# Patient Record
Sex: FEMALE | Race: WHITE | NOT HISPANIC OR LATINO | Employment: FULL TIME | URBAN - METROPOLITAN AREA
[De-identification: names, ages, dates, MRNs, and addresses within clinical notes are randomized per-mention and may not be internally consistent; named-entity substitution may affect disease eponyms.]

---

## 2017-02-01 ENCOUNTER — OFFICE VISIT (OUTPATIENT)
Dept: PSYCHIATRY | Facility: CLINIC | Age: 22
End: 2017-02-01
Payer: COMMERCIAL

## 2017-02-01 ENCOUNTER — HOSPITAL ENCOUNTER (OUTPATIENT)
Dept: CARDIOLOGY | Facility: CLINIC | Age: 22
Discharge: HOME | End: 2017-02-01
Payer: COMMERCIAL

## 2017-02-01 ENCOUNTER — LAB VISIT (OUTPATIENT)
Dept: LAB | Facility: HOSPITAL | Age: 22
End: 2017-02-01
Attending: PSYCHIATRY & NEUROLOGY
Payer: COMMERCIAL

## 2017-02-01 VITALS
BODY MASS INDEX: 34.91 KG/M2 | WEIGHT: 197 LBS | HEART RATE: 89 BPM | DIASTOLIC BLOOD PRESSURE: 83 MMHG | SYSTOLIC BLOOD PRESSURE: 131 MMHG | HEIGHT: 63 IN

## 2017-02-01 DIAGNOSIS — F90.2 ADHD (ATTENTION DEFICIT HYPERACTIVITY DISORDER), COMBINED TYPE: ICD-10-CM

## 2017-02-01 DIAGNOSIS — F90.2 ADHD (ATTENTION DEFICIT HYPERACTIVITY DISORDER), COMBINED TYPE: Primary | ICD-10-CM

## 2017-02-01 LAB
ALBUMIN SERPL BCP-MCNC: 3.6 G/DL
ALP SERPL-CCNC: 56 U/L
ALT SERPL W/O P-5'-P-CCNC: 15 U/L
ANION GAP SERPL CALC-SCNC: 8 MMOL/L
AST SERPL-CCNC: 15 U/L
BILIRUB SERPL-MCNC: 0.3 MG/DL
BUN SERPL-MCNC: 17 MG/DL
CALCIUM SERPL-MCNC: 9 MG/DL
CHLORIDE SERPL-SCNC: 104 MMOL/L
CO2 SERPL-SCNC: 27 MMOL/L
CREAT SERPL-MCNC: 1 MG/DL
EST. GFR  (AFRICAN AMERICAN): >60 ML/MIN/1.73 M^2
EST. GFR  (NON AFRICAN AMERICAN): >60 ML/MIN/1.73 M^2
GLUCOSE SERPL-MCNC: 82 MG/DL
POTASSIUM SERPL-SCNC: 4 MMOL/L
PROT SERPL-MCNC: 7.3 G/DL
SODIUM SERPL-SCNC: 139 MMOL/L

## 2017-02-01 PROCEDURE — 99213 OFFICE O/P EST LOW 20 MIN: CPT | Mod: S$GLB,,, | Performed by: PSYCHIATRY & NEUROLOGY

## 2017-02-01 PROCEDURE — 93000 ELECTROCARDIOGRAM COMPLETE: CPT | Mod: S$GLB,,, | Performed by: INTERNAL MEDICINE

## 2017-02-01 PROCEDURE — 99999 PR PBB SHADOW E&M-EST. PATIENT-LVL II: CPT | Mod: PBBFAC,,, | Performed by: PSYCHIATRY & NEUROLOGY

## 2017-02-01 PROCEDURE — 80053 COMPREHEN METABOLIC PANEL: CPT

## 2017-02-01 PROCEDURE — 36415 COLL VENOUS BLD VENIPUNCTURE: CPT

## 2017-02-01 RX ORDER — DEXTROAMPHETAMINE SACCHARATE, AMPHETAMINE ASPARTATE, DEXTROAMPHETAMINE SULFATE AND AMPHETAMINE SULFATE 5; 5; 5; 5 MG/1; MG/1; MG/1; MG/1
TABLET ORAL
Qty: 60 TABLET | Refills: 0 | Status: SHIPPED | OUTPATIENT
Start: 2017-02-20 | End: 2017-03-19

## 2017-02-01 RX ORDER — DEXTROAMPHETAMINE SACCHARATE, AMPHETAMINE ASPARTATE, DEXTROAMPHETAMINE SULFATE AND AMPHETAMINE SULFATE 5; 5; 5; 5 MG/1; MG/1; MG/1; MG/1
1 TABLET ORAL DAILY
Qty: 60 TABLET | Refills: 0 | Status: SHIPPED | OUTPATIENT
Start: 2017-03-20 | End: 2017-02-01 | Stop reason: SDUPTHER

## 2017-02-01 RX ORDER — DEXTROAMPHETAMINE SACCHARATE, AMPHETAMINE ASPARTATE, DEXTROAMPHETAMINE SULFATE AND AMPHETAMINE SULFATE 5; 5; 5; 5 MG/1; MG/1; MG/1; MG/1
TABLET ORAL
Qty: 60 TABLET | Refills: 0 | Status: SHIPPED | OUTPATIENT
Start: 2017-03-20 | End: 2017-04-12 | Stop reason: SDUPTHER

## 2017-02-01 NOTE — PROGRESS NOTES
"2/1/2017    Bernarda Ly   1995   [id number]       OUTPATIENT PSYCHIATRY FOLLOW UP NOTE      Clinical Status of Patient:  Outpatient (Ambulatory)    Chief Complaint:  Bernarda Ly is a 21 y.o. female who presents today for follow-up of attention problems.  Met with patient and I have reviewed the patient's medical history in detail and updated the computerized patient record.    Interval History and Content of Current Session:  Pt presents for f/u appointment today for ADHD. Pt 15 min late to appointment.  Pt is doing well.  Pt using the adderall to study for school.  She has only needed it to study and going to nursing classes.  Pt was able to get and A and 2 B's, which she is happy with.  Pt states she has been taking Adderall IR 20mg in the morning and either another 10-20mg in the afternoon depending on how much she needs to study.  Pt states she has a "drug holiday" 2 days a week.  She mentions that the she will have drowsiness when the medication wears off. She said her appetite is good and she is sleeping well at night. She denies any other past psychiatric problems. She denies AVH/SI/HI, plan or intent.       SUBJECTIVE     Psychiatric Review Of Systems - Is patient experiencing or having changes in:  sleep: no  appetite: no  weight: no  energy/anergy: no  interest/pleasure/anhedonia: no  somatic symptoms: no  libido: no  guilty/hopelessness: no  concentration: no  S.I.B.s/risky behavior: no  SI/SA:  no    anxiety/panic: no  Agoraphobia:  no  Social phobia:  no  Recurrent nightmares:  no  hyper startle response:  no  Avoidance: no  Recurrent thoughts:  no  Recurrent behaviors:  no    Irritability: no  Racing thoughts: no  Impulsive behaviors: no  Pressured speech:  no    Paranoia:no  Delusions: no  AVH:no    Screens and Inventories:  none    Substance abuse:  ETOH- denies  Drugs- denies  Tobacco= denies    Past Medical, Family and Social History: The patient's past medical, family and social history have " "been reviewed and updated as appropriate within the electronic medical record - see encounter notes.  Pt states she does not have any other medical issues. Pt denies ETOH, illicit drug use and tobacco use. Pt is  and will want to have children but not anytime soon. 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    Current Psychotropic medications:  Adderall IR 20mg PO qAM and 10-20mg in the afternoon    Compliance: no    Side effects: see above    Risk Parameters:  Patient reports no suicidal ideation  Patient reports no homicidal ideation  Patient reports no self-injurious behavior  Patient reports no violent behavior    Psychotherapy:  Supportive psychotherapy for 5 min    OBJECTIVE     Medical ROS  General ROS: negative for - chills, fatigue or fever  Respiratory ROS: no cough, shortness of breath, or wheezing  Cardiovascular ROS: no chest pain or dyspnea on exertion  Gastrointestinal ROS: no abdominal pain, change in bowel habits, or black or bloody stools  Musculoskeletal ROS: negative for - gait disturbance, joint stiffness, muscle pain or muscular weakness  Neurological ROS: negative for - confusion, dizziness, gait disturbance, headaches, impaired coordination/balance, seizures or visual changes    Nutritional Screening: Considering the patient's height and weight, medications, medical history and preferences, should a referral be made to the dietitian? no    Musculoskeletal  Muscle Strength/Tone:  not examined   Gait & Station:  non-ataxic     Relevant Elements of Neurological Exam: normal gait  AIMS:  n/a    Constitutional  Vitals:  Most recent vital signs, dated less than 90 days prior to this appointment, were reviewed.    Vitals:    02/01/17 1515   BP: 131/83   Pulse: 89   Weight: 89.4 kg (197 lb)   Height: 5' 3" (1.6 m)          Allergies  Review of patient's allergies indicates:   Allergen Reactions    Sulfa (sulfonamide antibiotics)        Laboratory Data  No visits with results within 1 " "Month(s) from this visit.  Latest known visit with results is:    Orders Only on 11/29/2010   Component Date Value Ref Range Status    Strep A Culture 11/29/2010 NEGATIVE FOR GROUP A STREP   Final    Monospot 12/02/2010 Negative  Negative Final    WBC 12/02/2010 6.64  4.5 - 13.0 K/uL Final    RBC 12/02/2010 4.49  4.1 - 5.1 M/uL Final    Hemoglobin 12/02/2010 13.4  12.0 - 16.0 gm/dl Final    Hematocrit 12/02/2010 38.6  36.0 - 46.0 % Final    MCV 12/02/2010 86.0  78 - 104 fL Final    MCH 12/02/2010 29.8  25 - 35 pg Final    MCHC 12/02/2010 34.7  31 - 36 % Final    RDW 12/02/2010 11.8  11.5 - 14.5 % Final    Platelets 12/02/2010 276  150 - 350 K/uL Final    MPV 12/02/2010 10.6  9.2 - 12.9 fL Final    Segs 12/02/2010 55  50 - 70 % Final    Lymphs 12/02/2010 32  25 - 40 % Final    Monocytes 12/02/2010 8  0 - 10 % Final    Eosinophils 12/02/2010 5  0 - 8 % Final    Platelet Estimate 12/02/2010 Adequate  Normal Final       All Medications    Current Outpatient Prescriptions:     dextroamphetamine-amphetamine (AMPHETAMINE SALT COMBO) 10 mg Tab, Take 20 mg by mouth 2 (two) times daily., Disp: 60 tablet, Rfl: 0    norethindrone-ethinyl estradiol (JUNEL FE 1/20) 1 mg-20 mcg (21)/75 mg (7) per tablet, Take 1 tablet by mouth once daily., Disp: 84 tablet, Rfl: 3    Psychiatric Mental Status Exam  Appearance: unremarkable, age appropriate, well groomed   Behavior/Cooperation: appopriate friendly and cooperative  Speech: appropriate rate, volume and tone  Mood: "doing well"  Affect:  full; reactive, bright  Thought Process: normal and logical  Thought Content: normal, no suicidality, no homicidality, delusions, or paranoia  Sensorium:    grossly intact  Alert and Oriented: x3  Memory: grossly intact    Attention/concentration: appropriate for age/education.  Similarities:  grossly intact  Abstract reasoning: grossly intact  Insight: Intact  Judgment: Intact    ASSESSMENT      Impression:      ICD-10-CM ICD-9-CM "   1. ADHD (attention deficit hyperactivity disorder), combined type F90.2 314.01       Status/Progress: Based on the examination today, the patient's problem(s) is/are improved.  New problems have not been presented today.   Co-morbidities are not complicating management of the primary condition.  There are no active rule-out diagnoses for this patient at this time.     TREATMENT PLAN     · Medication Management:   · Continue adderall IR 20mg in morning and 10-20mg in afternoon (#60 lasts 1.5 months), 2 prescriptions given today  · Ordered EKG and CMP  · The treatment plan and follow up plan were reviewed with the patient.  Side effects of stimulants include but are not limited to insomnia, headache, anxiety, psychosis, anorexia, hypertension, cardiovascular effects. These were discussed and understood by the patient. In addition, recommended patient use some sort of birth control if she is sexually active as the medication can adversely affect a fetus (Pt is on birth control pills). Patient expressed understanding of this information.  Patient denies any side effects from medication at this time.  Patient advised not to take afternoon dose after 4pm as it can interfere with her sleep if taken that late in the day. Pt was advised to take drug holiday.  · Discussed with patient informed consent, risks vs. benefits, alternative treatments, side effect profile and the inherent unpredictability of individual responses to these treatments. The patient expresses understanding of the above and displays the capacity to agree with this current plan.  · Encouraged Patient to keep future appointments.   · Take medications as prescribed and abstain from substance abuse.   · In the event of an emergency patient was advised to go to the emergency room.    Return to Clinic: 3 months    Counseling/ psychotherapy time: 5 minutes  Total time: 15 minutes    Karon Patricia MD Psychiatry HO-3

## 2017-04-12 RX ORDER — DEXTROAMPHETAMINE SACCHARATE, AMPHETAMINE ASPARTATE, DEXTROAMPHETAMINE SULFATE AND AMPHETAMINE SULFATE 5; 5; 5; 5 MG/1; MG/1; MG/1; MG/1
TABLET ORAL
Qty: 60 TABLET | Refills: 0 | Status: SHIPPED | OUTPATIENT
Start: 2017-04-12 | End: 2017-06-07 | Stop reason: SDUPTHER

## 2017-05-29 ENCOUNTER — PATIENT MESSAGE (OUTPATIENT)
Dept: OBSTETRICS AND GYNECOLOGY | Facility: CLINIC | Age: 22
End: 2017-05-29

## 2017-05-29 ENCOUNTER — TELEPHONE (OUTPATIENT)
Dept: OBSTETRICS AND GYNECOLOGY | Facility: CLINIC | Age: 22
End: 2017-05-29

## 2017-05-29 DIAGNOSIS — Z30.011 ENCOUNTER FOR INITIAL PRESCRIPTION OF CONTRACEPTIVE PILLS: ICD-10-CM

## 2017-05-29 RX ORDER — NORETHINDRONE ACETATE AND ETHINYL ESTRADIOL 1MG-20(21)
1 KIT ORAL DAILY
Qty: 28 TABLET | Refills: 0 | Status: SHIPPED | OUTPATIENT
Start: 2017-05-29 | End: 2017-06-01

## 2017-05-30 NOTE — TELEPHONE ENCOUNTER
----- Message from Sallie Walker sent at 5/30/2017 10:27 AM CDT -----  Contact: rx  RX called to refill  medication  blisovi 3 month supply  @ [facility]  Phone # [phone]      MY CHART MESSAGE:  I already sent one refill of your birth control prescription to your pharmacy and Ms Canseco, our , has been trying to call you to set up an appointment for an annual gyn exam so you can get a year's prescription.     norethindrone-ethinyl estradiol (JUNEL FE 1/20) 1 mg-20 mcg (21)/75 mg (7) per tablet 28 tablet 0 5/29/2017 5/29/2018 --  Sig - Route: Take 1 tablet by mouth once daily. - Oral  Class: Normal  Notes to Pharmacy: Needs doctor's appointment  Order: [id number]  Date/Time Signed: 5/29/2017 09:01      E-Prescribing Status: Receipt confirmed by pharmacy (5/29/2017  9:02 AM CDT)  Pharmacy       [id number] - [address]  Ada Escamilla NP

## 2017-05-30 NOTE — TELEPHONE ENCOUNTER
----- Message from Yael SALMERON Gil sent at 5/30/2017 12:24 PM CDT -----  Contact: pt   _  1st Request  X_  2nd Request  _  3rd Request        Who: MCKENNA KRAMER [[id number]]    Why: pt is calling in regards to an authorization  for her birthcontrol  What Number to Call Back: [phone]    When to Expect a call back: (Before the end of the day)   -- if the call is after 12:00, the call back will be tomorrow.

## 2017-06-01 ENCOUNTER — OFFICE VISIT (OUTPATIENT)
Dept: OBSTETRICS AND GYNECOLOGY | Facility: CLINIC | Age: 22
End: 2017-06-01
Payer: COMMERCIAL

## 2017-06-01 ENCOUNTER — HOSPITAL ENCOUNTER (OUTPATIENT)
Dept: RADIOLOGY | Facility: HOSPITAL | Age: 22
Discharge: HOME | End: 2017-06-01
Attending: NURSE PRACTITIONER
Payer: COMMERCIAL

## 2017-06-01 ENCOUNTER — TELEPHONE (OUTPATIENT)
Dept: OBSTETRICS AND GYNECOLOGY | Facility: CLINIC | Age: 22
End: 2017-06-01

## 2017-06-01 VITALS
DIASTOLIC BLOOD PRESSURE: 64 MMHG | BODY MASS INDEX: 33.8 KG/M2 | SYSTOLIC BLOOD PRESSURE: 118 MMHG | WEIGHT: 198 LBS | HEIGHT: 64 IN

## 2017-06-01 DIAGNOSIS — Z31.69 ENCOUNTER FOR PRECONCEPTION CONSULTATION: ICD-10-CM

## 2017-06-01 DIAGNOSIS — Z01.419 ENCOUNTER FOR GYNECOLOGICAL EXAMINATION: Primary | ICD-10-CM

## 2017-06-01 DIAGNOSIS — N63.20 MASSES OF BOTH BREASTS: ICD-10-CM

## 2017-06-01 DIAGNOSIS — Z30.41 ENCOUNTER FOR SURVEILLANCE OF CONTRACEPTIVE PILLS: ICD-10-CM

## 2017-06-01 DIAGNOSIS — N63.10 MASSES OF BOTH BREASTS: ICD-10-CM

## 2017-06-01 DIAGNOSIS — Z01.419 ENCOUNTER FOR GYNECOLOGICAL EXAMINATION: ICD-10-CM

## 2017-06-01 PROCEDURE — 76642 ULTRASOUND BREAST LIMITED: CPT | Mod: 26,50,, | Performed by: RADIOLOGY

## 2017-06-01 PROCEDURE — 76642 ULTRASOUND BREAST LIMITED: CPT | Mod: TC,50

## 2017-06-01 PROCEDURE — 99999 PR PBB SHADOW E&M-EST. PATIENT-LVL III: CPT | Mod: PBBFAC,,, | Performed by: NURSE PRACTITIONER

## 2017-06-01 PROCEDURE — 99395 PREV VISIT EST AGE 18-39: CPT | Mod: S$GLB,,, | Performed by: NURSE PRACTITIONER

## 2017-06-01 NOTE — PROGRESS NOTES
HISTORY OF PRESENT ILLNESS:    Bernarda Ly is a 22 y.o. female, , Patient's last menstrual period was 2017 (exact date).,  presents for a routine exam and OCP refills.  -Has found painful lumps in both breasts, Left > Right for the past several months.  -Also, may want to try to conceive next year.    Past Medical History:   Diagnosis Date    ADD (attention deficit disorder)     Obesity        Past Surgical History:   Procedure Laterality Date    APPENDECTOMY      2012    CYST REMOVAL      left ring finger       MEDICATIONS AND ALLERGIES:      Current Outpatient Prescriptions:     dextroamphetamine-amphetamine (ADDERALL) 20 mg tablet, Take one tablet twice a day, Disp: 60 tablet, Rfl: 0    norethindrone-e.estradiol-iron (BLISOVI 24 FE) 1 mg-20 mcg (24)/75 mg (4) Oral per tablet, Take 1 tablet by mouth once daily., Disp: 84 tablet, Rfl: 4    Review of patient's allergies indicates:   Allergen Reactions    Sulfa (sulfonamide antibiotics)        Family History   Problem Relation Age of Onset    Adopted: Yes    Colon cancer Neg Hx     Ovarian cancer Neg Hx     Breast cancer Neg Hx        Social History     Social History    Marital status:      Spouse name: N/A    Number of children: N/A    Years of education: N/A     Occupational History    Not on file.     Social History Main Topics    Smoking status: Never Smoker    Smokeless tobacco: Never Used    Alcohol use No    Drug use: No    Sexual activity: Yes     Partners: Male     Birth control/ protection: OCP     Other Topics Concern    Not on file     Social History Narrative           OB HISTORY: None.      COMPREHENSIVE GYN HISTORY:  PAP History: Denies abnormal Paps. LAST PAP 2-16 NORMAL.  Infection History: Denies STDs. Denies PID.  Benign History: Denies uterine fibroids. Denies ovarian cysts. Denies endometriosis. Denies other conditions.  Cancer History: Denies cervical cancer. Denies uterine cancer or  "hyperplasia. Denies ovarian cancer. Denies vulvar cancer or pre-cancer. Denies vaginal cancer or pre-cancer. Denies breast cancer. Denies colon cancer.  Sexual Activity History: Reports currently being sexually active  Menstrual History: Monthly. Light flow.   Dysmenorrhea History: Reports mild dysmenorrhea.   Contraception: OCPs.    ROS:  GENERAL: + WT GAIN. No swelling. No fatigue. No fever.  CARDIOVASCULAR: No chest pain. No shortness of breath. No leg cramps.   NEUROLOGICAL: No headaches. No vision changes.  BREASTS: + PAINFUL LUMPS. No discharge.  ABDOMEN: No pain. No nausea. No vomiting. No diarrhea. No constipation.  REPRODUCTIVE: No abnormal bleeding.   VULVA: No pain. No lesions. No itching.  VAGINA: No relaxation. No itching. No odor. No discharge. No lesions.  URINARY: No incontinence. No nocturia. No frequency. No dysuria.    /64   Ht 5' 3.5" (1.613 m)   Wt 89.8 kg (198 lb)   LMP 05/09/2017 (Exact Date)   BMI 34.52 kg/m²     PE:  APPEARANCE: Well nourished, well developed, in no acute distress.  AFFECT: WNL, alert and oriented x 3.  SKIN: No acne or hirsutism.  NECK: Neck symmetric, without masses or thyromegaly.  NODES: No inguinal, cervical, axillary or femoral lymph node enlargement.  CHEST: Good respiratory effort.   ABDOMEN: Soft. No tenderness or masses.   BREASTS: Symmetrical, no skin changes or nipple discharge bilaterally. RIGHT BREAST WITH SUPERFICIAL BURN "FROM COOKING". RIGHT BREAST WITH A SUPERFICIAL 0.5CM MOBILE SLIGHTLY TENDER MASS MOQ. LEFT BREAST WITH LARGER 1-2 CM SUPERFICIAL SLIGHTLY TENDER MASS UOQ.   PELVIC: External female genitalia without lesions.  Female hair distribution. Adequate perineal body, Normal urethral meatus. Vagina moist and well rugated without lesions or discharge.  No significant cystocele or rectocele present. Cervix pink without lesions, discharge or tenderness. Uterus is 4-6 week size, regular, mobile and nontender. Adnexa without masses or tenderness. " EXAM DIFFICULT DUE TO BODY HABITUS.  EXTREMITIES: No edema    DIAGNOSIS:  1. Encounter for gynecological examination    2. Encounter for surveillance of contraceptive pills    3. Masses of both breasts    4. Encounter for preconception consultation        PLAN:    Orders Placed This Encounter    US Breast Bilateral Complete    norethindrone-e.estradiol-iron (BLISOVI 24 FE) 1 mg-20 mcg (24)/75 mg (4) Oral per tablet   Declined STD screening    COUNSELING:  The patient was counseled today on:  -OCP use and potential side effects;  -to follow up in the breast center re: breast masses;  -procreation and future pregnancy, including optimal timing for becoming pregnant, use of prenatal vitamins that contain folate and iron, avoidance of alcohol and caffeine during early pregnancy, to review her immunization history and to update as necessary, to review her family history for potential inherited diseases that would require  screening;  -that she cannot take Adderall during pregnancy;  -A.C.S. Pap and pelvic exam guidelines (pap every 3 years), recomendations for yearly mammogram;  -to follow up with her PCP for other health maintenance.    FOLLOW-UP with me pending test results and annually.

## 2017-06-01 NOTE — TELEPHONE ENCOUNTER
----- Message from Indu Horowitz sent at 6/1/2017  3:20 PM CDT -----  Contact: self  Pt states she is at the pharmacy and they are saying they never received her BC Rx, she can be reached at  [phone].      Re-sent. Gh

## 2017-06-07 ENCOUNTER — OFFICE VISIT (OUTPATIENT)
Dept: PSYCHIATRY | Facility: CLINIC | Age: 22
End: 2017-06-07
Payer: COMMERCIAL

## 2017-06-07 VITALS
DIASTOLIC BLOOD PRESSURE: 75 MMHG | HEART RATE: 78 BPM | WEIGHT: 201.19 LBS | HEIGHT: 63 IN | BODY MASS INDEX: 35.65 KG/M2 | SYSTOLIC BLOOD PRESSURE: 129 MMHG

## 2017-06-07 DIAGNOSIS — F90.2 ADHD (ATTENTION DEFICIT HYPERACTIVITY DISORDER), COMBINED TYPE: Primary | ICD-10-CM

## 2017-06-07 PROCEDURE — 99999 PR PBB SHADOW E&M-EST. PATIENT-LVL II: CPT | Mod: PBBFAC,,, | Performed by: PSYCHIATRY & NEUROLOGY

## 2017-06-07 PROCEDURE — 99213 OFFICE O/P EST LOW 20 MIN: CPT | Mod: S$GLB,,, | Performed by: PSYCHIATRY & NEUROLOGY

## 2017-06-07 RX ORDER — DEXTROAMPHETAMINE SACCHARATE, AMPHETAMINE ASPARTATE, DEXTROAMPHETAMINE SULFATE AND AMPHETAMINE SULFATE 5; 5; 5; 5 MG/1; MG/1; MG/1; MG/1
1 TABLET ORAL 2 TIMES DAILY
Qty: 62 TABLET | Refills: 0 | Status: SHIPPED | OUTPATIENT
Start: 2017-08-07 | End: 2017-09-20 | Stop reason: SDUPTHER

## 2017-06-07 RX ORDER — DEXTROAMPHETAMINE SACCHARATE, AMPHETAMINE ASPARTATE, DEXTROAMPHETAMINE SULFATE AND AMPHETAMINE SULFATE 5; 5; 5; 5 MG/1; MG/1; MG/1; MG/1
TABLET ORAL
Qty: 60 TABLET | Refills: 0 | Status: SHIPPED | OUTPATIENT
Start: 2017-06-07 | End: 2017-07-07

## 2017-06-07 RX ORDER — DEXTROAMPHETAMINE SACCHARATE, AMPHETAMINE ASPARTATE, DEXTROAMPHETAMINE SULFATE AND AMPHETAMINE SULFATE 5; 5; 5; 5 MG/1; MG/1; MG/1; MG/1
1 TABLET ORAL 2 TIMES DAILY
Qty: 62 TABLET | Refills: 0 | Status: SHIPPED | OUTPATIENT
Start: 2017-07-07 | End: 2017-08-07

## 2017-06-07 NOTE — PROGRESS NOTES
6/7/2017    Bernarda Ly   1995   [id number]       OUTPATIENT PSYCHIATRY FOLLOW UP NOTE      Clinical Status of Patient:  Outpatient (Ambulatory)    Chief Complaint:  Benrarda Ly is a 22 y.o. female who presents today for follow-up of attention problems.  Met with patient and I have reviewed the patient's medical history in detail and updated the computerized patient record.    Interval History and Content of Current Session:  Pt presents for f/u appointment today for ADHD.  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   Pt using the adderall to study for school.  Pt states she has been taking Adderall IR 20mg in the morning and either another 20mg in the afternoon.  Pt states she has about 2 days a week where she does not take the adderall.  She mentions that the she will have drowsiness when the medication wears off. She said her appetite is good and she is sleeping well at night. She denies any other past psychiatric problems. She denies AVH/SI/HI, plan or intent. Plans to try to become pregnant in 2018.  Pt currently aware to not conceive while on stimulants and to stop the medication a week prior.       SUBJECTIVE     Psychiatric Review Of Systems - Is patient experiencing or having changes in:  sleep: no  appetite: no  weight: no  energy/anergy: no  interest/pleasure/anhedonia: no  somatic symptoms: no  libido: no  guilty/hopelessness: no  concentration: no  S.I.B.s/risky behavior: no  SI/SA:  no    anxiety/panic: no  Agoraphobia:  no  Social phobia:  no  Recurrent nightmares:  no  hyper startle response:  no  Avoidance: no  Recurrent thoughts:  no  Recurrent behaviors:  no    Irritability: no  Racing thoughts: no  Impulsive behaviors: no  Pressured speech:  no    Paranoia:no  Delusions: no  AVH:no    Screens and Inventories:  none    Substance abuse:  ETOH- denies  Drugs- denies  Tobacco= denies    Past Medical, Family and Social History: The patient's past medical, family and  "social history have been reviewed and updated as appropriate within the electronic medical record - see encounter notes.  Pt states she does not have any other medical issues. Pt denies ETOH, illicit drug use and tobacco use. Pt is  and will want to have children but not anytime soon. 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    Current Psychotropic medications:  Adderall IR 20mg PO qAM and 20mg in the afternoon    Compliance: no    Side effects: see above    Risk Parameters:  Patient reports no suicidal ideation  Patient reports no homicidal ideation  Patient reports no self-injurious behavior  Patient reports no violent behavior    Psychotherapy:  none    OBJECTIVE     Medical ROS  General ROS: negative for - chills, fatigue or fever  Respiratory ROS: no cough, shortness of breath, or wheezing  Cardiovascular ROS: no chest pain or dyspnea on exertion  Gastrointestinal ROS: no abdominal pain, change in bowel habits, or black or bloody stools  Musculoskeletal ROS: negative for - gait disturbance, joint stiffness, muscle pain or muscular weakness  Neurological ROS: negative for - confusion, dizziness, gait disturbance, headaches, impaired coordination/balance, seizures or visual changes    Nutritional Screening: Considering the patient's height and weight, medications, medical history and preferences, should a referral be made to the dietitian? no    Musculoskeletal  Muscle Strength/Tone:  not examined   Gait & Station:  non-ataxic     Relevant Elements of Neurological Exam: normal gait  AIMS:  n/a    Constitutional  Vitals:  Most recent vital signs, dated less than 90 days prior to this appointment, were reviewed.    Vitals:    06/07/17 0902   BP: 129/75   Pulse: 78   Weight: 91.3 kg (201 lb 3.2 oz)   Height: 5' 3" (1.6 m)          Allergies  Review of patient's allergies indicates:   Allergen Reactions    Sulfa (sulfonamide antibiotics)        Laboratory Data  No visits with results within 1 Month(s) " "from this visit.   Latest known visit with results is:   Lab Visit on 02/01/2017   Component Date Value Ref Range Status    Sodium 02/01/2017 139  136 - 145 mmol/L Final    Potassium 02/01/2017 4.0  3.5 - 5.1 mmol/L Final    Chloride 02/01/2017 104  95 - 110 mmol/L Final    CO2 02/01/2017 27  23 - 29 mmol/L Final    Glucose 02/01/2017 82  70 - 110 mg/dL Final    BUN, Bld 02/01/2017 17  6 - 20 mg/dL Final    Creatinine 02/01/2017 1.0  0.5 - 1.4 mg/dL Final    Calcium 02/01/2017 9.0  8.7 - 10.5 mg/dL Final    Total Protein 02/01/2017 7.3  6.0 - 8.4 g/dL Final    Albumin 02/01/2017 3.6  3.5 - 5.2 g/dL Final    Total Bilirubin 02/01/2017 0.3  0.1 - 1.0 mg/dL Final    Alkaline Phosphatase 02/01/2017 56  55 - 135 U/L Final    AST 02/01/2017 15  10 - 40 U/L Final    ALT 02/01/2017 15  10 - 44 U/L Final    Anion Gap 02/01/2017 8  8 - 16 mmol/L Final    eGFR if African American 02/01/2017 >60.0  >60 mL/min/1.73 m^2 Final    eGFR if non African American 02/01/2017 >60.0  >60 mL/min/1.73 m^2 Final       All Medications    Current Outpatient Prescriptions:     dextroamphetamine-amphetamine (AMPHETAMINE SALT COMBO) 10 mg Tab, Take 20 mg by mouth 2 (two) times daily., Disp: 60 tablet, Rfl: 0    norethindrone-ethinyl estradiol (JUNEL FE 1/20) 1 mg-20 mcg (21)/75 mg (7) per tablet, Take 1 tablet by mouth once daily., Disp: 84 tablet, Rfl: 3    Psychiatric Mental Status Exam  Appearance: unremarkable, age appropriate, well groomed   Behavior/Cooperation: appropriate friendly and cooperative  Speech: appropriate rate, volume and tone  Mood: "good"  Affect:  full; reactive, bright  Thought Process: normal and logical  Thought Content: normal, no suicidality, no homicidality, delusions, or paranoia  Sensorium:    grossly intact  Alert and Oriented: x3  Memory: grossly intact    Attention/concentration: appropriate for age/education.  Similarities:  grossly intact  Abstract reasoning: grossly intact  Insight: " Intact  Judgment: Intact    ASSESSMENT      Impression:      ICD-10-CM ICD-9-CM   1. ADHD (attention deficit hyperactivity disorder), combined type F90.2 314.01       Status/Progress: Based on the examination today, the patient's problem(s) is/are improved.  New problems have not been presented today.   Co-morbidities are not complicating management of the primary condition.  There are no active rule-out diagnoses for this patient at this time.     TREATMENT PLAN     · Medication Management:   · Continue adderall IR 20mg in morning and 20mg in afternoon (#60, given through Sept 7th), 3 prescriptions given today  · EKG and CMP from feb WNL  · The treatment plan and follow up plan were reviewed with the patient.  Side effects of stimulants include but are not limited to insomnia, headache, anxiety, psychosis, anorexia, hypertension, cardiovascular effects. These were discussed and understood by the patient. In addition, recommended patient use some sort of birth control if she is sexually active as the medication can adversely affect a fetus (Pt is on birth control pills). Patient expressed understanding of this information.  Patient denies any side effects from medication at this time.  Patient advised not to take afternoon dose after 4pm as it can interfere with her sleep if taken that late in the day. Pt was advised to take drug holiday.  · Discussed with patient informed consent, risks vs. benefits, alternative treatments, side effect profile and the inherent unpredictability of individual responses to these treatments. The patient expresses understanding of the above and displays the capacity to agree with this current plan.  · Encouraged Patient to keep future appointments.   · Take medications as prescribed and abstain from substance abuse.   · In the event of an emergency patient was advised to go to the emergency room.\  · Discussed resident transition. In future Pt may want to be referred back to  PCP    Return to Clinic: 3 months    Counseling/ psychotherapy time: 0 minutes  Total time:30 minutes    Karon Patricia MD Psychiatry -3

## 2017-09-20 RX ORDER — DEXTROAMPHETAMINE SACCHARATE, AMPHETAMINE ASPARTATE, DEXTROAMPHETAMINE SULFATE AND AMPHETAMINE SULFATE 5; 5; 5; 5 MG/1; MG/1; MG/1; MG/1
1 TABLET ORAL 2 TIMES DAILY
Qty: 62 TABLET | Refills: 0 | Status: CANCELLED | OUTPATIENT
Start: 2017-09-20 | End: 2017-10-21

## 2017-09-20 RX ORDER — DEXTROAMPHETAMINE SACCHARATE, AMPHETAMINE ASPARTATE, DEXTROAMPHETAMINE SULFATE AND AMPHETAMINE SULFATE 5; 5; 5; 5 MG/1; MG/1; MG/1; MG/1
1 TABLET ORAL 2 TIMES DAILY
Qty: 62 TABLET | Refills: 0 | Status: SHIPPED | OUTPATIENT
Start: 2017-09-20 | End: 2017-11-28 | Stop reason: SDUPTHER

## 2017-11-28 ENCOUNTER — OFFICE VISIT (OUTPATIENT)
Dept: PSYCHIATRY | Facility: CLINIC | Age: 22
End: 2017-11-28
Payer: COMMERCIAL

## 2017-11-28 VITALS
HEIGHT: 63 IN | SYSTOLIC BLOOD PRESSURE: 124 MMHG | DIASTOLIC BLOOD PRESSURE: 76 MMHG | BODY MASS INDEX: 31.89 KG/M2 | WEIGHT: 180 LBS | HEART RATE: 68 BPM

## 2017-11-28 DIAGNOSIS — F90.2 ADHD (ATTENTION DEFICIT HYPERACTIVITY DISORDER), COMBINED TYPE: Primary | ICD-10-CM

## 2017-11-28 PROCEDURE — 99213 OFFICE O/P EST LOW 20 MIN: CPT | Mod: S$GLB,,, | Performed by: STUDENT IN AN ORGANIZED HEALTH CARE EDUCATION/TRAINING PROGRAM

## 2017-11-28 PROCEDURE — 99999 PR PBB SHADOW E&M-EST. PATIENT-LVL II: CPT | Mod: PBBFAC,,, | Performed by: STUDENT IN AN ORGANIZED HEALTH CARE EDUCATION/TRAINING PROGRAM

## 2017-11-28 RX ORDER — DEXTROAMPHETAMINE SACCHARATE, AMPHETAMINE ASPARTATE, DEXTROAMPHETAMINE SULFATE AND AMPHETAMINE SULFATE 5; 5; 5; 5 MG/1; MG/1; MG/1; MG/1
1 TABLET ORAL 2 TIMES DAILY
Qty: 60 TABLET | Refills: 0 | Status: SHIPPED | OUTPATIENT
Start: 2017-11-28 | End: 2017-12-26

## 2017-11-28 RX ORDER — DEXTROAMPHETAMINE SACCHARATE, AMPHETAMINE ASPARTATE MONOHYDRATE, DEXTROAMPHETAMINE SULFATE AND AMPHETAMINE SULFATE 7.5; 7.5; 7.5; 7.5 MG/1; MG/1; MG/1; MG/1
30 CAPSULE, EXTENDED RELEASE ORAL EVERY MORNING
Qty: 30 CAPSULE | Refills: 0 | Status: SHIPPED | OUTPATIENT
Start: 2017-11-28 | End: 2017-12-26 | Stop reason: SDUPTHER

## 2017-11-28 NOTE — PROGRESS NOTES
11/28/2017    Bernarda Ly   1995   [id number]       OUTPATIENT PSYCHIATRY FOLLOW UP NOTE      Clinical Status of Patient:  Outpatient (Ambulatory)    Chief Complaint:  Bernarda Ly is a 22 y.o. female who presents today for follow-up of attention problems.  Met with patient and I have reviewed the patient's medical history in detail and updated the computerized patient record.    Interval History and Content of Current Session:  Pt presents for f/u appointment today for ADHD.  Previously followed by Dr. Patricia, now assuming care.  Pt pleasant and friendly.  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  Has had to adjust the times she takes her adderall for night shift.  Gets up at 4:30pm, takes her adderall around 6:20pm or 6:30 pm and starts her shift at 7pm.  Has been taking 1.5 tabs (30mg) at 6:20 due to fear that she will take it too late in the night and cause insomnia.  Previously, when she was studying & on day schedule, she was taking adderall 20mg in the morning then again at 2 or 3pm and had insomnia.  With her current schedule her attention is pretty good (possibly a little high on dose) at 7pm, but wears off between 2-4am.  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  Does not want to take 1 pill at 7pm and the other half 4 hours later b/c she doesn't want to bring pills in and worry about taking again.  Has never tried long acting.  Discussed pros-cons of each and pt would like to try long acting.  No SI/HI/AVH.        SUBJECTIVE     Psychiatric Review Of Systems - Is patient experiencing or having changes in:  sleep: no  appetite: no  weight: no  energy/anergy: no  interest/pleasure/anhedonia: no  somatic symptoms: no  libido: no  guilty/hopelessness: no  concentration: no  S.I.B.s/risky behavior: no  SI/SA:  no    anxiety/panic: no  Agoraphobia:  no  Social  phobia:  no  Recurrent nightmares:  no  hyper startle response:  no  Avoidance: no  Recurrent thoughts:  no  Recurrent behaviors:  no    Irritability: no  Racing thoughts: no  Impulsive behaviors: no  Pressured speech:  no    Paranoia:no  Delusions: no  AVH:no    Screens and Inventories:  none    Substance abuse:  ETOH- denies  Drugs- denies  Tobacco= denies    Past Medical, Family and Social History: The patient's past medical, family and social history have been reviewed and updated as appropriate within the electronic medical record - see encounter notes.  Pt states she does not have any other medical issues. Pt denies ETOH, illicit drug use and tobacco use. Pt is  and will want to have children but not anytime soon. 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    Current Psychotropic medications:  Adderall IR 30mg at 6:30pm    Compliance: no    Side effects: see above    Risk Parameters:  Patient reports no suicidal ideation  Patient reports no homicidal ideation  Patient reports no self-injurious behavior  Patient reports no violent behavior    Psychotherapy:  none    OBJECTIVE     Medical ROS  General ROS: negative for - chills, fatigue or fever  Respiratory ROS: no cough, shortness of breath, or wheezing  Cardiovascular ROS: no chest pain or dyspnea on exertion  Gastrointestinal ROS: no abdominal pain, change in bowel habits, or black or bloody stools  Musculoskeletal ROS: negative for - gait disturbance, joint stiffness, muscle pain or muscular weakness  Neurological ROS: negative for - confusion, dizziness, gait disturbance, headaches, impaired coordination/balance, seizures or visual changes    Nutritional Screening: Considering the patient's height and weight, medications, medical history and preferences, should a referral be made to the dietitian? no    Musculoskeletal  Muscle Strength/Tone:  not examined   Gait & Station:  non-ataxic     Relevant Elements of Neurological Exam: normal  "gait  AIMS:  n/a    Constitutional  Vitals:  Most recent vital signs, dated less than 90 days prior to this appointment, were reviewed.    Vitals:    11/28/17 1057   BP: 124/76   Pulse: 68   Weight: 81.6 kg (180 lb)   Height: 5' 3" (1.6 m)          Allergies  Review of patient's allergies indicates:   Allergen Reactions    Sulfa (sulfonamide antibiotics)        Laboratory Data  No visits with results within 1 Month(s) from this visit.   Latest known visit with results is:   Lab Visit on 02/01/2017   Component Date Value Ref Range Status    Sodium 02/01/2017 139  136 - 145 mmol/L Final    Potassium 02/01/2017 4.0  3.5 - 5.1 mmol/L Final    Chloride 02/01/2017 104  95 - 110 mmol/L Final    CO2 02/01/2017 27  23 - 29 mmol/L Final    Glucose 02/01/2017 82  70 - 110 mg/dL Final    BUN, Bld 02/01/2017 17  6 - 20 mg/dL Final    Creatinine 02/01/2017 1.0  0.5 - 1.4 mg/dL Final    Calcium 02/01/2017 9.0  8.7 - 10.5 mg/dL Final    Total Protein 02/01/2017 7.3  6.0 - 8.4 g/dL Final    Albumin 02/01/2017 3.6  3.5 - 5.2 g/dL Final    Total Bilirubin 02/01/2017 0.3  0.1 - 1.0 mg/dL Final    Alkaline Phosphatase 02/01/2017 56  55 - 135 U/L Final    AST 02/01/2017 15  10 - 40 U/L Final    ALT 02/01/2017 15  10 - 44 U/L Final    Anion Gap 02/01/2017 8  8 - 16 mmol/L Final    eGFR if African American 02/01/2017 >60.0  >60 mL/min/1.73 m^2 Final    eGFR if non African American 02/01/2017 >60.0  >60 mL/min/1.73 m^2 Final       All Medications    Current Outpatient Prescriptions:     dextroamphetamine-amphetamine (AMPHETAMINE SALT COMBO) 10 mg Tab, Take 20 mg by mouth 2 (two) times daily., Disp: 60 tablet, Rfl: 0    norethindrone-ethinyl estradiol (JUNEL FE 1/20) 1 mg-20 mcg (21)/75 mg (7) per tablet, Take 1 tablet by mouth once daily., Disp: 84 tablet, Rfl: 3    Psychiatric Mental Status Exam  Appearance: unremarkable, age appropriate, well groomed   Behavior/Cooperation: appropriate friendly and cooperative  Speech: " "appropriate rate, volume and tone  Mood: "good"  Affect:  full; reactive, bright  Thought Process: normal and logical  Thought Content: normal, no suicidality, no homicidality, delusions, or paranoia  Sensorium:    grossly intact  Alert and Oriented: x3  Memory: grossly intact    Attention/concentration: appropriate for age/education.  Similarities:  grossly intact  Abstract reasoning: grossly intact  Insight: Intact  Judgment: Intact    ASSESSMENT      Impression:      ICD-10-CM ICD-9-CM   1. ADHD (attention deficit hyperactivity disorder), combined type F90.2 314.01       Status/Progress: Based on the examination today, the patient's problem(s) is/are adequately but not ideally controlled.  New problems have not been presented today.   Co-morbidities are not complicating management of the primary condition.  There are no active rule-out diagnoses for this patient at this time.     TREATMENT PLAN     Medications:  - Switch to Adderall XR 30mg daily (will take at 6pm)      - EKG and CMP from feb WNL      - Side effects of stimulants include but are not limited to insomnia, headache, anxiety, psychosis, anorexia, hypertension, cardiovascular effects. These were discussed and understood by the patient. In addition, recommended patient use some sort of birth control if she is sexually active as the medication can adversely affect a fetus (Pt is on birth control pills). Patient expressed understanding of this information.  Patient denies any side effects from medication at this time.  Patient advised not to take afternoon dose after 4pm as it can interfere with her sleep if taken that late in the day. Pt was advised to take drug holiday.  - Discussed with patient informed consent, risks vs. benefits, alternative treatments, side effect profile and the inherent unpredictability of individual responses to these treatments. The patient expresses understanding of the above and displays the capacity to agree with this " current plan.  - Encouraged Patient to keep future appointments.   - Take medications as prescribed and abstain from substance abuse.   - In the event of an emergency patient was advised to go to the emergency room.    Return to Clinic: 1 month      Total time:30 minutes        Eva Mobley MD  Psychiatry PGY-3  [phone]

## 2017-12-26 ENCOUNTER — OFFICE VISIT (OUTPATIENT)
Dept: PSYCHIATRY | Facility: CLINIC | Age: 22
End: 2017-12-26
Payer: COMMERCIAL

## 2017-12-26 VITALS
HEART RATE: 67 BPM | BODY MASS INDEX: 32.96 KG/M2 | SYSTOLIC BLOOD PRESSURE: 121 MMHG | HEIGHT: 63 IN | WEIGHT: 186 LBS | DIASTOLIC BLOOD PRESSURE: 69 MMHG

## 2017-12-26 DIAGNOSIS — F90.2 ADHD (ATTENTION DEFICIT HYPERACTIVITY DISORDER), COMBINED TYPE: Primary | ICD-10-CM

## 2017-12-26 PROCEDURE — 99213 OFFICE O/P EST LOW 20 MIN: CPT | Mod: S$GLB,,, | Performed by: STUDENT IN AN ORGANIZED HEALTH CARE EDUCATION/TRAINING PROGRAM

## 2017-12-26 PROCEDURE — 99999 PR PBB SHADOW E&M-EST. PATIENT-LVL II: CPT | Mod: PBBFAC,,, | Performed by: STUDENT IN AN ORGANIZED HEALTH CARE EDUCATION/TRAINING PROGRAM

## 2017-12-26 RX ORDER — DEXTROAMPHETAMINE SACCHARATE, AMPHETAMINE ASPARTATE MONOHYDRATE, DEXTROAMPHETAMINE SULFATE AND AMPHETAMINE SULFATE 7.5; 7.5; 7.5; 7.5 MG/1; MG/1; MG/1; MG/1
30 CAPSULE, EXTENDED RELEASE ORAL EVERY MORNING
Qty: 30 CAPSULE | Refills: 0 | Status: SHIPPED | OUTPATIENT
Start: 2018-01-22 | End: 2017-12-26 | Stop reason: SDUPTHER

## 2017-12-26 RX ORDER — DEXTROAMPHETAMINE SACCHARATE, AMPHETAMINE ASPARTATE MONOHYDRATE, DEXTROAMPHETAMINE SULFATE AND AMPHETAMINE SULFATE 7.5; 7.5; 7.5; 7.5 MG/1; MG/1; MG/1; MG/1
30 CAPSULE, EXTENDED RELEASE ORAL EVERY MORNING
Qty: 30 CAPSULE | Refills: 0 | Status: SHIPPED | OUTPATIENT
Start: 2017-12-26 | End: 2017-12-26 | Stop reason: SDUPTHER

## 2017-12-26 RX ORDER — DEXTROAMPHETAMINE SACCHARATE, AMPHETAMINE ASPARTATE MONOHYDRATE, DEXTROAMPHETAMINE SULFATE AND AMPHETAMINE SULFATE 7.5; 7.5; 7.5; 7.5 MG/1; MG/1; MG/1; MG/1
30 CAPSULE, EXTENDED RELEASE ORAL EVERY MORNING
Qty: 30 CAPSULE | Refills: 0 | Status: SHIPPED | OUTPATIENT
Start: 2018-02-18 | End: 2018-03-21 | Stop reason: SDUPTHER

## 2017-12-26 NOTE — PROGRESS NOTES
12/26/2017    Bernarda Ly   1995   [id number]       OUTPATIENT PSYCHIATRY FOLLOW UP NOTE      Clinical Status of Patient:  Outpatient (Ambulatory)    Chief Complaint:  Bernarda Ly is a 22 y.o. female who presents today for follow-up of attention problems.  Met with patient and I have reviewed the patient's medical history in detail and updated the computerized patient record.    Current Psychotropic medications:  Adderall IR 30mg at 6:30pm    Interval History and Content of Current Session:  Pt presents for f/u appointment today for ADHD.  At our last appt, Ms Ly was switched from adderall IR 20mg BID (was only actually taking 30mg/day) to adderall XR 30mg daily.  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    Today, pt reported she's only been on adderall XR for ~1 week b/c she went through a prior authorization process.  During that time, she had a backup IR prescription, but was worried she might affect her ability to fill the XR of she got the IR.  Therefore, she was w/o medication for a few weeks.  This made work more difficult.  Now she's on the Adderall XR and feels no difference from taking the IR as she would have in separate doses.  Has been working days during the holiday, so she's on a different schedule, but will be back to working nights after the holidays.  Prior auth covered for 36 months.  Still taking medication holidays.  No trouble with appetite or sleeping.   No SI/HI/AVH.     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  Has had to adjust the times she takes her adderall for night shift.  Gets up at 4:30pm, takes her adderall around 6:20pm or 6:30 pm and starts her shift at 7pm.  Has been taking 1.5 tabs (30mg) at 6:20 due to fear that she will take it too late  in the night and cause insomnia.  Previously, when she was studying & on day schedule, she was taking adderall 20mg in the morning then again at 2 or 3pm and had insomnia.  With her current schedule her attention is pretty good (possibly a little high on dose) at 7pm, but wears off between 2-4am.  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  Does not want to take 1 pill at 7pm and the other half 4 hours later b/c she doesn't want to bring pills in and worry about taking again.  Has never tried long acting.  Discussed pros-cons of each and pt would like to try long acting.          SUBJECTIVE     Psychiatric Review Of Systems - Is patient experiencing or having changes in:  sleep: no  appetite: no  weight: no  energy/anergy: no  interest/pleasure/anhedonia: no  guilty/hopelessness: no  concentration: no  anxiety/panic: no  Irritability: no  Racing thoughts: no  Impulsive behaviors: no  Paranoia:no  Delusions: no  SI/SA:  no  AVH:no    Substance abuse:  ETOH- denies  Drugs- denies  Tobacco= denies    Past Medical, Family and Social History: The patient's past medical, family and social history have been reviewed and updated as appropriate within the electronic medical record - see encounter notes.  Pt states she does not have any other medical issues. Pt denies ETOH, illicit drug use and tobacco use. Pt is  and will want to have children but not anytime soon. 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      Compliance: no    Side effects: see above    Risk Parameters:  Patient reports no suicidal ideation  Patient reports no homicidal ideation  Patient reports no self-injurious behavior  Patient reports no violent behavior    Psychotherapy:  none    OBJECTIVE     Medical ROS  General ROS: negative for - chills, fatigue or fever  Respiratory ROS: no cough, shortness of breath, or wheezing  Cardiovascular ROS: no chest pain or dyspnea on  "exertion  Gastrointestinal ROS: no abdominal pain, change in bowel habits, or black or bloody stools  Musculoskeletal ROS: negative for - gait disturbance, joint stiffness, muscle pain or muscular weakness  Neurological ROS: negative for - confusion, dizziness, gait disturbance, headaches, impaired coordination/balance, seizures or visual changes    Nutritional Screening: Considering the patient's height and weight, medications, medical history and preferences, should a referral be made to the dietitian? no    Musculoskeletal  Muscle Strength/Tone:  not examined   Gait & Station:  non-ataxic     Relevant Elements of Neurological Exam: normal gait  AIMS:  n/a    Constitutional  Vitals:  Most recent vital signs, dated less than 90 days prior to this appointment, were reviewed.    Vitals:    12/26/17 0802   BP: 121/69   Pulse: 67   Weight: 84.4 kg (186 lb)   Height: 5' 3" (1.6 m)          Allergies  Review of patient's allergies indicates:   Allergen Reactions    Sulfa (sulfonamide antibiotics)        Laboratory Data  No visits with results within 1 Month(s) from this visit.   Latest known visit with results is:   Lab Visit on 02/01/2017   Component Date Value Ref Range Status    Sodium 02/01/2017 139  136 - 145 mmol/L Final    Potassium 02/01/2017 4.0  3.5 - 5.1 mmol/L Final    Chloride 02/01/2017 104  95 - 110 mmol/L Final    CO2 02/01/2017 27  23 - 29 mmol/L Final    Glucose 02/01/2017 82  70 - 110 mg/dL Final    BUN, Bld 02/01/2017 17  6 - 20 mg/dL Final    Creatinine 02/01/2017 1.0  0.5 - 1.4 mg/dL Final    Calcium 02/01/2017 9.0  8.7 - 10.5 mg/dL Final    Total Protein 02/01/2017 7.3  6.0 - 8.4 g/dL Final    Albumin 02/01/2017 3.6  3.5 - 5.2 g/dL Final    Total Bilirubin 02/01/2017 0.3  0.1 - 1.0 mg/dL Final    Alkaline Phosphatase 02/01/2017 56  55 - 135 U/L Final    AST 02/01/2017 15  10 - 40 U/L Final    ALT 02/01/2017 15  10 - 44 U/L Final    Anion Gap 02/01/2017 8  8 - 16 mmol/L Final    " "eGFR if African American 02/01/2017 >60.0  >60 mL/min/1.73 m^2 Final    eGFR if non African American 02/01/2017 >60.0  >60 mL/min/1.73 m^2 Final       All Medications    Current Outpatient Prescriptions:     dextroamphetamine-amphetamine (AMPHETAMINE SALT COMBO) 10 mg Tab, Take 20 mg by mouth 2 (two) times daily., Disp: 60 tablet, Rfl: 0    norethindrone-ethinyl estradiol (JUNEL FE 1/20) 1 mg-20 mcg (21)/75 mg (7) per tablet, Take 1 tablet by mouth once daily., Disp: 84 tablet, Rfl: 3    Psychiatric Mental Status Exam  Appearance: unremarkable, age appropriate, well groomed   Behavior/Cooperation: appropriate friendly and cooperative  Speech: appropriate rate, volume and tone  Mood: "good"  Affect:  full; reactive, bright  Thought Process: normal and logical  Thought Content: normal, no suicidality, no homicidality, delusions, or paranoia  Sensorium:    grossly intact  Alert and Oriented: x3  Memory: grossly intact    Attention/concentration: appropriate for age/education.  Similarities:  grossly intact  Abstract reasoning: grossly intact  Insight: Intact  Judgment: Intact    ASSESSMENT      Impression:      ICD-10-CM ICD-9-CM   1. ADHD (attention deficit hyperactivity disorder), combined type F90.2 314.01       Status/Progress: Based on the examination today, the patient's problem(s) is/are adequately but not ideally controlled.  New problems have not been presented today.   Co-morbidities are not complicating management of the primary condition.  There are no active rule-out diagnoses for this patient at this time.     TREATMENT PLAN     Medications:  - Continue Adderall XR 30mg daily (will take at 6pm when working nights)      - EKG and CMP from feb WNL      - Side effects of stimulants include but are not limited to insomnia, headache, anxiety, psychosis, anorexia, hypertension, cardiovascular effects. These were discussed and understood by the patient. In addition, recommended patient use some sort of birth " control if she is sexually active as the medication can adversely affect a fetus (Pt is on birth control pills). Patient expressed understanding of this information.  Patient denies any side effects from medication at this time.  Patient advised not to take afternoon dose after 4pm as it can interfere with her sleep if taken that late in the day. Pt was advised to take drug holiday.      Risks, benefits, side effects and alternative treatments discussed with patient. Patient agrees with the current plan as documented.  Encouraged Patient to keep future appointments.  Take medications as prescribed and abstain from substance abuse.  Pt to present to ED for thoughts to harm herself or others      Return to Clinic:  3 months      Total time:20 minutes        Eva Mobley MD  Psychiatry PGY-3  [phone]

## 2018-03-21 ENCOUNTER — OFFICE VISIT (OUTPATIENT)
Dept: PSYCHIATRY | Facility: CLINIC | Age: 23
End: 2018-03-21
Payer: COMMERCIAL

## 2018-03-21 VITALS
HEIGHT: 63 IN | BODY MASS INDEX: 32.09 KG/M2 | DIASTOLIC BLOOD PRESSURE: 74 MMHG | HEART RATE: 102 BPM | WEIGHT: 181.13 LBS | SYSTOLIC BLOOD PRESSURE: 124 MMHG

## 2018-03-21 DIAGNOSIS — F90.2 ADHD (ATTENTION DEFICIT HYPERACTIVITY DISORDER), COMBINED TYPE: Primary | ICD-10-CM

## 2018-03-21 PROCEDURE — 99999 PR PBB SHADOW E&M-EST. PATIENT-LVL III: CPT | Mod: PBBFAC,,, | Performed by: STUDENT IN AN ORGANIZED HEALTH CARE EDUCATION/TRAINING PROGRAM

## 2018-03-21 PROCEDURE — 99213 OFFICE O/P EST LOW 20 MIN: CPT | Mod: S$GLB,,, | Performed by: STUDENT IN AN ORGANIZED HEALTH CARE EDUCATION/TRAINING PROGRAM

## 2018-03-21 RX ORDER — DEXTROAMPHETAMINE SACCHARATE, AMPHETAMINE ASPARTATE MONOHYDRATE, DEXTROAMPHETAMINE SULFATE AND AMPHETAMINE SULFATE 7.5; 7.5; 7.5; 7.5 MG/1; MG/1; MG/1; MG/1
30 CAPSULE, EXTENDED RELEASE ORAL EVERY MORNING
Qty: 30 CAPSULE | Refills: 0 | Status: SHIPPED | OUTPATIENT
Start: 2018-05-14 | End: 2018-12-13 | Stop reason: SDUPTHER

## 2018-03-21 RX ORDER — DEXTROAMPHETAMINE SACCHARATE, AMPHETAMINE ASPARTATE, DEXTROAMPHETAMINE SULFATE AND AMPHETAMINE SULFATE 2.5; 2.5; 2.5; 2.5 MG/1; MG/1; MG/1; MG/1
10 TABLET ORAL DAILY PRN
Qty: 15 TABLET | Refills: 0 | Status: SHIPPED | OUTPATIENT
Start: 2018-03-21 | End: 2018-03-21 | Stop reason: SDUPTHER

## 2018-03-21 RX ORDER — DEXTROAMPHETAMINE SACCHARATE, AMPHETAMINE ASPARTATE, DEXTROAMPHETAMINE SULFATE AND AMPHETAMINE SULFATE 2.5; 2.5; 2.5; 2.5 MG/1; MG/1; MG/1; MG/1
10 TABLET ORAL DAILY PRN
Qty: 15 TABLET | Refills: 0 | Status: SHIPPED | OUTPATIENT
Start: 2018-04-17 | End: 2018-03-21 | Stop reason: SDUPTHER

## 2018-03-21 RX ORDER — DEXTROAMPHETAMINE SACCHARATE, AMPHETAMINE ASPARTATE, DEXTROAMPHETAMINE SULFATE AND AMPHETAMINE SULFATE 2.5; 2.5; 2.5; 2.5 MG/1; MG/1; MG/1; MG/1
10 TABLET ORAL DAILY PRN
Qty: 15 TABLET | Refills: 0 | Status: SHIPPED | OUTPATIENT
Start: 2018-05-14 | End: 2018-06-13

## 2018-03-21 RX ORDER — DEXTROAMPHETAMINE SACCHARATE, AMPHETAMINE ASPARTATE MONOHYDRATE, DEXTROAMPHETAMINE SULFATE AND AMPHETAMINE SULFATE 7.5; 7.5; 7.5; 7.5 MG/1; MG/1; MG/1; MG/1
30 CAPSULE, EXTENDED RELEASE ORAL EVERY MORNING
Qty: 30 CAPSULE | Refills: 0 | Status: SHIPPED | OUTPATIENT
Start: 2018-04-17 | End: 2018-03-21 | Stop reason: SDUPTHER

## 2018-03-21 RX ORDER — DEXTROAMPHETAMINE SACCHARATE, AMPHETAMINE ASPARTATE MONOHYDRATE, DEXTROAMPHETAMINE SULFATE AND AMPHETAMINE SULFATE 7.5; 7.5; 7.5; 7.5 MG/1; MG/1; MG/1; MG/1
30 CAPSULE, EXTENDED RELEASE ORAL EVERY MORNING
Qty: 30 CAPSULE | Refills: 0 | Status: SHIPPED | OUTPATIENT
Start: 2018-03-21 | End: 2018-03-21 | Stop reason: SDUPTHER

## 2018-03-21 NOTE — PROGRESS NOTES
3/21/2018    Bernarda Ly   1995   [id number]       OUTPATIENT PSYCHIATRY FOLLOW UP NOTE      Clinical Status of Patient:  Outpatient (Ambulatory)    Chief Complaint:  Bernarda Ly is a 22 y.o. female who presents today for follow-up of attention problems.  Met with patient and I have reviewed the patient's medical history in detail and updated the computerized patient record.    Current Psychotropic medications:  Adderall IR 30mg at 6:30pm    Interval History and Content of Current Session:  Pt presents for f/u appointment today for ADHD.  Last Fall, Ms Ly was switched from adderall IR 20mg BID (was only actually taking 30mg/day) to adderall XR 30mg daily.  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  She works overnight shift, so tends to take first dose in the evening.  She noted much improvement switching to XR.    TODAY, just finished 4th 12 hr shift in a row.  Likes new extended release, going well, doesn't have to take extra med mid-way though shift.  Can't tell if wearing off which she noted is an improvement over the IR b/c the wear off is more gradual.  Some days if works shorter shift would prefer shorter acting med to take less total.  This happens a few times/mo.  Discussed option to add Adderall IR 10mg, she can take 10 or 15mg if needed during the short days.  Prior auth covered for 36 months, approved in ~Dec 2017.  Still taking medication holidays.  No trouble with appetite or sleeping.   No SI/HI/AVH.     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  Has had to adjust the times she takes her adderall for night shift.  Gets up at 4:30pm, takes her adderall around 6:20pm or 6:30 pm and starts her shift at 7pm.  Has been taking 1.5 tabs (30mg) at 6:20 due to fear that  she will take it too late in the night and cause insomnia.  Previously, when she was studying & on day schedule, she was taking adderall 20mg in the morning then again at 2 or 3pm and had insomnia.  With her current schedule her attention is pretty good (possibly a little high on dose) at 7pm, but wears off between 2-4am.  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  Does not want to take 1 pill at 7pm and the other half 4 hours later b/c she doesn't want to bring pills in and worry about taking again.  Has never tried long acting.  Discussed pros-cons of each and pt would like to try long acting.          SUBJECTIVE     Psychiatric Review Of Systems - Is patient experiencing or having changes in:  sleep: no  Depression: no  appetite: no, not hungry when on, more hungry off  weight: no  energy/anergy: no  interest/pleasure/anhedonia: no  guilty/hopelessness: no  concentration: no, good  anxiety/panic: no  Irritability: yes, coming off adderall  Racing thoughts: no  Impulsive behaviors: no  Paranoia:no  SI/SA:  no  AVH:no    Substance abuse:  ETOH- denies  Drugs- denies  Tobacco= denies    Past Medical, Family and Social History: The patient's past medical, family and social history have been reviewed and updated as appropriate within the electronic medical record - see encounter notes.  Pt states she does not have any other medical issues. Pt denies ETOH, illicit drug use and tobacco use. Pt is  and will want to have children but not anytime soon. 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      Compliance: no    Side effects: see above    Risk Parameters:  Patient reports no suicidal ideation  Patient reports no homicidal ideation  Patient reports no self-injurious behavior  Patient reports no violent behavior    Psychotherapy:  none    OBJECTIVE     Medical ROS  General ROS: negative for - chills, fatigue or fever  Respiratory ROS: no  "cough, shortness of breath, or wheezing  Cardiovascular ROS: no chest pain or dyspnea on exertion  Gastrointestinal ROS: no abdominal pain, change in bowel habits, or black or bloody stools  Musculoskeletal ROS: negative for - gait disturbance, joint stiffness, muscle pain or muscular weakness  Neurological ROS: negative for - confusion, dizziness, gait disturbance, headaches, impaired coordination/balance, seizures or visual changes    Nutritional Screening: Considering the patient's height and weight, medications, medical history and preferences, should a referral be made to the dietitian? no    Musculoskeletal  Muscle Strength/Tone:  not examined   Gait & Station:  non-ataxic     Relevant Elements of Neurological Exam: normal gait  AIMS:  n/a    Constitutional  Vitals:  Most recent vital signs, dated less than 90 days prior to this appointment, were reviewed.    Vitals:    03/21/18 0850   BP: 124/74   Pulse: 102   Weight: 82.2 kg (181 lb 1.7 oz)   Height: 5' 3" (1.6 m)          Allergies  Review of patient's allergies indicates:   Allergen Reactions    Sulfa (sulfonamide antibiotics)        Laboratory Data  No visits with results within 1 Month(s) from this visit.   Latest known visit with results is:   Lab Visit on 02/01/2017   Component Date Value Ref Range Status    Sodium 02/01/2017 139  136 - 145 mmol/L Final    Potassium 02/01/2017 4.0  3.5 - 5.1 mmol/L Final    Chloride 02/01/2017 104  95 - 110 mmol/L Final    CO2 02/01/2017 27  23 - 29 mmol/L Final    Glucose 02/01/2017 82  70 - 110 mg/dL Final    BUN, Bld 02/01/2017 17  6 - 20 mg/dL Final    Creatinine 02/01/2017 1.0  0.5 - 1.4 mg/dL Final    Calcium 02/01/2017 9.0  8.7 - 10.5 mg/dL Final    Total Protein 02/01/2017 7.3  6.0 - 8.4 g/dL Final    Albumin 02/01/2017 3.6  3.5 - 5.2 g/dL Final    Total Bilirubin 02/01/2017 0.3  0.1 - 1.0 mg/dL Final    Alkaline Phosphatase 02/01/2017 56  55 - 135 U/L Final    AST 02/01/2017 15  10 - 40 U/L Final " "   ALT 02/01/2017 15  10 - 44 U/L Final    Anion Gap 02/01/2017 8  8 - 16 mmol/L Final    eGFR if African American 02/01/2017 >60.0  >60 mL/min/1.73 m^2 Final    eGFR if non African American 02/01/2017 >60.0  >60 mL/min/1.73 m^2 Final       All Medications    Current Outpatient Prescriptions:     dextroamphetamine-amphetamine (AMPHETAMINE SALT COMBO) 10 mg Tab, Take 20 mg by mouth 2 (two) times daily., Disp: 60 tablet, Rfl: 0    norethindrone-ethinyl estradiol (JUNEL FE 1/20) 1 mg-20 mcg (21)/75 mg (7) per tablet, Take 1 tablet by mouth once daily., Disp: 84 tablet, Rfl: 3    Psychiatric Mental Status Exam  Appearance: unremarkable, age appropriate, well groomed   Behavior/Cooperation: appropriate friendly and cooperative  Speech: appropriate rate, volume and tone  Mood: "good"  Affect:  full; reactive, bright  Thought Process: normal and logical  Thought Content: normal, no suicidality, no homicidality, delusions, or paranoia  Sensorium:    grossly intact  Alert and Oriented: x3  Memory: grossly intact    Attention/concentration: appropriate for age/education.  Similarities:  grossly intact  Abstract reasoning: grossly intact  Insight: Intact  Judgment: Intact    ASSESSMENT      Impression:      ICD-10-CM ICD-9-CM   1. ADHD (attention deficit hyperactivity disorder), combined type F90.2 314.01       Status/Progress: Based on the examination today, the patient's problem(s) is/are improved.  New problems have not been presented today.   Co-morbidities are not complicating management of the primary condition.  There are no active rule-out diagnoses for this patient at this time.     TREATMENT PLAN     Medications:  - Continue Adderall XR 30mg daily (will take at 6pm when working nights)  - Extra 10mg Adderall IR  #15/mo for days when she has a shorter shift in place of the Adderall XR      - EKG and CMP from feb WNL      - Side effects of stimulants include but are not limited to insomnia, headache, anxiety, " psychosis, anorexia, hypertension, cardiovascular effects. These were discussed and understood by the patient. In addition, recommended patient use some sort of birth control if she is sexually active as the medication can adversely affect a fetus (Pt is on birth control pills). Patient expressed understanding of this information.  Patient denies any side effects from medication at this time.  Patient advised not to take afternoon dose after 4pm as it can interfere with her sleep if taken that late in the day. Pt was advised to take drug holiday.      Risks, benefits, side effects and alternative treatments discussed with patient. Patient agrees with the current plan as documented.  Encouraged Patient to keep future appointments.  Take medications as prescribed and abstain from substance abuse.  Pt to present to ED for thoughts to harm herself or others      Return to Clinic:  3 months      Total time:20 minutes        Eva Mobley MD  Psychiatry PGY-3  [phone]

## 2018-04-05 ENCOUNTER — NURSE TRIAGE (OUTPATIENT)
Dept: ADMINISTRATIVE | Facility: CLINIC | Age: 23
End: 2018-04-05

## 2018-04-05 NOTE — TELEPHONE ENCOUNTER
"    Reason for Disposition   Minor mouth injury (all triage questions negative)    Answer Assessment - Initial Assessment Questions  1. MECHANISM: "How did the injury happen?"       Pt was laying on her belly on the floor, resting her chin on her phone, the phone slipped, and tore/lacerated the frenum    2. ONSET: "When did the injury happen?" (Minutes or hours ago)   Just Prior to call      3. LOCATION: "What part of the mouth is injured?"       Inside of upper lip, frenum  4. APPEARANCE of INJURY: "What does the mouth look like?"       Torn tissue/cut including and next to the frenum  5. BLEEDING: "Is the mouth still bleeding?" If so, ask: "Is it difficult to stop?"       Scant and has stopped  6. SIZE: For cuts, bruises, or swelling, ask: "How large is it?" (e.g., inches or centimeters; entire lip)         7. PAIN: "Is it painful?" If so, ask: "How bad is the pain?"   (e.g., Scale 1-10; or mild, moderate, severe)      Denies pain, just swelling/tenderness  8. TETANUS: For any breaks in the skin, ask: "When was the last tetanus booster?"      Did not assess  9. OTHER SYMPTOMS: "Are you having any trouble breathing?"     no  10. PREGNANCY: "Is there any chance you are pregnant?" "When was your last menstrual period?"        Did not assess    Caller just concerned about getting an infection in her mouth.    Protocols used:  MOUTH INJURY-A-      "

## 2018-06-18 ENCOUNTER — OFFICE VISIT (OUTPATIENT)
Dept: PSYCHIATRY | Facility: CLINIC | Age: 23
End: 2018-06-18
Payer: COMMERCIAL

## 2018-06-18 VITALS
DIASTOLIC BLOOD PRESSURE: 75 MMHG | HEIGHT: 63 IN | BODY MASS INDEX: 32.89 KG/M2 | HEART RATE: 74 BPM | WEIGHT: 185.63 LBS | SYSTOLIC BLOOD PRESSURE: 116 MMHG

## 2018-06-18 DIAGNOSIS — F41.9 ANXIETY: ICD-10-CM

## 2018-06-18 DIAGNOSIS — F90.2 ADHD (ATTENTION DEFICIT HYPERACTIVITY DISORDER), COMBINED TYPE: Primary | ICD-10-CM

## 2018-06-18 DIAGNOSIS — G47.26 SHIFTING SLEEP-WORK SCHEDULE, AFFECTING SLEEP: ICD-10-CM

## 2018-06-18 PROCEDURE — 99215 OFFICE O/P EST HI 40 MIN: CPT | Mod: S$GLB,,, | Performed by: PSYCHIATRY & NEUROLOGY

## 2018-06-18 PROCEDURE — 99999 PR PBB SHADOW E&M-EST. PATIENT-LVL III: CPT | Mod: PBBFAC,,, | Performed by: PSYCHIATRY & NEUROLOGY

## 2018-06-18 RX ORDER — DEXTROAMPHETAMINE SACCHARATE, AMPHETAMINE ASPARTATE, DEXTROAMPHETAMINE SULFATE AND AMPHETAMINE SULFATE 2.5; 2.5; 2.5; 2.5 MG/1; MG/1; MG/1; MG/1
10 TABLET ORAL DAILY PRN
Qty: 30 TABLET | Refills: 0 | Status: SHIPPED | OUTPATIENT
Start: 2018-06-18 | End: 2018-10-11 | Stop reason: SDUPTHER

## 2018-06-18 RX ORDER — HYDROXYZINE PAMOATE 25 MG/1
25 CAPSULE ORAL 2 TIMES DAILY PRN
Qty: 60 CAPSULE | Refills: 2 | Status: SHIPPED | OUTPATIENT
Start: 2018-06-18 | End: 2019-04-08

## 2018-06-18 RX ORDER — LISDEXAMFETAMINE DIMESYLATE 30 MG/1
30 CAPSULE ORAL DAILY
Qty: 30 CAPSULE | Refills: 0 | Status: SHIPPED | OUTPATIENT
Start: 2018-06-18 | End: 2018-10-11 | Stop reason: SDUPTHER

## 2018-06-18 NOTE — PROGRESS NOTES
"6/18/2018 3:16 PM   Bernarda Ly   1995   [id number]           OUTPATIENT PSYCHIATRY FOLLOW- UP VISIT    Reason for Encounter:  Bernarda Ly, a 23 y.o. female,who has been following with [facility] psych for many years , last seen by   presents today for follow up of   Chief Complaint   Patient presents with    Inattention    Anxiety   .  Met with patient.    Interval History and Content of Current Session:    Today,  Pt reports that she is doing ok but feels the medication can be titrated further. She complains of tolerance with current dose. Report that initially the 30mg XR Adderall worked for the first 2 weeks or so but later finds that it has not been as effective and notes wear off symptoms as well in the evening. Reports feeling more irritable and tired. She also reports that she and her  are in the processes of moving and this has been challenging for pt because she feels overwhelmed with the where everything is and keeps going from one task to another.       Social stressors:  work    Psychiatric Review Of Systems - Is patient experiencing or having changes in:    ADHD Rating Scale IV (ADHD-RS-IV) With Adult Prompts  Occurring in 2 domains- Work and Home of the following when NOT on medicaiton:-  By rating None-0; Mild-1; Moderate-2; severe-3    INATTENTION  1) Carelessness- 1  2) Difficulty sustaining attention in activities- 3  3) Doesn't Listen- 2  4) No follow through- 1  5) Cant Organize-2  6) Avoid/ dislike tasks requiring sustained mental effort- 2  7) Loses important items- 0  8) Easily distractible- 3  9) Forgetful in daily activities-2    HYPERACTIVITY  10) Squirms and fidgets-3  11) Cant stay seated-2  12) Runs/climbs excessively-0  13) Cant play/work quietly-1  14) On the go "driven by a motor"-3  15) Talks excessively - 3  16) Blurts out answers- 3  17) Cant wait for turns- 2  18) Intrudes/ interrupts others- 2    TOTAL SCORE-  Meets criteria for ADHD - predominantly " inattention type/ hyperactive type/ combined type    Symptoms of THEO: + excessive anxiety/worry/fear, more days than not, about mostly stressors Sometimes difficult to control, with restlessness poor concentration, irritability  NO fatigue,muscle tension, sleep disturbance; causes functionally impairing distress   LINDY today scored 15- low anxiety    Symptoms of Depression: NO diminished mood or loss of interest/anhedonia; irritability, diminished energy, change in sleep, change in appetite, diminished concentration or cognition or indecisiveness, PMA/R, excessive guilt or hopelessness or worthlessness, suicidal ideations    Symptoms of ronn or hypomania: NO elevated, expansive, or irritable mood with increased energy or activity; with inflated self-esteem or grandiosity, decreased need for sleep, increased rate of speech, FOI or racing thoughts, distractibility, increased goal directed activity or PMA, risky/disinhibited behavior    Symptoms of psychosis: NO hallucinations, delusions, disorganized thinking, disorganized behavior or abnormal motor behavior, or negative symptoms (diminshed emotional expression, avolition, anhedonia, alogia, asociality     Sleep: 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  NO  Problems with initiation, maintenance, early morning awakening with inability to return to sleep      Risk Parameters:  Patient reports no suicidal ideation  Patient reports no homicidal ideation  Patient reports no self-injurious behavior  Patient reports no violent behavior    Psychotropic medication review  Previous Trials-  Ritalin and Concerta- during HS and middle school and was not sure about the effect    Current meds-   Adderall XR 30mg daily (will take at 6pm when working nights)  + Extra 10mg Adderall IR  PRN    Substance use  Tobacco-none  ETOH-none  Illicit substances-none    Review of Systems     Past Medical, Family and Social History: The  "patient's past medical, family and social history have been reviewed and updated as appropriate within the electronic medical record - see encounter notes.    Compliance: yes    Side effects: None      Objective     Constitutional  Vitals:  Most recent vital signs, dated less than 90 days prior to this appointment, were reviewed.    Vitals:    06/18/18 1454   BP: 116/75   Pulse: 74   Weight: 84.2 kg (185 lb 10 oz)   Height: 5' 3" (1.6 m)            Laboratory Data: reviewed most recent labs and noted any abnormalities.    Medications:  Outpatient Encounter Prescriptions as of 6/18/2018   Medication Sig Dispense Refill    dextroamphetamine-amphetamine 10 mg Tab Take 10 mg by mouth daily as needed. 30 tablet 0    hydrOXYzine pamoate (VISTARIL) 25 MG Cap Take 1 capsule (25 mg total) by mouth 2 (two) times daily as needed. 60 capsule 2    lisdexamfetamine (VYVANSE) 30 MG capsule Take 1 capsule (30 mg total) by mouth once daily. 30 capsule 0    norethindrone-e.estradiol-iron (BLISOVI 24 FE) 1 mg-20 mcg (24)/75 mg (4) Oral per tablet Take 1 tablet by mouth once daily. 84 tablet 4     No facility-administered encounter medications on file as of 6/18/2018.        Allergy:  Review of patient's allergies indicates:   Allergen Reactions    Sulfa (sulfonamide antibiotics)        Nutritional Screening: Considering the patient's height and weight, medications, medical history and preferences, should a referral be made to the dietitian? no    Review of Systems - History obtained from the patient  General ROS: negative  Respiratory ROS: no cough, shortness of breath, or wheezing  Cardiovascular ROS: no chest pain or dyspnea on exertion  Gastrointestinal ROS: no abdominal pain, change in bowel habits, or black or bloody stools  Musculoskeletal ROS:no dystonia, no tremor; non-ataxic  Neurological ROS: no TIA or stroke symptoms    Mental Status Exam:  Appearance: unremarkable, age appropriate, casually " "dressed  Behavior/Cooperation:appropriate friendly and cooperative   Speech: appropriate rate, volume and tone spontaneous  Language: uses words appropriately; NO aphasia or dysarthria  Mood: "  ok "  Affect:   euthymic congruent with mood and appropriate to situation/content   Thought Process:  normal and logical  Thought Content: normal, no suicidality, no homicidality, delusions, or paranoia  Sensorium:  Awake  Alert and Oriented: x3 grossly intact  Memory: Intact to conversation both recent and remote  Attention/concentration: appropriate for age/education and intact to conversation  Insight: Intact  Judgment:Intact      Assessment and Diagnosis   Status/Progress: Based on the examination today, the patient's problem(s) is/are adequately but not ideally controlled.  New problems have been presented today.   Co-morbidities and Diagnostic uncertainty are complicating management of the primary condition.  There are no active rule-out diagnoses for this patient at this time.     General Impression:       ICD-10-CM ICD-9-CM   1. ADHD (attention deficit hyperactivity disorder), combined type F90.2 314.01   2. Shifting sleep-work schedule, affecting sleep G47.26 327.36   3. Anxiety F41.9 300.00       Intervention/Counseling/Treatment Plan   · Medication Management: -  · Start Vyvanse 30mg qd may need to increase - provided only x 1 month  · Continue PRN Adderall IR 10mg - provided only x 1 month  · Start PRN vistaril 15mg bid prn for anxiety or insomnia and Melatonin 5mg-10mg PRN for shift work sleep problems  · Labs: reviewed most recent  · The treatment plan and follow up plan were reviewed with the patient.  · Discussed with patient informed consent, risks vs. benefits, alternative treatments, side effect profile and the inherent unpredictability of individual responses to these treatments. The patient expresses understanding of the above and displays the capacity to agree with this current plan and had no other " questions.  · Encouraged Patient to keep future appointments.   · Take medications as prescribed and abstain from substance abuse.   · In the event of an emergency patient was advised to go to the emergency room.    Return to Clinic: 3 months    > than 50% of total time spend on coordination of care and counseling   (which included pts differential diagnosis and prognosis for psychiatric conditions, risks, benefits of treatments, instructions and adherence to treatment plan, risk reduction, reviewing current psychiatric medication regimen, medical problems and social stressors. In addtion to possible discussion with other healthcare provider/s)    Add on Psychotherapy time:0  Total Face time:60mins    CATHERINE BAUGH MD   [facility]   6/18/2018 3:16 PM

## 2018-06-18 NOTE — PATIENT INSTRUCTIONS
[facility]  Mental Health Medications: Stimulants    What are the side effects?    Most side effects are minor and disappear when dosage levels are lowered. The most common side effects include:   Decreased appetite. People seem to be less hungry during the middle of the day, but they are often hungry by dinnertime as the medication wears off.   Sleep problems. If a person cannot fall asleep, the doctor may prescribe a lower dose. The doctor might also suggest that the medication is taken earlier in the day, or stop the afternoon or evening dose.    Stomachaches and headaches.   Tics.  A few people develop sudden, repetitive movements or sounds called tics. These tics may or may not be noticeable. Changing the medication dosage may make tics go away. Some people also may appear to have a personality change, such as appearing flat or without emotion. Talk with yourdoctor if you see any of these side effects.    FDA warning on possible rare side effects    In 2007, the FDA required that all makers of ADHD medications develop Patient Medication Guides. The guides must alert patients to possible heart and psychiatric problems related to ADHD medicine. The FDA required the Patient Medication Guides because a review of data found that ADHD patients with heart conditions had a slightly higher risk of strokes, heart attacks, and sudden death when taking the medications. The review also found a slightly higher risk (about 1 in 1,000) for medication-related psychiatric problems, such as hearing voices, having hallucinations, becoming suspicious for no reason, or becoming manic. This happened to patients who had no history of psychiatric problems.    Stimulant medications may lead to drug abuse or dependence, but the risk is highest for those patient taking this class of medication who do not have ADHD. Research shows that teens with ADHD who took stimulant medications were less likely to abuse  drugs than those who did not take stimulant medications.  Proper diagnosis is the key to minimizing this risk.

## 2018-06-19 PROBLEM — G47.26 SHIFTING SLEEP-WORK SCHEDULE, AFFECTING SLEEP: Status: ACTIVE | Noted: 2018-06-19

## 2018-06-19 PROBLEM — G47.9 SLEEP DIFFICULTIES: Status: ACTIVE | Noted: 2018-06-19

## 2018-06-19 PROBLEM — F41.9 ANXIETY: Status: ACTIVE | Noted: 2018-06-19

## 2018-10-11 ENCOUNTER — OFFICE VISIT (OUTPATIENT)
Dept: PSYCHIATRY | Facility: CLINIC | Age: 23
End: 2018-10-11
Payer: COMMERCIAL

## 2018-10-11 VITALS
BODY MASS INDEX: 32.46 KG/M2 | HEART RATE: 71 BPM | DIASTOLIC BLOOD PRESSURE: 73 MMHG | SYSTOLIC BLOOD PRESSURE: 111 MMHG | HEIGHT: 63 IN | WEIGHT: 183.19 LBS

## 2018-10-11 DIAGNOSIS — F41.9 ANXIETY DISORDER, UNSPECIFIED TYPE: ICD-10-CM

## 2018-10-11 DIAGNOSIS — F90.2 ADHD (ATTENTION DEFICIT HYPERACTIVITY DISORDER), COMBINED TYPE: Primary | ICD-10-CM

## 2018-10-11 PROCEDURE — 99213 OFFICE O/P EST LOW 20 MIN: CPT | Mod: S$GLB,,, | Performed by: NURSE PRACTITIONER

## 2018-10-11 PROCEDURE — 99999 PR PBB SHADOW E&M-EST. PATIENT-LVL II: CPT | Mod: PBBFAC,,, | Performed by: NURSE PRACTITIONER

## 2018-10-11 RX ORDER — DEXTROAMPHETAMINE SACCHARATE, AMPHETAMINE ASPARTATE, DEXTROAMPHETAMINE SULFATE AND AMPHETAMINE SULFATE 2.5; 2.5; 2.5; 2.5 MG/1; MG/1; MG/1; MG/1
10 TABLET ORAL DAILY PRN
Qty: 15 TABLET | Refills: 0 | Status: SHIPPED | OUTPATIENT
Start: 2018-10-11 | End: 2018-11-11

## 2018-10-11 RX ORDER — LISDEXAMFETAMINE DIMESYLATE 30 MG/1
30 CAPSULE ORAL EVERY MORNING
Qty: 30 CAPSULE | Refills: 0 | Status: SHIPPED | OUTPATIENT
Start: 2018-11-12 | End: 2021-11-18 | Stop reason: SDUPTHER

## 2018-10-11 RX ORDER — LISDEXAMFETAMINE DIMESYLATE 30 MG/1
30 CAPSULE ORAL DAILY
Qty: 30 CAPSULE | Refills: 0 | Status: SHIPPED | OUTPATIENT
Start: 2018-10-11 | End: 2018-11-11

## 2018-10-11 RX ORDER — DEXTROAMPHETAMINE SACCHARATE, AMPHETAMINE ASPARTATE, DEXTROAMPHETAMINE SULFATE AND AMPHETAMINE SULFATE 2.5; 2.5; 2.5; 2.5 MG/1; MG/1; MG/1; MG/1
10 TABLET ORAL DAILY PRN
Qty: 15 TABLET | Refills: 0 | Status: SHIPPED | OUTPATIENT
Start: 2018-11-12 | End: 2018-12-11

## 2018-10-11 NOTE — PROGRESS NOTES
"Outpatient Psychiatry Follow-Up Visit (MD/NP)    10/11/2018    Clinical Status of Patient:  Outpatient (Ambulatory)    Chief Complaint:  Bernarda Ly is a 23 y.o. female who presented today for follow-up of anxiety and attention problems.  This patient is new to me and this is our first session. Patient's chart was reviewed. Met with patient.      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 She stated that she was diagnosed with ADHD at age 3 and has been taking stimulant for many years. She tried concerta, ritalin, and adderall. She is currently taking vyvanse and it is helping her to focus, complete tasks, increase motivation, and productivity. Stated that she feels irritable at the end of the day and was not sure if it was related to vyvanse or related to working 12 hour shifts. Reported that she tried vistaril 10 mg when she felt anxious but did not like the way she felt while taking it. Reported mood was "good" and affect was euthymic. Stated that she is medication compliant with vyvanse 30mg and checked  and no signs of misuse. Stated that she talked to her OBGYN about her desire to get pregnant in the near future. She stated that her doctor recommended that she gets off stimulants 2 months before she gets pregnant. Denied feeling depressed and reported adequate sleep and appetite. She denied history of seizure, ronn or depression.     Psychotherapy:  · Target symptoms: lack of focus, anxiety   · Why chosen therapy is appropriate versus another modality: evidence based practice  · Outcome monitoring methods: self-report  · Therapeutic intervention type: supportive psychotherapy  · Topics discussed/themes: building skills sets for symptom management, symptom recognition  · The patient's response to the intervention is motivated. The " "patient's progress toward treatment goals is good.   · Duration of intervention:17 minutes.    Review of Systems   · PSYCHIATRIC: Pertinant items are noted in the narrative.  · CONSTITUTIONAL: No weight gain or loss.   · MUSCULOSKELETAL: No pain or stiffness of the joints.  · NEUROLOGIC: No weakness, sensory changes, seizures, confusion, memory loss, tremor or other abnormal movements.  · ENDOCRINE: No polydipsia or polyuria.  · INTEGUMENTARY: No rashes or lacerations.  · EYES: No exophthalmos, jaundice or blindness.  · ENT: No dizziness, tinnitus or hearing loss.  · RESPIRATORY: No shortness of breath.  · CARDIOVASCULAR: No tachycardia or chest pain.  · GASTROINTESTINAL: No nausea, vomiting, pain, constipation or diarrhea.  · GENITOURINARY: No frequency, dysuria or sexual dysfunction.  · HEMATOLOGIC/LYMPHATIC: No excessive bleeding, prolonged or excessive bleeding after dental extraction/injury.  · ALLERGIC/IMMUNOLOGIC: No allergic response to materials, foods or animals at this time.    Past Medical, Family and Social History: The patient's past medical, family and social history have been reviewed and updated as appropriate within the electronic medical record - see encounter notes.    Compliance: yes    Side effects: irritability    Risk Parameters:  Patient reports no suicidal ideation  Patient reports no homicidal ideation  Patient reports no self-injurious behavior  Patient reports no violent behavior    Exam (detailed: at least 9 elements; comprehensive: all 15 elements)   Constitutional  Vitals:  Most recent vital signs, dated greater than 90 days prior to this appointment, were reviewed.   Vitals:    10/11/18 1058   BP: 111/73   Pulse: 71   Weight: 83.1 kg (183 lb 3.2 oz)   Height: 5' 3" (1.6 m)        General:  unremarkable, age appropriate     Musculoskeletal  Muscle Strength/Tone:  no dystonia, no tremor, no tic   Gait & Station:  non-ataxic     Psychiatric  Speech:  no latency; no press   Mood & " "Affect:  "good"  congruent and appropriate   Thought Process:  normal and logical   Associations:  intact   Thought Content:  normal, no suicidality, no homicidality, delusions, or paranoia   Insight:  intact   Judgement: behavior is adequate to circumstances   Orientation:  grossly intact   Memory: intact for content of interview   Language: grossly intact   Attention Span & Concentration:  able to focus   Fund of Knowledge:  intact and appropriate to age and level of education     Assessment and Diagnosis   Status/Progress: Based on the examination today, the patient's problem(s) is/are improved.  New problems have not been presented today.   Co-morbidities are not complicating management of the primary condition.  There are no active rule-out diagnoses for this patient at this time.     General Impression: Patient has prior diagnosis of attention deficit disorder combined type and has anxiety symptoms that meets the criteria for anxiety disorder unspecified. Her ADHD symptoms are controlled with vyvanse. Patient will try melatonin for her sleep problems due to work shift.      ICD-10-CM ICD-9-CM   1. ADHD (attention deficit hyperactivity disorder), combined type F90.2 314.01   2. Anxiety disorder, unspecified type F41.9 300.00       Intervention/Counseling/Treatment Plan   ? Medication Management: Continue current medications. The risks and benefits of medication were discussed with the patient.   ? Continue Vyvanse 30mg qd -2 months postdated  ? Continue PRN Adderall IR 10mg -2 month postdated   ? Continue PRN vistaril 15mg bid prn for anxiety or insomnia and Melatonin 5mg-10mg PRN for shift work sleep problems  · Labs: reviewed most recent  · The treatment plan and follow up plan were reviewed with the patient.  · Discussed with patient informed consent, risks vs. benefits, alternative treatments, side effect profile and the inherent unpredictability of individual responses to these treatments. The patient " expresses understanding of the above and displays the capacity to agree with this current plan and had no other questions.  · Encouraged Patient to keep future appointments.   · Take medications as prescribed and abstain from substance abuse.   · In the event of an emergency patient was advised to go to the emergency room.        Return to Clinic: 3 months

## 2018-10-11 NOTE — PATIENT INSTRUCTIONS
Amphetamine; Dextroamphetamine tablets  What is this medicine?  AMPHETAMINE; DEXTROAMPHETAMINE(am FET a meen; dex troe am FET a meen) is used to treat attention-deficit hyperactivity disorder (ADHD). It may also be used for narcolepsy. Federal law prohibits giving this medicine to any person other than the person for whom it was prescribed. Do not share this medicine with anyone else.  How should I use this medicine?  Take this medicine by mouth with a glass of water. Follow the directions on the prescription label. Take your doses at regular intervals. Do not take your medicine more often than directed. Do not suddenly stop your medicine. You must gradually reduce the dose or you may feel withdrawal effects. Ask your doctor or health care professional for advice.  Talk to your pediatrician regarding the use of this medicine in children. Special care may be needed. While this drug may be prescribed for children as young as 3 years for selected conditions, precautions do apply.  What side effects may I notice from receiving this medicine?  Side effects that you should report to your doctor or health care professional as soon as possible:  · allergic reactions like skin rash, itching or hives, swelling of the face, lips, or tongue  · changes in vision  · chest pain or chest tightness  · confusion, trouble speaking or understanding  · fast, irregular heartbeat  · fingers or toes feel numb, cool, painful  · hallucination, loss of contact with reality  · high blood pressure  · males: prolonged or painful erection  · seizures  · severe headaches  · shortness of breath  · suicidal thoughts or other mood changes  · trouble walking, dizziness, loss of balance or coordination  · uncontrollable head, mouth, neck, arm, or leg movements  Side effects that usually do not require medical attention (report to your doctor or health care professional if they continue or are bothersome):  · anxious  · headache  · loss of  appetite  · nausea, vomiting  · trouble sleeping  · weight loss  What may interact with this medicine?  Do not take this medicine with any of the following medications:  · MAOIS like Carbex, Eldepryl, Marplan, Nardil, and Parnate  · other stimulant medicines for attention disorders, weight loss, or to stay awake  This medicine may also interact with the following medications:  · acetazolamide  · ammonium chloride  · antacids  · ascorbic acid  · atomoxetine  · caffeine  · certain medicines for blood pressure  · certain medicines for depression, anxiety, or psychotic disturbances  · certain medicines for seizures like carbamazepine, phenobarbital, phenytoin  · certain medicines for stomach problems like cimetidine, famotidine, omeprazole, lansoprazole  · cold or allergy medicines  · glutamic acid  · lithium  · meperidine  · methenamine; sodium acid phosphate  · narcotic medicines for pain  · norepinephrine  · phenothiazines like chlorpromazine, mesoridazine, prochlorperazine, thioridazine  · sodium acid phosphate  · sodium bicarbonate  What if I miss a dose?  If you miss a dose, take it as soon as you can. If it is almost time for your next dose, take only that dose. Do not take double or extra doses.  Where should I keep my medicine?  Keep out of the reach of children. This medicine can be abused. Keep your medicine in a safe place to protect it from theft. Do not share this medicine with anyone. Selling or giving away this medicine is dangerous and against the law.  Store at room temperature between 15 and 30 degrees C (59 and 86 degrees F). Keep container tightly closed. Throw away any unused medicine after the expiration date. Dispose of properly. This medicine may cause accidental overdose and death if it is taken by other adults, children, or pets. Mix any unused medicine with a substance like cat litter or coffee grounds. Then throw the medicine away in a sealed container like a sealed bag or a coffee can with  a lid. Do not use the medicine after the expiration date.  What should I tell my health care provider before I take this medicine?  They need to know if you have any of these conditions:  · anxiety or panic attacks  · circulation problems in fingers and toes  · glaucoma  · hardening or blockages of the arteries or heart blood vessels  · heart disease or a heart defect  · high blood pressure  · history of a drug or alcohol abuse problem  · history of stroke  · kidney disease  · liver disease  · mental illness  · seizures  · suicidal thoughts, plans, or attempt; a previous suicide attempt by you or a family member  · thyroid disease  · Tourette's syndrome  · an unusual or allergic reaction to dextroamphetamine, other amphetamines, other medicines, foods, dyes, or preservatives  · pregnant or trying to get pregnant  · breast-feeding  What should I watch for while using this medicine?  Visit your doctor or health care professional for regular checks on your progress. This prescription requires that you follow special procedures with your doctor and pharmacy. You will need to have a new written prescription from your doctor every time you need a refill.  This medicine may affect your concentration, or hide signs of tiredness. Until you know how this medicine affects you, do not drive, ride a bicycle, use machinery, or do anything that needs mental alertness.  Tell your doctor or health care professional if this medicine loses its effects, or if you feel you need to take more than the prescribed amount. Do not change the dosage without talking to your doctor or health care professional.  Decreased appetite is a common side effect when starting this medicine. Eating small, frequent meals or snacks can help. Talk to your doctor if you continue to have poor eating habits. Height and weight growth of a child taking this medicine will be monitored closely.  Do not take this medicine close to bedtime. It may prevent you from  sleeping.  If you are going to need surgery, a MRI, CT scan, or other procedure, tell your doctor that you are taking this medicine. You may need to stop taking this medicine before the procedure.  Tell your doctor or healthcare professional right away if you notice unexplained wounds on your fingers and toes while taking this medicine. You should also tell your healthcare provider if you experience numbness or pain, changes in the skin color, or sensitivity to temperature in your fingers or toes.  NOTE:This sheet is a summary. It may not cover all possible information. If you have questions about this medicine, talk to your doctor, pharmacist, or health care provider. Copyright© 2017 [employer]        Lisdexamfetamine Oral Capsule  What is this medicine?  LISDEXAMFETAMINE (lis DEX am fet a meen) is used to treat attention-deficit hyperactivity disorder (ADHD) in adults and children. It is also used to treat binge-eating disorder in adults. Federal law prohibits giving this medicine to any person other than the person for whom it was prescribed. Do not share this medicine with anyone else.  How should I use this medicine?  Take this medicine by mouth. Follow the directions on the prescription label. Swallow the capsules with a drink of water. You may open capsule and add to a glass of water, then drink right away. Take your doses at regular intervals. Do not take your medicine more often than directed. Do not suddenly stop your medicine. You must gradually reduce the dose or you may feel withdrawal effects. Ask your doctor or health care professional for advice.  A special MedGuide will be given to you by the pharmacist with each prescription and refill. Be sure to read this information carefully each time.  Talk to your pediatrician regarding the use of this medicine in children. While this drug may be prescribed for children as young as 6 years of age for selected conditions, precautions do apply.  What side  effects may I notice from receiving this medicine?  Side effects that you should report to your doctor or health care professional as soon as possible:  · allergic reactions like skin rash, itching or hives, swelling of the face, lips, or tongue  · changes in vision  · chest pain or chest tightness  · confusion, trouble speaking or understanding  · fast, irregular heartbeat  · fingers or toes feel numb, cool, painful  · hallucination, loss of contact with reality  · high blood pressure  · males: prolonged or painful erection  · seizures  · severe headaches  · shortness of breath  · suicidal thoughts or other mood changes  · trouble walking, dizziness, loss of balance or coordination  · uncontrollable head, mouth, neck, arm, or leg movements  Side effects that usually do not require medical attention (report to your doctor or health care professional if they continue or are bothersome):  · anxious  · headache  · loss of appetite  · nausea, vomiting  · trouble sleeping  · weight loss  What may interact with this medicine?  Do not take this medicine with any of the following medications:  · MAOIs like Carbex, Eldepryl, Marplan, Nardil, and Parnate  · other stimulant medicines for attention disorders, weight loss, or to stay awake  This medicine may also interact with the following medications:  · acetazolamide  · ammonium chloride  · antacids  · ascorbic acid  · atomoxetine  · caffeine  · certain medicines for blood pressure  · certain medicines for depression, anxiety, or psychotic disturbances  · certain medicines for seizures like carbamazepine, phenobarbital, phenytoin  · certain medicines for stomach problems like cimetidine, famotidine, omeprazole, lansoprazole  · cold or allergy medicines  · green tea  · levodopa  · linezolid  · medicines for sleep during surgery  · methenamine  · norepinephrine  · phenothiazines like chlorpromazine, mesoridazine, prochlorperazine, thioridazine  · propoxyphene  · sodium acid  phosphate  · sodium bicarbonate  What if I miss a dose?  If you miss a dose, take it as soon as you can. If it is almost time for your next dose, take only that dose. Do not take double or extra doses.  Where should I keep my medicine?  Keep out of the reach of children. This medicine can be abused. Keep your medicine in a safe place to protect it from theft. Do not share this medicine with anyone. Selling or giving away this medicine is dangerous and against the law.  Store at room temperature between 15 and 30 degrees C (59 and 86 degrees F). Protect from light. Keep container tightly closed. Throw away any unused medicine after the expiration date.  What should I tell my health care provider before I take this medicine?  They need to know if you have any of these conditions:  · anxiety or panic attacks  · circulation problems in fingers and toes  · glaucoma  · hardening or blockages of the arteries or heart blood vessels  · heart disease or a heart defect  · high blood pressure  · history of a drug or alcohol abuse problem  · history of stroke  · kidney disease  · liver disease  · mental illness  · seizures  · suicidal thoughts, plans, or attempt; a previous suicide attempt by you or a family member  · thyroid disease  · Tourette's syndrome  · an unusual or allergic reaction to lisdexamfetamine, other medicines, foods, dyes, or preservatives  · pregnant or trying to get pregnant  · breast-feeding  What should I watch for while using this medicine?  Visit your doctor for regular check ups. This prescription requires that you follow special procedures with your doctor and pharmacy. You will need to have a new written prescription from your doctor every time you need a refill.  This medicine may affect your concentration, or hide signs of tiredness. Until you know how this medicine affects you, do not drive, ride a bicycle, use machinery, or do anything that needs mental alertness.  Tell your doctor or health care  professional if this medicine loses its effects, or if you feel you need to take more than the prescribed amount. Do not change your dose without talking to your doctor or health care professional.  Decreased appetite is a common side effect when starting this medicine. Eating small, frequent meals or snacks can help. Talk to your doctor if you continue to have poor eating habits. Height and weight growth of a child taking this medicine will be monitored closely.  Do not take this medicine close to bedtime. It may prevent you from sleeping.  If you are going to need surgery, a MRI, CT scan, or other procedure, tell your doctor that you are taking this medicine. You may need to stop taking this medicine before the procedure.  Tell your doctor or healthcare professional right away if you notice unexplained wounds on your fingers and toes while taking this medicine. You should also tell your healthcare provider if you experience numbness or pain, changes in the skin color, or sensitivity to temperature in your fingers or toes.  NOTE:This sheet is a summary. It may not cover all possible information. If you have questions about this medicine, talk to your doctor, pharmacist, or health care provider. Copyright© 2017 [employer]

## 2018-12-13 ENCOUNTER — PATIENT MESSAGE (OUTPATIENT)
Dept: PSYCHIATRY | Facility: CLINIC | Age: 23
End: 2018-12-13

## 2018-12-13 RX ORDER — DEXTROAMPHETAMINE SACCHARATE, AMPHETAMINE ASPARTATE MONOHYDRATE, DEXTROAMPHETAMINE SULFATE AND AMPHETAMINE SULFATE 7.5; 7.5; 7.5; 7.5 MG/1; MG/1; MG/1; MG/1
30 CAPSULE, EXTENDED RELEASE ORAL EVERY MORNING
Qty: 30 CAPSULE | Refills: 0 | Status: SHIPPED | OUTPATIENT
Start: 2018-12-13 | End: 2019-02-20 | Stop reason: SDUPTHER

## 2018-12-13 NOTE — TELEPHONE ENCOUNTER
Spoke with the patient and she stated that vyvanse 30mg was not effective and wanted to get back to adderall XR 30mg po daily.  I sent a prescription of adderall XR 30mg po daily to her pharmacy. Patient requested a schedule change and informed pt that I will talk to scheduling staff to assist her with scheduling.

## 2019-02-20 ENCOUNTER — OFFICE VISIT (OUTPATIENT)
Dept: PSYCHIATRY | Facility: CLINIC | Age: 24
End: 2019-02-20
Payer: COMMERCIAL

## 2019-02-20 VITALS
HEART RATE: 86 BPM | BODY MASS INDEX: 35.24 KG/M2 | WEIGHT: 198.88 LBS | HEIGHT: 63 IN | DIASTOLIC BLOOD PRESSURE: 70 MMHG | SYSTOLIC BLOOD PRESSURE: 128 MMHG

## 2019-02-20 DIAGNOSIS — G47.26 SHIFTING SLEEP-WORK SCHEDULE, AFFECTING SLEEP: ICD-10-CM

## 2019-02-20 DIAGNOSIS — F41.9 ANXIETY: Primary | ICD-10-CM

## 2019-02-20 DIAGNOSIS — F90.2 ADHD (ATTENTION DEFICIT HYPERACTIVITY DISORDER), COMBINED TYPE: ICD-10-CM

## 2019-02-20 PROCEDURE — 99213 OFFICE O/P EST LOW 20 MIN: CPT | Mod: S$GLB,,, | Performed by: NURSE PRACTITIONER

## 2019-02-20 PROCEDURE — 99999 PR PBB SHADOW E&M-EST. PATIENT-LVL II: CPT | Mod: PBBFAC,,, | Performed by: NURSE PRACTITIONER

## 2019-02-20 PROCEDURE — 99999 PR PBB SHADOW E&M-EST. PATIENT-LVL II: ICD-10-PCS | Mod: PBBFAC,,, | Performed by: NURSE PRACTITIONER

## 2019-02-20 PROCEDURE — 99213 PR OFFICE/OUTPT VISIT, EST, LEVL III, 20-29 MIN: ICD-10-PCS | Mod: S$GLB,,, | Performed by: NURSE PRACTITIONER

## 2019-02-20 RX ORDER — DEXTROAMPHETAMINE SACCHARATE, AMPHETAMINE ASPARTATE, DEXTROAMPHETAMINE SULFATE AND AMPHETAMINE SULFATE 2.5; 2.5; 2.5; 2.5 MG/1; MG/1; MG/1; MG/1
10 TABLET ORAL DAILY PRN
Qty: 30 TABLET | Refills: 0 | Status: SHIPPED | OUTPATIENT
Start: 2019-02-20 | End: 2019-02-20 | Stop reason: SDUPTHER

## 2019-02-20 RX ORDER — DEXTROAMPHETAMINE SACCHARATE, AMPHETAMINE ASPARTATE MONOHYDRATE, DEXTROAMPHETAMINE SULFATE AND AMPHETAMINE SULFATE 7.5; 7.5; 7.5; 7.5 MG/1; MG/1; MG/1; MG/1
30 CAPSULE, EXTENDED RELEASE ORAL EVERY MORNING
Qty: 30 CAPSULE | Refills: 0 | Status: SHIPPED | OUTPATIENT
Start: 2019-04-06 | End: 2019-05-21

## 2019-02-20 RX ORDER — DEXTROAMPHETAMINE SACCHARATE, AMPHETAMINE ASPARTATE, DEXTROAMPHETAMINE SULFATE AND AMPHETAMINE SULFATE 2.5; 2.5; 2.5; 2.5 MG/1; MG/1; MG/1; MG/1
10 TABLET ORAL DAILY PRN
Qty: 30 TABLET | Refills: 0 | Status: SHIPPED | OUTPATIENT
Start: 2019-04-06 | End: 2019-05-29 | Stop reason: SDUPTHER

## 2019-02-20 RX ORDER — DEXTROAMPHETAMINE SACCHARATE, AMPHETAMINE ASPARTATE MONOHYDRATE, DEXTROAMPHETAMINE SULFATE AND AMPHETAMINE SULFATE 7.5; 7.5; 7.5; 7.5 MG/1; MG/1; MG/1; MG/1
30 CAPSULE, EXTENDED RELEASE ORAL EVERY MORNING
Qty: 30 CAPSULE | Refills: 0 | Status: SHIPPED | OUTPATIENT
Start: 2019-02-20 | End: 2019-02-20 | Stop reason: SDUPTHER

## 2019-02-20 NOTE — PATIENT INSTRUCTIONS
Melatonin oral solid dosage forms  What is this medicine?  MELATONIN (daniele tre leonard) is a dietary supplement. It is mostly promoted to help maintain normal sleep patterns. The FDA has not approved this supplement for any medical use.  This supplement may be used for other purposes; ask your health care provider or pharmacist if you have questions.  How should I use this medicine?  Take this supplement by mouth with a glass of water. Do not take with food. This supplement is usually taken 1 or 2 hours before bedtime. After taking this supplement, limit your activities to those needed to prepare for bed. Some products may be chewed or dissolved in the mouth before swallowing. Some tablets or capsules must be swallowed whole; do not cut, crush or chew. Follow the directions on the package labeling, or take as directed by your health care professional. Do not take this supplement more often than directed.  Talk to your pediatrician regarding the use of this supplement in children. Special care may be needed. This supplement is not recommended for use in children without a prescription.  What side effects may I notice from receiving this medicine?  Side effects that you should report to your doctor or health care professional as soon as possible:  · allergic reactions like skin rash, itching or hives, swelling of the face, lips, or tongue  · breathing problems  · change in sex drive or performance  · confusion  · depressed mood, irritable, or other changes in moods or behaviors  · fast, irregular heartbeat  · feeling faint or lightheaded, falls  · irregular or missed menstrual periods  · leakage of milk from the nipples in a person who is not breast-feeding  · signs and symptoms of liver injury like dark yellow or brown urine; general ill feeling or flu-like symptoms; light-colored stools; loss of appetite; nausea; right upper belly pain; unusually weak or tired; yellowing of the eyes or  skin  · sleep-walking  · trouble staying awake or alert during the day  · unusual activities while you are still asleep like driving, eating, making phone calls  · unusual bleeding or bruising  Side effects that usually do not require medical attention (report to your doctor or health care professional if they continue or are bothersome):  · dizziness  · drowsiness  · headache  · tiredness  · unusual dreams or nightmares  · upset stomach  What may interact with this medicine?  Do not take this medicine with any of the following medications:  · fluvoxamine  · ramelteon  · tasimelteon  This medicine may also interact with the following medications:  · alcohol  · atazanavir  · caffeine  · carbamazepine  · certain antibiotics like ciprofloxacin, enoxacin  · certain medicines for depression, anxiety, or psychotic disturbances  · cimetidine  · female hormones, like estrogens and birth control pills, patches, rings, or injections  · methoxsalen  · nifedipine  · other medications for sleep  · other herbal or dietary supplements  · phenobarbital  · rifampin  · smoking tobacco  · tamoxifen  · treatments for cancer, organ transplant, or immune disorders  What if I miss a dose?  If you miss taking your dose at the usual time, skip that dose. If it is almost time for your next dose, take only that dose. Do not take double or extra doses.  Where should I keep my medicine?  Keep out of the reach of children.  Store at room temperature or as directed on the package label. Protect from moisture. Throw away any unused supplement after the expiration date.  What should I tell my health care provider before I take this medicine?  They need to know if you have any of these conditions:  · asthma  · cancer  · depression or mental illness  · diabetes  · hormone problems  · if you often drink alcohol  · immune system problems  · liver disease  · organ transplant  · seizure disorder  · an unusual or allergic reaction to melatonin, other  medicines, foods, dyes, or preservatives  · pregnant or trying to get pregnant  · breast-feeding  What should I watch for while using this medicine?  See your doctor if your symptoms do not get better or if they get worse. Do not take this supplement for more than 2 weeks unless your doctor tells you to.  You may get drowsy or dizzy. Do not drive, use machinery, or do anything that needs mental alertness until you know how this medicine affects you. Do not stand or sit up quickly, especially if you are an older patient. This reduces the risk of dizzy or fainting spells. Alcohol may interfere with the effect of this medicine. Avoid alcoholic drinks.  Talk to your doctor before you use this supplement if you are currently being treated for an emotional, mental, or sleep problem. This medicine may interfere with your treatment.  Herbal or dietary supplements are not regulated like medicines. Rigid  standards are not required for dietary supplements. The purity and strength of these products can vary. The safety and effect of this dietary supplement for a certain disease or illness is not well known. This product is not intended to diagnose, treat, cure or prevent any disease.  The Food and Drug Administration suggests the following to help consumers protect themselves:  · Always read product labels and follow directions.  · Natural does not mean a product is safe for humans to take.  · Look for products that include USP after the ingredient name. This means that the  followed the standards of the [place] Pharmacopoeia.  · Supplements made or sold by a nationally known food or drug company are more likely to be made under tight controls. You can write to the company for more information about how the product was made.  NOTE:This sheet is a summary. It may not cover all possible information. If you have questions about this medicine, talk to your doctor, pharmacist, or health care provider.  Copyright© 2017 [employer]        Hydroxyzine capsules or tablets  What is this medicine?  HYDROXYZINE (uma DROX i zeen) is an antihistamine. This medicine is used to treat allergy symptoms. It is also used to treat anxiety and tension. This medicine can be used with other medicines to induce sleep before surgery.  How should I use this medicine?  Take this medicine by mouth with a full glass of water. Follow the directions on the prescription label. You may take this medicine with food or on an empty stomach. Take your medicine at regular intervals. Do not take your medicine more often than directed.  Talk to your pediatrician regarding the use of this medicine in children. Special care may be needed. While this drug may be prescribed for children as young as 6 years of age for selected conditions, precautions do apply.  Patients over 65 years old may have a stronger reaction and need a smaller dose.  What side effects may I notice from receiving this medicine?  Side effects that you should report to your doctor or health care professional as soon as possible:  · fast or irregular heartbeat  · difficulty passing urine  · seizures  · slurred speech or confusion  · tremor  Side effects that usually do not require medical attention (report to your doctor or health care professional if they continue or are bothersome):  · constipation  · drowsiness  · fatigue  · headache  · stomach upset  What may interact with this medicine?  · alcohol  · barbiturate medicines for sleep or seizures  · medicines for colds, allergies  · medicines for depression, anxiety, or emotional disturbances  · medicines for pain  · medicines for sleep  · muscle relaxants  What if I miss a dose?  If you miss a dose, take it as soon as you can. If it is almost time for your next dose, take only that dose. Do not take double or extra doses.  Where should I keep my medicine?  Keep out of the reach of children.  Store at room temperature between 15  and 30 degrees C (59 and 86 degrees F). Keep container tightly closed. Throw away any unused medicine after the expiration date.  What should I tell my health care provider before I take this medicine?  They need to know if you have any of these conditions:  · any chronic illness  · difficulty passing urine  · glaucoma  · heart disease  · kidney disease  · liver disease  · lung disease  · an unusual or allergic reaction to hydroxyzine, cetirizine, other medicines, foods, dyes, or preservatives  · pregnant or trying to get pregnant  · breast-feeding  What should I watch for while using this medicine?  Tell your doctor or health care professional if your symptoms do not improve.  You may get drowsy or dizzy. Do not drive, use machinery, or do anything that needs mental alertness until you know how this medicine affects you. Do not stand or sit up quickly, especially if you are an older patient. This reduces the risk of dizzy or fainting spells. Alcohol may interfere with the effect of this medicine. Avoid alcoholic drinks.  Your mouth may get dry. Chewing sugarless gum or sucking hard candy, and drinking plenty of water may help. Contact your doctor if the problem does not go away or is severe.  This medicine may cause dry eyes and blurred vision. If you wear contact lenses you may feel some discomfort. Lubricating drops may help. See your eye doctor if the problem does not go away or is severe.  If you are receiving skin tests for allergies, tell your doctor you are using this medicine.  NOTE:This sheet is a summary. It may not cover all possible information. If you have questions about this medicine, talk to your doctor, pharmacist, or health care provider. Copyright© 2017 [employer]        Amphetamine; Dextroamphetamine tablets  What is this medicine?  AMPHETAMINE; DEXTROAMPHETAMINE(am FET a meen; dex troe am FET a meen) is used to treat attention-deficit hyperactivity disorder (ADHD). It may also be used for  narcolepsy. Federal law prohibits giving this medicine to any person other than the person for whom it was prescribed. Do not share this medicine with anyone else.  How should I use this medicine?  Take this medicine by mouth with a glass of water. Follow the directions on the prescription label. Take your doses at regular intervals. Do not take your medicine more often than directed. Do not suddenly stop your medicine. You must gradually reduce the dose or you may feel withdrawal effects. Ask your doctor or health care professional for advice.  Talk to your pediatrician regarding the use of this medicine in children. Special care may be needed. While this drug may be prescribed for children as young as 3 years for selected conditions, precautions do apply.  What side effects may I notice from receiving this medicine?  Side effects that you should report to your doctor or health care professional as soon as possible:  · allergic reactions like skin rash, itching or hives, swelling of the face, lips, or tongue  · changes in vision  · chest pain or chest tightness  · confusion, trouble speaking or understanding  · fast, irregular heartbeat  · fingers or toes feel numb, cool, painful  · hallucination, loss of contact with reality  · high blood pressure  · males: prolonged or painful erection  · seizures  · severe headaches  · shortness of breath  · suicidal thoughts or other mood changes  · trouble walking, dizziness, loss of balance or coordination  · uncontrollable head, mouth, neck, arm, or leg movements  Side effects that usually do not require medical attention (report to your doctor or health care professional if they continue or are bothersome):  · anxious  · headache  · loss of appetite  · nausea, vomiting  · trouble sleeping  · weight loss  What may interact with this medicine?  Do not take this medicine with any of the following medications:  · MAOIS like Carbex, Eldepryl, Marplan, Nardil, and  Parnate  · other stimulant medicines for attention disorders, weight loss, or to stay awake  This medicine may also interact with the following medications:  · acetazolamide  · ammonium chloride  · antacids  · ascorbic acid  · atomoxetine  · caffeine  · certain medicines for blood pressure  · certain medicines for depression, anxiety, or psychotic disturbances  · certain medicines for seizures like carbamazepine, phenobarbital, phenytoin  · certain medicines for stomach problems like cimetidine, famotidine, omeprazole, lansoprazole  · cold or allergy medicines  · glutamic acid  · lithium  · meperidine  · methenamine; sodium acid phosphate  · narcotic medicines for pain  · norepinephrine  · phenothiazines like chlorpromazine, mesoridazine, prochlorperazine, thioridazine  · sodium acid phosphate  · sodium bicarbonate  What if I miss a dose?  If you miss a dose, take it as soon as you can. If it is almost time for your next dose, take only that dose. Do not take double or extra doses.  Where should I keep my medicine?  Keep out of the reach of children. This medicine can be abused. Keep your medicine in a safe place to protect it from theft. Do not share this medicine with anyone. Selling or giving away this medicine is dangerous and against the law.  Store at room temperature between 15 and 30 degrees C (59 and 86 degrees F). Keep container tightly closed. Throw away any unused medicine after the expiration date. Dispose of properly. This medicine may cause accidental overdose and death if it is taken by other adults, children, or pets. Mix any unused medicine with a substance like cat litter or coffee grounds. Then throw the medicine away in a sealed container like a sealed bag or a coffee can with a lid. Do not use the medicine after the expiration date.  What should I tell my health care provider before I take this medicine?  They need to know if you have any of these conditions:  · anxiety or panic  attacks  · circulation problems in fingers and toes  · glaucoma  · hardening or blockages of the arteries or heart blood vessels  · heart disease or a heart defect  · high blood pressure  · history of a drug or alcohol abuse problem  · history of stroke  · kidney disease  · liver disease  · mental illness  · seizures  · suicidal thoughts, plans, or attempt; a previous suicide attempt by you or a family member  · thyroid disease  · Tourette's syndrome  · an unusual or allergic reaction to dextroamphetamine, other amphetamines, other medicines, foods, dyes, or preservatives  · pregnant or trying to get pregnant  · breast-feeding  What should I watch for while using this medicine?  Visit your doctor or health care professional for regular checks on your progress. This prescription requires that you follow special procedures with your doctor and pharmacy. You will need to have a new written prescription from your doctor every time you need a refill.  This medicine may affect your concentration, or hide signs of tiredness. Until you know how this medicine affects you, do not drive, ride a bicycle, use machinery, or do anything that needs mental alertness.  Tell your doctor or health care professional if this medicine loses its effects, or if you feel you need to take more than the prescribed amount. Do not change the dosage without talking to your doctor or health care professional.  Decreased appetite is a common side effect when starting this medicine. Eating small, frequent meals or snacks can help. Talk to your doctor if you continue to have poor eating habits. Height and weight growth of a child taking this medicine will be monitored closely.  Do not take this medicine close to bedtime. It may prevent you from sleeping.  If you are going to need surgery, a MRI, CT scan, or other procedure, tell your doctor that you are taking this medicine. You may need to stop taking this medicine before the procedure.  Tell your  doctor or healthcare professional right away if you notice unexplained wounds on your fingers and toes while taking this medicine. You should also tell your healthcare provider if you experience numbness or pain, changes in the skin color, or sensitivity to temperature in your fingers or toes.  NOTE:This sheet is a summary. It may not cover all possible information. If you have questions about this medicine, talk to your doctor, pharmacist, or health care provider. Copyright© 2017 [employer]

## 2019-02-20 NOTE — PROGRESS NOTES
"Outpatient Psychiatry Follow-Up Visit (MD/NP)    2/20/2019    Clinical Status of Patient:  Outpatient (Ambulatory)    Chief Complaint:  Bernarda Ly is a 23 y.o. female who presented today for follow-up of anxiety and attention problems.  Patient's chart was reviewed. Met with patient. 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 She was diagnosed with ADHD at age 3 and has been taking stimulant for many years. She tried concerta, ritalin, vyvanse and adderall.    Interval History and Content of Current Session:  Interim Events/Subjective Report/Content of Current Session:  She arrived early to her scheduled appointment and presented as calm and pleasant. She described her mood as "tired because I just got off of work and worked the night shift". Patient's affect was euthymic and neutral. She stated she tried vyvanse but it was not effective in treating her ADHD symptoms. She reported that her ADHD symptoms are better controlled with Adderall. She is currently taking Adderall XR 30 mg po daily and Adderall IR 10 mg po PRN daily. She denied feeling depressed but reported "some manageable anxiety at times". She reported medication compliance and denied any side effects or history of heart palpitations, syncope, dizziness, dyspnea on exertion, shortness of breath, GI upset, insomnia, seizure, ronn, or chest pain. Her blood pressure is within normal limits this AM. She stated that sometimes she feels a little irritable and tired at the end of her work shift but it is relieved by sleeping and rest. She denied SI/HI/VH/AH. Denied alcohol or drugs use. She reported adequate sleep and appetite. Checked  and no signs of misuse.     Psychotherapy:  · Target symptoms: lack of focus, anxiety   · Why chosen therapy is appropriate versus another modality: evidence based practice  · Outcome monitoring methods: self-report  · Therapeutic intervention type: supportive " "psychotherapy  · Topics discussed/themes: building skills sets for symptom management, symptom recognition  · The patient's response to the intervention is motivated. The patient's progress toward treatment goals is good.   · Duration of intervention:15 minutes.    Review of Systems   · PSYCHIATRIC: Pertinant items are noted in the narrative.  · CONSTITUTIONAL: No weight gain or loss.   · MUSCULOSKELETAL: No pain or stiffness of the joints.  · NEUROLOGIC: No weakness, sensory changes, seizures, confusion, memory loss, tremor or other abnormal movements.  · ENDOCRINE: No polydipsia or polyuria.  · INTEGUMENTARY: No rashes or lacerations.  · EYES: No exophthalmos, jaundice or blindness.  · ENT: No dizziness, tinnitus or hearing loss.  · RESPIRATORY: No shortness of breath.  · CARDIOVASCULAR: No tachycardia or chest pain.  · GASTROINTESTINAL: No nausea, vomiting, pain, constipation or diarrhea.  · GENITOURINARY: No frequency, dysuria or sexual dysfunction.  · HEMATOLOGIC/LYMPHATIC: No excessive bleeding, prolonged or excessive bleeding after dental extraction/injury.  · ALLERGIC/IMMUNOLOGIC: No allergic response to materials, foods or animals at this time.    Past Medical, Family and Social History: The patient's past medical, family and social history have been reviewed and updated as appropriate within the electronic medical record - see encounter notes.    Compliance: yes    Side effects: irritability    Risk Parameters:  Patient reports no suicidal ideation  Patient reports no homicidal ideation  Patient reports no self-injurious behavior  Patient reports no violent behavior    Exam (detailed: at least 9 elements; comprehensive: all 15 elements)   Constitutional  Vitals:  Most recent vital signs, dated greater than 90 days prior to this appointment, were reviewed.   Vitals:    02/20/19 0831   BP: 128/70   Pulse: 86   Weight: 90.2 kg (198 lb 13.7 oz)   Height: 5' 3" (1.6 m)        General:  unremarkable, age " "appropriate     Musculoskeletal  Muscle Strength/Tone:  no dystonia, no tremor, no tic   Gait & Station:  non-ataxic     Psychiatric  Speech:  no latency; no press   Mood & Affect:  "good"  euthymic and neutral   Thought Process:  normal and logical   Associations:  intact   Thought Content:  normal, no suicidality, no homicidality, delusions, or paranoia   Insight:  intact   Judgement: behavior is adequate to circumstances   Orientation:  grossly intact   Memory: intact for content of interview   Language: grossly intact   Attention Span & Concentration:  able to focus   Fund of Knowledge:  intact and appropriate to age and level of education     Assessment and Diagnosis   Status/Progress: Based on the examination today, the patient's problem(s) is/are improved.  New problems have not been presented today.   Co-morbidities are not complicating management of the primary condition.  There are no active rule-out diagnoses for this patient at this time.     General Impression: Patient has prior diagnosis of attention deficit disorder combined type and has anxiety symptoms that meets the criteria for anxiety disorder unspecified. Her ADHD symptoms are controlled with Adderall.       ICD-10-CM ICD-9-CM   1. Anxiety F41.9 300.00   2. ADHD (attention deficit hyperactivity disorder), combined type F90.2 314.01   3. Shifting sleep-work schedule, affecting sleep G47.26 327.36       Intervention/Counseling/Treatment Plan   ? Medication Management: Continue current medications. The risks and benefits of medication were discussed with the patient.   ? Discontinue Vyvanse 30mg qd  ? Continue PRN Adderall IR 10mg -2 E-scripts postdated   ? Continue Adderall XR 30 mg po daily -2 E-scripts posted   ? Discontinue PRN vistaril 15mg bid prn for anxiety or insomnia  ? Continue Melatonin 5mg PRN for shift work sleep problems  · Labs: reviewed most recent  · The treatment plan and follow up plan were reviewed with the " patient.  · Discussed with patient informed consent, risks vs. benefits, alternative treatments, side effect profile and the inherent unpredictability of individual responses to these treatments. The patient expresses understanding of the above and displays the capacity to agree with this current plan and had no other questions.  · Encouraged Patient to keep future appointments.   · Take medications as prescribed and abstain from substance abuse.   · In the event of an emergency patient was advised to go to the emergency room.        Return to Clinic: 3 months

## 2019-04-08 ENCOUNTER — INITIAL CONSULT (OUTPATIENT)
Dept: OPHTHALMOLOGY | Facility: CLINIC | Age: 24
End: 2019-04-08
Payer: COMMERCIAL

## 2019-04-08 DIAGNOSIS — H18.603 KERATOCONUS OF BOTH EYES: Primary | ICD-10-CM

## 2019-04-08 DIAGNOSIS — H52.213 IRREGULAR ASTIGMATISM OF BOTH EYES: ICD-10-CM

## 2019-04-08 PROCEDURE — 92025 CPTRIZED CORNEAL TOPOGRAPHY: CPT | Mod: S$GLB,,, | Performed by: OPHTHALMOLOGY

## 2019-04-08 PROCEDURE — 99999 PR PBB SHADOW E&M-EST. PATIENT-LVL II: ICD-10-PCS | Mod: PBBFAC,,, | Performed by: OPHTHALMOLOGY

## 2019-04-08 PROCEDURE — 92025 COMPUTERIZED CORNEAL TOPOGRAPHY: ICD-10-PCS | Mod: S$GLB,,, | Performed by: OPHTHALMOLOGY

## 2019-04-08 PROCEDURE — 92004 PR EYE EXAM, NEW PATIENT,COMPREHESV: ICD-10-PCS | Mod: S$GLB,,, | Performed by: OPHTHALMOLOGY

## 2019-04-08 PROCEDURE — 92004 COMPRE OPH EXAM NEW PT 1/>: CPT | Mod: S$GLB,,, | Performed by: OPHTHALMOLOGY

## 2019-04-08 PROCEDURE — 99999 PR PBB SHADOW E&M-EST. PATIENT-LVL II: CPT | Mod: PBBFAC,,, | Performed by: OPHTHALMOLOGY

## 2019-04-08 RX ORDER — DIAZEPAM 2 MG/1
2 TABLET ORAL ONCE AS NEEDED
Qty: 3 TABLET | Refills: 0 | Status: SHIPPED | OUTPATIENT
Start: 2019-04-08 | End: 2019-04-08 | Stop reason: SDUPTHER

## 2019-04-08 RX ORDER — OFLOXACIN 3 MG/ML
1 SOLUTION/ DROPS OPHTHALMIC 3 TIMES DAILY
Qty: 5 ML | Refills: 0 | Status: SHIPPED | OUTPATIENT
Start: 2019-04-08 | End: 2019-04-18

## 2019-04-08 RX ORDER — PREDNISOLONE ACETATE 10 MG/ML
1 SUSPENSION/ DROPS OPHTHALMIC 3 TIMES DAILY
Qty: 5 ML | Refills: 1 | Status: SHIPPED | OUTPATIENT
Start: 2019-04-08 | End: 2019-05-08

## 2019-04-08 RX ORDER — DIAZEPAM 2 MG/1
2 TABLET ORAL ONCE AS NEEDED
Qty: 3 TABLET | Refills: 0 | Status: SHIPPED | OUTPATIENT
Start: 2019-04-08 | End: 2021-02-19 | Stop reason: SDUPTHER

## 2019-04-08 NOTE — PROGRESS NOTES
HPI     Referred by Dr Feldman    KCN OU- dx'ed agge 23    Beprive BID OU  avenova BID OU  Warm compresses OU   AT's PRN OU     Pt here for keratoconus eval per Dr Feldman.  Pt was diagnosed about 7   months ago.  Pt states she was told by Dr Feldman that she had progression   OD. Pt states occasional itching and blur and glare without eyeglasses. Pt   states irritation OU and the feeling of wanting to rub the eyes.     Last edited by Shagufta Amaro MD on 4/8/2019 11:13 AM. (History)            Assessment /Plan     For exam results, see Encounter Report.    Keratoconus of both eyes  -     Computerized corneal topography    Irregular astigmatism of both eyes  -     Computerized corneal topography    Other orders  -     prednisoLONE acetate (PRED FORTE) 1 % DrpS; Place 1 drop into the right eye 3 (three) times daily.  Dispense: 5 mL; Refill: 1  -     ofloxacin (OCUFLOX) 0.3 % ophthalmic solution; Place 1 drop into the right eye 3 (three) times daily. for 10 days  Dispense: 5 mL; Refill: 0  -     Discontinue: diazePAM (VALIUM) 2 MG tablet; Take 1 tablet (2 mg total) by mouth once as needed for Anxiety. PLEASE BRING PILLS WITH YOU TO THE [facility] ON THE DAY OF YOUR PROCEDURE TO BE TAKEN PRIOR TO TREATMENT.  Dispense: 3 tablet; Refill: 0  -     diazePAM (VALIUM) 2 MG tablet; Take 1 tablet (2 mg total) by mouth once as needed for Anxiety. PLEASE BRING PILLS WITH YOU TO THE [facility]  Dispense: 3 tablet; Refill: 0        KCN OU- dx'ed age 23    With evidence of progression OD    KERATOCONUS      The diagnosis of corneal ectasia and its etiology and clinical course were discussed. Treatment with the use of specialty contact lenses, collagen cross linking, and corneal transplantation was explained in detail.      This patient would be a suitable candidate for Collagen Cross linking (CXL) -- the goal of treatment is to strengthen corneal bonds and prevent further progression / ectasia.  VA improvement - BCVA and VAsc  - not guaranteed, however is seen in some cases. Most likely would recommend speciality CL fitting post treatment for BCVA.     This patient exhibits signs of progression of keratoconus and failure of conservative treatments with spectacles and/or contact lenses.      Disease progression is indicated by:  - An increase of >1D in the Kmax:   - An increase of >1D of astigmatism in the MRx  - An increase in myopia of >0.50D on MRx     I recommend treatment with Collagen Crosslinking using the Avedro FDA approved epi-off protocol with Photrexa and the KXL System, in order to stabilize this condition.     Plan:   CXL treatment OD, then OS     I have informed the patient that we will seek insurance pre-approval, but in case of failure of the insurance company to cover the cost of this medically necessary procedure, there may be a cost of $2,000 for the patient.

## 2019-04-08 NOTE — LETTER
[facility]  [address]  [facility] [address]  Phone: [phone]  Fax: [phone]   April 10, 2019    Natalie Feldman MD  [address] [facility] [address]    Patient: Bernarda Ly   MR Number: [id number]   YOB: 1995   Date of Visit: 4/8/2019       Dear Dr. Feldman:    Thank you for referring Bernarda Ly to me for evaluation. Here is my assessment and plan of care:          KCN OU- dx'ed age 23- with evidence of progression OD     The diagnosis of corneal ectasia and its etiology and clinical course were discussed. Treatment with the use of specialty contact lenses, collagen cross linking, and corneal transplantation was explained in detail.      This patient would be a suitable candidate for Collagen Cross linking (CXL) -- the goal of treatment is to strengthen corneal bonds and prevent further progression / ectasia.  VA improvement - BCVA and VAsc - not guaranteed, however is seen in some cases. Most likely would recommend speciality CL fitting post treatment for BCVA.     This patient exhibits signs of progression of keratoconus and failure of conservative treatments with spectacles and/or contact lenses.      Disease progression is indicated by:  - An increase of >1D in the Kmax:   - An increase of >1D of astigmatism in the MRx  - An increase in myopia of >0.50D on MRx     I recommend treatment with Collagen Crosslinking using the Avedro FDA approved epi-off protocol with Photrexa and the KXL System, in order to stabilize this condition.     Plan:   CXL treatment OD, then OS                Below you will find my full exam findings. If you have questions, please do not hesitate to call me. I look forward to following Ms. Bernarda Ly along with you.    Sincerely,        Shagufta Amaro MD       CC  No Recipients             Base Eye Exam     Visual Acuity (Snellen - Linear)       Right Left    Dist cc 20/25 20/20    Correction:  Glasses          Tonometry  (Tonopen, 11:04 AM)       Right Left    Pressure 16 22          Pachymetry (4/8/2019)       Right Left    Thickness 502 496          Pupils       Pupils APD    Right PERRL None    Left PERRL None          Neuro/Psych     Oriented x3:  Yes    Mood/Affect:  Normal            Slit Lamp and Fundus Exam     Slit Lamp Exam       Right Left    Lids/Lashes Normal Normal    Conjunctiva/Sclera White and quiet White and quiet    Cornea Clear Clear    Anterior Chamber Deep and quiet Deep and quiet    Iris Round and reactive Round and reactive    Lens Clear Clear    Vitreous Normal Normal          Fundus Exam       Right Left    Disc Normal Normal            Refraction     Wearing Rx       Sphere Cylinder Axis    Right -0.50 +0.75 174    Left -0.75 +1.00 179    Type:  SVL

## 2019-04-08 NOTE — Clinical Note
April 10, 2019      Natalie Feldman MD  [address] [facility] [address]           [facility] - Ophthalmology  [address]  [place] [address]  Phone: [phone]  Fax: [phone]          Patient: Bernarda Ly   MR Number: [id number]   YOB: 1995   Date of Visit: 4/8/2019       Dear Dr. Natalie Feldman:    Thank you for referring Bernarda Ly to me for evaluation. Attached you will find relevant portions of my assessment and plan of care.    If you have questions, please do not hesitate to call me. I look forward to following Bernarda Ly along with you.    Sincerely,    Shagufta Amaro MD    Enclosure  CC:  No Recipients    If you would like to receive this communication electronically, please contact [email or web address] or [phone] to request more information on [employer] Link access.    For providers and/or their staff who would like to refer a patient to [facility], please contact us through our one-stop-shop provider referral line, [facility], at [phone].    If you feel you have received this communication in error or would no longer like to receive these types of communications, please e-mail [email or web address]

## 2019-04-10 ENCOUNTER — TELEPHONE (OUTPATIENT)
Dept: OPHTHALMOLOGY | Facility: CLINIC | Age: 24
End: 2019-04-10

## 2019-04-11 ENCOUNTER — TELEPHONE (OUTPATIENT)
Dept: OPHTHALMOLOGY | Facility: CLINIC | Age: 24
End: 2019-04-11

## 2019-04-11 NOTE — TELEPHONE ENCOUNTER
Returned pt call and advised that Jayden was out for the day and she would return her call when she returned to the office.

## 2019-04-11 NOTE — TELEPHONE ENCOUNTER
----- Message from Kortney Olivares sent at 4/11/2019 12:00 PM CDT -----  Contact: Bernarda  Patient Returning Call from [facility]    Who Left Message for Patient: Darline  Communication Preference: Call back [phone]  Additional Information: Pt returning call to schedule for crosslinking procedure.

## 2019-05-08 ENCOUNTER — TELEPHONE (OUTPATIENT)
Dept: OPHTHALMOLOGY | Facility: CLINIC | Age: 24
End: 2019-05-08

## 2019-05-08 DIAGNOSIS — H18.603 KERATOCONUS OF BOTH EYES: Primary | ICD-10-CM

## 2019-05-08 NOTE — TELEPHONE ENCOUNTER
Spoke with patient. Pt was concerned about her PA for CXL sx. She stated that the PA doesn't fall within the time frame of sx. Will consult with insurance. ARMAND

## 2019-05-08 NOTE — TELEPHONE ENCOUNTER
----- Message from Kelsey Ortega sent at 5/8/2019 10:06 AM CDT -----  Contact: Bernarda  Needs Advice    Reason for call: pt stated she needed to speak with someone in the office.         Communication Preference: [phone]    Additional Information:

## 2019-05-08 NOTE — TELEPHONE ENCOUNTER
Pt requesting another PA for CXL for KCN in case 1st one expires before her procedure.  Will place today.

## 2019-05-09 ENCOUNTER — TELEPHONE (OUTPATIENT)
Dept: OPHTHALMOLOGY | Facility: CLINIC | Age: 24
End: 2019-05-09

## 2019-05-09 NOTE — TELEPHONE ENCOUNTER
Pt called and stated that her referral will be out of date by the time her procedure is. Had Dr. Amaro put in a new referral and I canceled previous one. Scheduled new referral with procedure. SDF

## 2019-05-20 ENCOUNTER — TELEPHONE (OUTPATIENT)
Dept: OPHTHALMOLOGY | Facility: CLINIC | Age: 24
End: 2019-05-20

## 2019-05-22 ENCOUNTER — TELEPHONE (OUTPATIENT)
Dept: OPHTHALMOLOGY | Facility: CLINIC | Age: 24
End: 2019-05-22

## 2019-05-22 NOTE — TELEPHONE ENCOUNTER
----- Message from Anabelle Hunter MA sent at 2019  3:44 PM CDT -----  Contact: Ina FERNANDES      ----- Message -----  From: Pankaj Rodriguez  Sent: 2019   3:18 PM  To: Sondra DEMARCO Staff    Ina needs you to contact to get a new authorization because the one on file  on 19 for laser procedure. You can contact [facility] at [phone] to update the date of service.

## 2019-05-22 NOTE — TELEPHONE ENCOUNTER
Called Ina at [facility] in regards to PA. Patient currently has new PA pending

## 2019-05-29 ENCOUNTER — OFFICE VISIT (OUTPATIENT)
Dept: PSYCHIATRY | Facility: CLINIC | Age: 24
End: 2019-05-29
Payer: COMMERCIAL

## 2019-05-29 ENCOUNTER — TELEPHONE (OUTPATIENT)
Dept: OPHTHALMOLOGY | Facility: CLINIC | Age: 24
End: 2019-05-29

## 2019-05-29 VITALS
WEIGHT: 204.81 LBS | SYSTOLIC BLOOD PRESSURE: 121 MMHG | BODY MASS INDEX: 36.28 KG/M2 | HEART RATE: 94 BPM | DIASTOLIC BLOOD PRESSURE: 67 MMHG

## 2019-05-29 DIAGNOSIS — F90.2 ADHD (ATTENTION DEFICIT HYPERACTIVITY DISORDER), COMBINED TYPE: Primary | ICD-10-CM

## 2019-05-29 DIAGNOSIS — F41.9 ANXIETY DISORDER, UNSPECIFIED TYPE: ICD-10-CM

## 2019-05-29 PROCEDURE — 99999 PR PBB SHADOW E&M-EST. PATIENT-LVL II: ICD-10-PCS | Mod: PBBFAC,,, | Performed by: NURSE PRACTITIONER

## 2019-05-29 PROCEDURE — 99213 PR OFFICE/OUTPT VISIT, EST, LEVL III, 20-29 MIN: ICD-10-PCS | Mod: S$GLB,,, | Performed by: NURSE PRACTITIONER

## 2019-05-29 PROCEDURE — 99999 PR PBB SHADOW E&M-EST. PATIENT-LVL II: CPT | Mod: PBBFAC,,, | Performed by: NURSE PRACTITIONER

## 2019-05-29 PROCEDURE — 99213 OFFICE O/P EST LOW 20 MIN: CPT | Mod: S$GLB,,, | Performed by: NURSE PRACTITIONER

## 2019-05-29 RX ORDER — DEXTROAMPHETAMINE SACCHARATE, AMPHETAMINE ASPARTATE, DEXTROAMPHETAMINE SULFATE AND AMPHETAMINE SULFATE 2.5; 2.5; 2.5; 2.5 MG/1; MG/1; MG/1; MG/1
10 TABLET ORAL DAILY PRN
Qty: 15 TABLET | Refills: 0 | Status: SHIPPED | OUTPATIENT
Start: 2019-05-29 | End: 2019-07-11

## 2019-05-29 RX ORDER — DEXTROAMPHETAMINE SACCHARATE, AMPHETAMINE ASPARTATE MONOHYDRATE, DEXTROAMPHETAMINE SULFATE AND AMPHETAMINE SULFATE 7.5; 7.5; 7.5; 7.5 MG/1; MG/1; MG/1; MG/1
30 CAPSULE, EXTENDED RELEASE ORAL EVERY MORNING
Qty: 30 CAPSULE | Refills: 0 | Status: SHIPPED | OUTPATIENT
Start: 2019-05-29 | End: 2019-07-11

## 2019-05-29 RX ORDER — DEXTROAMPHETAMINE SACCHARATE, AMPHETAMINE ASPARTATE MONOHYDRATE, DEXTROAMPHETAMINE SULFATE AND AMPHETAMINE SULFATE 7.5; 7.5; 7.5; 7.5 MG/1; MG/1; MG/1; MG/1
30 CAPSULE, EXTENDED RELEASE ORAL EVERY MORNING
Qty: 30 CAPSULE | Refills: 0 | Status: SHIPPED | OUTPATIENT
Start: 2019-07-11 | End: 2019-08-25

## 2019-05-29 RX ORDER — DEXTROAMPHETAMINE SACCHARATE, AMPHETAMINE ASPARTATE, DEXTROAMPHETAMINE SULFATE AND AMPHETAMINE SULFATE 2.5; 2.5; 2.5; 2.5 MG/1; MG/1; MG/1; MG/1
10 TABLET ORAL DAILY PRN
Qty: 15 TABLET | Refills: 0 | Status: SHIPPED | OUTPATIENT
Start: 2019-07-11 | End: 2019-08-25

## 2019-05-29 NOTE — TELEPHONE ENCOUNTER
Spoke with patient to make sure she knew where to go tomorrow for her procedure. Also, asked pt to come in for 10:30 instead of 12:30. SDF

## 2019-05-29 NOTE — PROGRESS NOTES
"Outpatient Psychiatry Follow-Up Visit (MD/NP)    5/29/2019    Clinical Status of Patient:  Outpatient (Ambulatory)    Chief Complaint:  Bernarda Ly is a 24 y.o. female who presented today for follow-up of anxiety and attention problems.  Patient's chart was reviewed. Met with patient. 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 She was diagnosed with ADHD at age 3 and has been taking stimulant for many years. She tried concerta, ritalin, vyvanse and adderall. She tried vyvanse but it was not effective in treating her ADHD symptoms.    Interval History and Content of Current Session:  Interim Events/Subjective Report/Content of Current Session:    She described her mood as "nervous because I have an eye surgery tomorrow".  Patient's affect was slightly anxious and mood congruent. She stated that her anxiety "comes and goes" but manageable. She reported that her ADHD symptoms are controlled with adderall XR 30 mg po daily and adderall IR 10 mg po PRN daily. She reported medication compliance and denied any side effects or history of heart palpitations, syncope, dizziness, dyspnea on exertion, shortness of breath, GI upset, insomnia, seizure, ronn, or chest pain. She denied SI/HI/VH/AH. Denied alcohol or drugs use. She reported adequate sleep and appetite. Checked  and no signs of misuse.     Psychotherapy:  · Target symptoms: lack of focus, anxiety   · Why chosen therapy is appropriate versus another modality: evidence based practice  · Outcome monitoring methods: self-report  · Therapeutic intervention type: supportive psychotherapy  · Topics discussed/themes: building skills sets for symptom management, symptom recognition  · The patient's response to the intervention is motivated. The patient's progress toward treatment goals is good.   · Duration of intervention:15 minutes.    Review of Systems   · PSYCHIATRIC: Pertinant items are noted in the " "narrative.  · CONSTITUTIONAL: No weight gain or loss.   · MUSCULOSKELETAL: No pain or stiffness of the joints.  · NEUROLOGIC: No weakness, sensory changes, seizures, confusion, memory loss, tremor or other abnormal movements.  · ENDOCRINE: No polydipsia or polyuria.  · INTEGUMENTARY: No rashes or lacerations.  · EYES: No exophthalmos, jaundice or blindness.  · ENT: No dizziness, tinnitus or hearing loss.  · RESPIRATORY: No shortness of breath.  · CARDIOVASCULAR: No tachycardia or chest pain.  · GASTROINTESTINAL: No nausea, vomiting, pain, constipation or diarrhea.  · GENITOURINARY: No frequency, dysuria or sexual dysfunction.  · HEMATOLOGIC/LYMPHATIC: No excessive bleeding, prolonged or excessive bleeding after dental extraction/injury.  · ALLERGIC/IMMUNOLOGIC: No allergic response to materials, foods or animals at this time.    Past Medical, Family and Social History: The patient's past medical, family and social history have been reviewed and updated as appropriate within the electronic medical record - see encounter notes.    Compliance: yes    Side effects: irritability    Risk Parameters:  Patient reports no suicidal ideation  Patient reports no homicidal ideation  Patient reports no self-injurious behavior  Patient reports no violent behavior    Exam (detailed: at least 9 elements; comprehensive: all 15 elements)   Constitutional  Vitals:  Most recent vital signs, dated greater than 90 days prior to this appointment, were reviewed.   Vitals:    05/29/19 0838   BP: 121/67   Pulse: 94   Weight: 92.9 kg (204 lb 12.9 oz)        General:  unremarkable, age appropriate     Musculoskeletal  Muscle Strength/Tone:  no dystonia, no tremor, no tic   Gait & Station:  non-ataxic     Psychiatric  Speech:  no latency; no press   Mood & Affect:  "nervous because I have an eye surgery tomorrow".  euthymic and neutral   Thought Process:  slightly anxious but mood congruent   Associations:  intact   Thought Content:  normal, no " suicidality, no homicidality, delusions, or paranoia   Insight:  intact   Judgement: behavior is adequate to circumstances   Orientation:  grossly intact   Memory: intact for content of interview   Language: grossly intact   Attention Span & Concentration:  able to focus   Fund of Knowledge:  intact and appropriate to age and level of education     Assessment and Diagnosis   Status/Progress: Based on the examination today, the patient's problem(s) is/are improved.  New problems have not been presented today.   Co-morbidities are not complicating management of the primary condition.  There are no active rule-out diagnoses for this patient at this time.     General Impression: Patient has prior diagnosis of attention deficit disorder combined type and has anxiety symptoms that meets the criteria for anxiety disorder unspecified. Her ADHD symptoms are controlled with Adderall.       ICD-10-CM ICD-9-CM   1. ADHD (attention deficit hyperactivity disorder), combined type F90.2 314.01   2. Anxiety disorder, unspecified type F41.9 300.00       Intervention/Counseling/Treatment Plan   ? Medication Management: Continue current medications. The risks and benefits of medication were discussed with the patient.   ? Discontinue Vyvanse 30mg qd  ? Continue PRN Adderall IR 10mg -2 E-scripts postdated   ? Continue Adderall XR 30 mg po daily -2 E-scripts posted   ? Discontinue PRN vistaril 15mg bid prn for anxiety or insomnia  ? Continue Melatonin 5mg PRN for shift work sleep problems  · Labs: reviewed most recent  · The treatment plan and follow up plan were reviewed with the patient.  · Discussed with patient informed consent, risks vs. benefits, alternative treatments, side effect profile and the inherent unpredictability of individual responses to these treatments. The patient expresses understanding of the above and displays the capacity to agree with this current plan and had no other questions.  · Encouraged Patient to keep  future appointments.   · Take medications as prescribed and abstain from substance abuse.   · In the event of an emergency patient was advised to go to the emergency room.        Return to Clinic: 3 months

## 2019-05-29 NOTE — PATIENT INSTRUCTIONS
Amphetamine; Dextroamphetamine extended-release capsules  What is this medicine?  AMPHETAMINE; DEXTROAMPHETAMINE (am FET a meen; dex troe am FET a meen) is used to treat attention-deficit hyperactivity disorder (ADHD). Federal law prohibits giving this medicine to any person other than the person for whom it was prescribed. Do not share this medicine with anyone else.  How should I use this medicine?  Take this medicine by mouth with a glass of water. Follow the directions on the prescription label. This medicine is taken just one time per day, usually in the morning after waking up. Take with or without food. Do not chew or crush this medicine. You may open the capsules and sprinkle the medicine on a spoonful of applesauce. If sprinkled on applesauce, take the dose immediately and do not crush or chew. Always drink a glass of water or other liquid after taking this medicine. Do not take your medicine more often than directed.  A special MedGuide will be given to you by the pharmacist with each prescription and refill. Be sure to read this information carefully each time.  Talk to your pediatrician regarding the use of this medicine in children. While this drug may be prescribed for children as young as 6 years for selected conditions, precautions do apply.  What side effects may I notice from receiving this medicine?  Side effects that you should report to your doctor or health care professional as soon as possible:  · allergic reactions like skin rash, itching or hives, swelling of the face, lips, or tongue  · changes in vision  · chest pain or chest tightness  · confusion, trouble speaking or understanding  · fast, irregular heartbeat  · fingers or toes feel numb, cool, painful  · hallucination, loss of contact with reality  · high blood pressure  · males: prolonged or painful erection  · seizures  · severe headaches  · shortness of breath  · suicidal thoughts or other mood changes  · trouble walking,  dizziness, loss of balance or coordination  · uncontrollable head, mouth, neck, arm, or leg movements  Side effects that usually do not require medical attention (report to your doctor or health care professional if they continue or are bothersome):  · anxious  · headache  · loss of appetite  · nausea, vomiting  · trouble sleeping  · weight loss  What may interact with this medicine?  Do not take this medicine with any of the following medications:  · MAOIs like Carbex, Eldepryl, Marplan, Nardil, and Parnate  · other stimulant medicines for attention disorders, weight loss, or to stay awake  This medicine may also interact with the following medications:  · acetazolamide  · ammonium chloride  · antacids  · ascorbic acid  · atomoxetine  · caffeine  · certain medicines for blood pressure  · certain medicines for depression, anxiety, or psychotic disturbances  · certain medicines for diabetes  · certain medicines for seizures like carbamazepine, phenobarbital, phenytoin  · certain medicines for stomach problems like cimetidine, famotidine, omeprazole, lansoprazole  · cold or allergy medicines  · glutamic acid  · lithium  · meperidine  · methenamine; sodium acid phosphate  · narcotic medicines for pain  · norepinephrine  · phenothiazines like chlorpromazine, mesoridazine, prochlorperazine, thioridazine  · sodium bicarbonate  What if I miss a dose?  If you miss a dose, take it as soon as you can. If it is almost time for your next dose, take only that dose. Do not take double or extra doses.  Where should I keep my medicine?  Keep out of the reach of children. This medicine can be abused. Keep your medicine in a safe place to protect it from theft. Do not share this medicine with anyone. Selling or giving away this medicine is dangerous and against the law.  Store at room temperature between 15 and 30 degrees C (59 and 86 degrees F). Keep container tightly closed. Protect from light. Throw away any unused medicine after  the expiration date.  What should I tell my health care provider before I take this medicine?  They need to know if you have any of these conditions:  · anxiety or panic attacks  · circulation problems in fingers and toes  · glaucoma  · hardening or blockages of the arteries or heart blood vessels  · heart disease or a heart defect  · high blood pressure  · history of a drug or alcohol abuse problem  · history of stroke  · kidney disease  · liver disease  · mental illness  · seizures  · suicidal thoughts, plans, or attempt; a previous suicide attempt by you or a family member  · thyroid disease  · Tourette's syndrome  · an unusual or allergic reaction to dextroamphetamine, other amphetamines, other medicines, foods, dyes, or preservatives  · pregnant or trying to get pregnant  · breast-feeding  What should I watch for while using this medicine?  Visit your doctor or health care professional for regular checks on your progress. This prescription requires that you follow special procedures with your doctor and pharmacy. You will need to have a new written prescription from your doctor every time you need a refill.  This medicine may affect your concentration, or hide signs of tiredness. Until you know how this medicine affects you, do not drive, ride a bicycle, use machinery, or do anything that needs mental alertness.  Tell your doctor or health care professional if this medicine loses its effects, or if you feel you need to take more than the prescribed amount. Do not change the dosage without talking to your doctor or health care professional.  Decreased appetite is a common side effect when starting this medicine. Eating small, frequent meals or snacks can help. Talk to your doctor if you continue to have poor eating habits. Height and weight growth of a child taking this medicine will be monitored closely.  Do not take this medicine close to bedtime. It may prevent you from sleeping.  If you are going to need  surgery, an MRI, a CT scan, or other procedure, tell your doctor that you are taking this medicine. You may need to stop taking this medicine before the procedure.  Tell your doctor or healthcare professional right away if you notice unexplained wounds on your fingers and toes while taking this medicine. You should also tell your healthcare provider if you experience numbness or pain, changes in the skin color, or sensitivity to temperature in your fingers or toes.  NOTE:This sheet is a summary. It may not cover all possible information. If you have questions about this medicine, talk to your doctor, pharmacist, or health care provider. Copyright© 2017 [employer]        Amphetamine; Dextroamphetamine tablets  What is this medicine?  AMPHETAMINE; DEXTROAMPHETAMINE(am FET a meen; dex troe am FET a meen) is used to treat attention-deficit hyperactivity disorder (ADHD). It may also be used for narcolepsy. Federal law prohibits giving this medicine to any person other than the person for whom it was prescribed. Do not share this medicine with anyone else.  How should I use this medicine?  Take this medicine by mouth with a glass of water. Follow the directions on the prescription label. Take your doses at regular intervals. Do not take your medicine more often than directed. Do not suddenly stop your medicine. You must gradually reduce the dose or you may feel withdrawal effects. Ask your doctor or health care professional for advice.  Talk to your pediatrician regarding the use of this medicine in children. Special care may be needed. While this drug may be prescribed for children as young as 3 years for selected conditions, precautions do apply.  What side effects may I notice from receiving this medicine?  Side effects that you should report to your doctor or health care professional as soon as possible:  · allergic reactions like skin rash, itching or hives, swelling of the face, lips, or tongue  · changes in  vision  · chest pain or chest tightness  · confusion, trouble speaking or understanding  · fast, irregular heartbeat  · fingers or toes feel numb, cool, painful  · hallucination, loss of contact with reality  · high blood pressure  · males: prolonged or painful erection  · seizures  · severe headaches  · shortness of breath  · suicidal thoughts or other mood changes  · trouble walking, dizziness, loss of balance or coordination  · uncontrollable head, mouth, neck, arm, or leg movements  Side effects that usually do not require medical attention (report to your doctor or health care professional if they continue or are bothersome):  · anxious  · headache  · loss of appetite  · nausea, vomiting  · trouble sleeping  · weight loss  What may interact with this medicine?  Do not take this medicine with any of the following medications:  · MAOIS like Carbex, Eldepryl, Marplan, Nardil, and Parnate  · other stimulant medicines for attention disorders, weight loss, or to stay awake  This medicine may also interact with the following medications:  · acetazolamide  · ammonium chloride  · antacids  · ascorbic acid  · atomoxetine  · caffeine  · certain medicines for blood pressure  · certain medicines for depression, anxiety, or psychotic disturbances  · certain medicines for seizures like carbamazepine, phenobarbital, phenytoin  · certain medicines for stomach problems like cimetidine, famotidine, omeprazole, lansoprazole  · cold or allergy medicines  · glutamic acid  · lithium  · meperidine  · methenamine; sodium acid phosphate  · narcotic medicines for pain  · norepinephrine  · phenothiazines like chlorpromazine, mesoridazine, prochlorperazine, thioridazine  · sodium acid phosphate  · sodium bicarbonate  What if I miss a dose?  If you miss a dose, take it as soon as you can. If it is almost time for your next dose, take only that dose. Do not take double or extra doses.  Where should I keep my medicine?  Keep out of the reach  of children. This medicine can be abused. Keep your medicine in a safe place to protect it from theft. Do not share this medicine with anyone. Selling or giving away this medicine is dangerous and against the law.  Store at room temperature between 15 and 30 degrees C (59 and 86 degrees F). Keep container tightly closed. Throw away any unused medicine after the expiration date. Dispose of properly. This medicine may cause accidental overdose and death if it is taken by other adults, children, or pets. Mix any unused medicine with a substance like cat litter or coffee grounds. Then throw the medicine away in a sealed container like a sealed bag or a coffee can with a lid. Do not use the medicine after the expiration date.  What should I tell my health care provider before I take this medicine?  They need to know if you have any of these conditions:  · anxiety or panic attacks  · circulation problems in fingers and toes  · glaucoma  · hardening or blockages of the arteries or heart blood vessels  · heart disease or a heart defect  · high blood pressure  · history of a drug or alcohol abuse problem  · history of stroke  · kidney disease  · liver disease  · mental illness  · seizures  · suicidal thoughts, plans, or attempt; a previous suicide attempt by you or a family member  · thyroid disease  · Tourette's syndrome  · an unusual or allergic reaction to dextroamphetamine, other amphetamines, other medicines, foods, dyes, or preservatives  · pregnant or trying to get pregnant  · breast-feeding  What should I watch for while using this medicine?  Visit your doctor or health care professional for regular checks on your progress. This prescription requires that you follow special procedures with your doctor and pharmacy. You will need to have a new written prescription from your doctor every time you need a refill.  This medicine may affect your concentration, or hide signs of tiredness. Until you know how this medicine  affects you, do not drive, ride a bicycle, use machinery, or do anything that needs mental alertness.  Tell your doctor or health care professional if this medicine loses its effects, or if you feel you need to take more than the prescribed amount. Do not change the dosage without talking to your doctor or health care professional.  Decreased appetite is a common side effect when starting this medicine. Eating small, frequent meals or snacks can help. Talk to your doctor if you continue to have poor eating habits. Height and weight growth of a child taking this medicine will be monitored closely.  Do not take this medicine close to bedtime. It may prevent you from sleeping.  If you are going to need surgery, a MRI, CT scan, or other procedure, tell your doctor that you are taking this medicine. You may need to stop taking this medicine before the procedure.  Tell your doctor or healthcare professional right away if you notice unexplained wounds on your fingers and toes while taking this medicine. You should also tell your healthcare provider if you experience numbness or pain, changes in the skin color, or sensitivity to temperature in your fingers or toes.  NOTE:This sheet is a summary. It may not cover all possible information. If you have questions about this medicine, talk to your doctor, pharmacist, or health care provider. Copyright© 2017 [employer]

## 2019-05-30 ENCOUNTER — CLINICAL SUPPORT (OUTPATIENT)
Dept: OPHTHALMOLOGY | Facility: CLINIC | Age: 24
End: 2019-05-30
Attending: OPHTHALMOLOGY
Payer: COMMERCIAL

## 2019-05-30 DIAGNOSIS — H18.603 KERATOCONUS OF BOTH EYES: ICD-10-CM

## 2019-05-30 PROCEDURE — 66999 PR LASIK/PRK 2 EYE: ICD-10-PCS | Mod: CSM,,, | Performed by: OPHTHALMOLOGY

## 2019-05-30 PROCEDURE — 99499 UNLISTED E&M SERVICE: CPT | Mod: S$GLB,,, | Performed by: OPHTHALMOLOGY

## 2019-05-30 PROCEDURE — 99499 NO LOS: ICD-10-PCS | Mod: S$GLB,,, | Performed by: OPHTHALMOLOGY

## 2019-05-30 PROCEDURE — 66999 UNLISTED PX ANT SEGMENT EYE: CPT | Mod: CSM,,, | Performed by: OPHTHALMOLOGY

## 2019-05-30 RX ORDER — OXYCODONE AND ACETAMINOPHEN 7.5; 325 MG/1; MG/1
1 TABLET ORAL EVERY 6 HOURS PRN
Qty: 10 TABLET | Refills: 0 | Status: SHIPPED | OUTPATIENT
Start: 2019-05-30 | End: 2019-07-26 | Stop reason: ALTCHOICE

## 2019-05-30 NOTE — PROGRESS NOTES
HPI     Referred by Dr Feldman     KCN OU- dx'ed age 23     Beprive BID OU   avenova BID OU   Warm compresses OU   AT's PRN OU     Here for CXL OD    Last edited by Shagufta Amaro MD on 5/30/2019 10:55 AM. (History)            Assessment /Plan     For exam results, see Encounter Report.    Keratoconus of both eyes  -     Collagen Cross-Linking    Other orders  -     oxyCODONE-acetaminophen (PERCOCET) 7.5-325 mg per tablet; Take 1 tablet by mouth every 6 (six) hours as needed for Pain.  Dispense: 10 tablet; Refill: 0      SURGEON: Shagufta Amaro MD     PREOPERATIVE DIAGNOSIS: Keratoconus     POSTOPERATIVE DIAGNOSIS: keratoconus     PROCEDURES: Collagen Crosslinking OD     ANESTHESIA: Topical Tetracaine 0.5%     COMPLICATIONS: None     ESTIMATED BLOOD LOSS: None     SPECIMENS: None     INDICATIONS:  The patient has a history a progressive corneal ectasia. During the initial consultation, a thorough discussion regarding of the risks, benefits, and alternatives to this procedure was undertaken. Risks were listed on the consent form and were reviewed with emphasis on the remote possibility of infection, inflammation, scarring, and progression of ectasia - all of which can potentially lead to loss of vision. Common side effects from the procedure, including pain, dry eye, glare, and haloes, were also explained, as well as defining realistic expectations of stabilizing the disease but not decreasing the need for glasses or contact lenses. The patient voices understanding of these risks and side effects and communicates realistic expectations from the procedure.      DESCRIPTION OF PROCEDURE:  The patient was admitted to the [facility] at [facility] where the operative eye and procedure were verified, vital signs were taken, and pre-operatively 5-10mg of oral diazepam and drops of Zymar and Pred Forte were administered. The patient was brought into the [facility] suite and positioned on the bed. A central 9mm  epithelial debridement was achieved with the [facility] [place] epithelial scrubber, and the initial riboflavin drops administered. A 30 minute induction with drops every 2 minutes was followed by pachymetry. When corneal pachy is less than 400um, hypotonic riboflavin drops are used to thicken the cornea to a minimum of 400um. Next, a 30 min UV light treatment, centered on the cornea was delivered. Antibiotics and a bandage contact lens were placed.  The patient was discharged with care instructions and a follow up appointment in the eye clinic.     VA OD next

## 2019-06-05 ENCOUNTER — OFFICE VISIT (OUTPATIENT)
Dept: OPHTHALMOLOGY | Facility: CLINIC | Age: 24
End: 2019-06-05
Payer: COMMERCIAL

## 2019-06-05 DIAGNOSIS — H52.213 IRREGULAR ASTIGMATISM OF BOTH EYES: ICD-10-CM

## 2019-06-05 DIAGNOSIS — H18.603 KERATOCONUS OF BOTH EYES: Primary | ICD-10-CM

## 2019-06-05 PROCEDURE — 99024 PR POST-OP FOLLOW-UP VISIT: ICD-10-PCS | Mod: S$GLB,,, | Performed by: OPHTHALMOLOGY

## 2019-06-05 PROCEDURE — 99999 PR PBB SHADOW E&M-EST. PATIENT-LVL III: ICD-10-PCS | Mod: PBBFAC,,, | Performed by: OPHTHALMOLOGY

## 2019-06-05 PROCEDURE — 99024 POSTOP FOLLOW-UP VISIT: CPT | Mod: S$GLB,,, | Performed by: OPHTHALMOLOGY

## 2019-06-05 PROCEDURE — 99999 PR PBB SHADOW E&M-EST. PATIENT-LVL III: CPT | Mod: PBBFAC,,, | Performed by: OPHTHALMOLOGY

## 2019-06-05 RX ORDER — BEPOTASTINE BESILATE 15 MG/ML
1 SOLUTION/ DROPS OPHTHALMIC 2 TIMES DAILY
Refills: 6 | COMMUNITY
Start: 2019-03-17 | End: 2020-03-13 | Stop reason: SDUPTHER

## 2019-06-05 RX ORDER — HYDROCODONE BITARTRATE AND ACETAMINOPHEN 5; 325 MG/1; MG/1
TABLET ORAL
COMMUNITY
End: 2023-04-04

## 2019-06-05 RX ORDER — ALBUTEROL SULFATE 90 UG/1
AEROSOL, METERED RESPIRATORY (INHALATION)
COMMUNITY

## 2019-06-05 RX ORDER — TRIPROLIDINE/PSEUDOEPHEDRINE 2.5MG-60MG
TABLET ORAL
Status: ON HOLD | COMMUNITY
End: 2023-06-30 | Stop reason: HOSPADM

## 2019-06-05 NOTE — PROGRESS NOTES
HPI     Referred by Dr Feldman     S/p CXL OD 5/30/19  KCN OU- dx'ed age 23     Ofloxacin OD qid  Pred OD qid  Beprive BID OU   Avenova BID OU   Warm compresses OS  AT's PRN OU     6 day post CXL OD. Patient is having headaches on and off on the RT side   of head. Patient states the pain has decreased OD. No other complaints.    Last edited by Melania Kevin on 6/5/2019  2:42 PM. (History)            Assessment /Plan     For exam results, see Encounter Report.    Keratoconus of both eyes    Irregular astigmatism of both eyes        S/p CXL OD 5/30/19  KCN OU- dx'ed age 23       Doing well, BCL removed.  D/c abx, decr PF to TID.  F/up 6 wks - va/IOP OD, can discuss CXL OS>

## 2019-07-26 ENCOUNTER — OFFICE VISIT (OUTPATIENT)
Dept: OPHTHALMOLOGY | Facility: CLINIC | Age: 24
End: 2019-07-26
Payer: COMMERCIAL

## 2019-07-26 DIAGNOSIS — H52.213 IRREGULAR ASTIGMATISM OF BOTH EYES: ICD-10-CM

## 2019-07-26 DIAGNOSIS — H18.603 KERATOCONUS OF BOTH EYES: Primary | ICD-10-CM

## 2019-07-26 PROCEDURE — 99999 PR PBB SHADOW E&M-EST. PATIENT-LVL II: CPT | Mod: PBBFAC,,, | Performed by: OPHTHALMOLOGY

## 2019-07-26 PROCEDURE — 92012 PR EYE EXAM, EST PATIENT,INTERMED: ICD-10-PCS | Mod: S$GLB,,, | Performed by: OPHTHALMOLOGY

## 2019-07-26 PROCEDURE — 99999 PR PBB SHADOW E&M-EST. PATIENT-LVL II: ICD-10-PCS | Mod: PBBFAC,,, | Performed by: OPHTHALMOLOGY

## 2019-07-26 PROCEDURE — 92012 INTRM OPH EXAM EST PATIENT: CPT | Mod: S$GLB,,, | Performed by: OPHTHALMOLOGY

## 2019-07-26 NOTE — PROGRESS NOTES
HPI     Referred by Dr Feldman     S/p CXL OD 5/30/19   KCN OU- dx'ed age 23     Ofloxacin OD qid   Pred OD TID   Beprive BID OU   Avenova BID Lids OU   Warm compresses OS   AT's PRN OU     2 mo post CXL OD. Patient is having headaches on and off on the RT side of   head. Patient states the pain has increased and glare OD. Pt states seeing   black line in peripheral vision toward left side of right eye      Last edited by Shagufta Amaro MD on 7/26/2019  8:26 AM. (History)            Assessment /Plan     For exam results, see Encounter Report.    Keratoconus of both eyes    Irregular astigmatism of both eyes    KCN OU- dx'ed age 23     S/p CXL OD 5/30/19    Doing well, may be having HA 2/2 steroid drops - start taper BID x 1 wk then qd x 1 wk then stop. Discussed punctal occlusion.    Reassure that VA disturbances (glare while watching tv, black line across Va) will subside.    CXL OS  - plan to schedule next if content with OD

## 2019-09-10 ENCOUNTER — OFFICE VISIT (OUTPATIENT)
Dept: PSYCHIATRY | Facility: CLINIC | Age: 24
End: 2019-09-10
Payer: COMMERCIAL

## 2019-09-10 VITALS
DIASTOLIC BLOOD PRESSURE: 68 MMHG | SYSTOLIC BLOOD PRESSURE: 122 MMHG | HEART RATE: 96 BPM | BODY MASS INDEX: 36.83 KG/M2 | WEIGHT: 207.88 LBS

## 2019-09-10 DIAGNOSIS — F41.9 ANXIETY DISORDER, UNSPECIFIED TYPE: ICD-10-CM

## 2019-09-10 DIAGNOSIS — F90.2 ADHD (ATTENTION DEFICIT HYPERACTIVITY DISORDER), COMBINED TYPE: Primary | ICD-10-CM

## 2019-09-10 DIAGNOSIS — G47.26 SHIFTING SLEEP-WORK SCHEDULE, AFFECTING SLEEP: ICD-10-CM

## 2019-09-10 PROCEDURE — 99999 PR PBB SHADOW E&M-EST. PATIENT-LVL II: CPT | Mod: PBBFAC,,, | Performed by: NURSE PRACTITIONER

## 2019-09-10 PROCEDURE — 99213 OFFICE O/P EST LOW 20 MIN: CPT | Mod: S$GLB,,, | Performed by: NURSE PRACTITIONER

## 2019-09-10 PROCEDURE — 99999 PR PBB SHADOW E&M-EST. PATIENT-LVL II: ICD-10-PCS | Mod: PBBFAC,,, | Performed by: NURSE PRACTITIONER

## 2019-09-10 PROCEDURE — 99213 PR OFFICE/OUTPT VISIT, EST, LEVL III, 20-29 MIN: ICD-10-PCS | Mod: S$GLB,,, | Performed by: NURSE PRACTITIONER

## 2019-09-10 RX ORDER — DEXTROAMPHETAMINE SACCHARATE, AMPHETAMINE ASPARTATE, DEXTROAMPHETAMINE SULFATE AND AMPHETAMINE SULFATE 2.5; 2.5; 2.5; 2.5 MG/1; MG/1; MG/1; MG/1
10 TABLET ORAL DAILY PRN
Qty: 15 TABLET | Refills: 0 | Status: SHIPPED | OUTPATIENT
Start: 2019-09-10 | End: 2020-10-05 | Stop reason: SDUPTHER

## 2019-09-10 RX ORDER — DEXTROAMPHETAMINE SACCHARATE, AMPHETAMINE ASPARTATE MONOHYDRATE, DEXTROAMPHETAMINE SULFATE AND AMPHETAMINE SULFATE 7.5; 7.5; 7.5; 7.5 MG/1; MG/1; MG/1; MG/1
30 CAPSULE, EXTENDED RELEASE ORAL EVERY MORNING
Qty: 30 CAPSULE | Refills: 0 | Status: SHIPPED | OUTPATIENT
Start: 2019-10-23 | End: 2020-10-05 | Stop reason: SDUPTHER

## 2019-09-10 RX ORDER — DEXTROAMPHETAMINE SACCHARATE, AMPHETAMINE ASPARTATE MONOHYDRATE, DEXTROAMPHETAMINE SULFATE AND AMPHETAMINE SULFATE 7.5; 7.5; 7.5; 7.5 MG/1; MG/1; MG/1; MG/1
30 CAPSULE, EXTENDED RELEASE ORAL EVERY MORNING
Qty: 30 CAPSULE | Refills: 0 | Status: SHIPPED | OUTPATIENT
Start: 2019-09-10 | End: 2020-10-05 | Stop reason: SDUPTHER

## 2019-09-10 RX ORDER — DEXTROAMPHETAMINE SACCHARATE, AMPHETAMINE ASPARTATE, DEXTROAMPHETAMINE SULFATE AND AMPHETAMINE SULFATE 2.5; 2.5; 2.5; 2.5 MG/1; MG/1; MG/1; MG/1
10 TABLET ORAL DAILY PRN
Qty: 15 TABLET | Refills: 0 | Status: SHIPPED | OUTPATIENT
Start: 2019-10-23 | End: 2021-02-22 | Stop reason: SDUPTHER

## 2019-09-10 NOTE — PROGRESS NOTES
"Outpatient Psychiatry Follow-Up Visit (MD/NP)    9/10/2019    Clinical Status of Patient:  Outpatient (Ambulatory)    Chief Complaint:  Bernarda Ly is a 24 y.o. female who presented today for follow-up of anxiety and attention problems.  Patient's chart was reviewed. Met with patient. 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 She was diagnosed with ADHD at age 3 and has been taking stimulant for many years. She tried concerta, ritalin, vyvanse and adderall. She tried vyvanse but it was not effective in treating her ADHD symptoms.    Interval History and Content of Current Session:  Interim Events/Subjective Report/Content of Current Session:    She described her mood as "good. Some up and down" and affect seemed euthymic. She continues to report that her anxiety "comes and goes" but manageable. She reported having low mood and decreased motivation for a couple of days but never lasting 2 weeks at a time. She reported improved ADHD symptoms with adderall XR 30 mg po daily and adderall IR 10 mg po PRN daily. She reported medication compliance and denied any side effects or history of heart palpitations, syncope, dizziness, dyspnea on exertion, shortness of breath, GI upset, insomnia, seizure, ronn, or chest pain. She denied SI/HI/VH/AH. She denied alcohol or drugs use. She reported adequate sleep and appetite. Checked  and no signs of misuse. She reported her ADHD symptoms are present when the medicine wears off and her overall problems are moderate in severity     Psychotherapy:  · Target symptoms: lack of focus, anxiety   · Why chosen therapy is appropriate versus another modality: evidence based practice  · Outcome monitoring methods: self-report  · Therapeutic intervention type: supportive psychotherapy  · Topics discussed/themes: building skills sets for symptom management, symptom recognition  · The patient's response to the intervention is motivated. The " "patient's progress toward treatment goals is good.   · Duration of intervention:15 minutes.    Review of Systems   · PSYCHIATRIC: Pertinant items are noted in the narrative.  · CONSTITUTIONAL: No weight gain or loss.   · MUSCULOSKELETAL: No pain or stiffness of the joints.  · NEUROLOGIC: No weakness, sensory changes, seizures, confusion, memory loss, tremor or other abnormal movements.  · ENDOCRINE: No polydipsia or polyuria.  · INTEGUMENTARY: No rashes or lacerations.  · EYES: No exophthalmos, jaundice or blindness.  · ENT: No dizziness, tinnitus or hearing loss.  · RESPIRATORY: No shortness of breath.  · CARDIOVASCULAR: No tachycardia or chest pain.  · GASTROINTESTINAL: No nausea, vomiting, pain, constipation or diarrhea.  · GENITOURINARY: No frequency, dysuria or sexual dysfunction.  · HEMATOLOGIC/LYMPHATIC: No excessive bleeding, prolonged or excessive bleeding after dental extraction/injury.  · ALLERGIC/IMMUNOLOGIC: No allergic response to materials, foods or animals at this time.    Past Medical, Family and Social History: The patient's past medical, family and social history have been reviewed and updated as appropriate within the electronic medical record - see encounter notes.    Compliance: yes    Side effects: irritability    Risk Parameters:  Patient reports no suicidal ideation  Patient reports no homicidal ideation  Patient reports no self-injurious behavior  Patient reports no violent behavior    Exam (detailed: at least 9 elements; comprehensive: all 15 elements)   Constitutional  Vitals:  Most recent vital signs, dated greater than 90 days prior to this appointment, were reviewed.   Vitals:    09/10/19 0745   BP: 122/68   Pulse: 96   Weight: 94.3 kg (207 lb 14.3 oz)        General:  unremarkable, age appropriate     Musculoskeletal  Muscle Strength/Tone:  no dystonia, no tremor, no tic   Gait & Station:  non-ataxic     Psychiatric  Speech:  no latency; no press   Mood & Affect:  "regular"  euthymic " and neutral   Thought Process:  slightly anxious but mood congruent   Associations:  intact   Thought Content:  normal, no suicidality, no homicidality, delusions, or paranoia   Insight:  intact   Judgement: behavior is adequate to circumstances   Orientation:  grossly intact   Memory: intact for content of interview   Language: grossly intact   Attention Span & Concentration:  able to focus   Fund of Knowledge:  intact and appropriate to age and level of education     Assessment and Diagnosis   Status/Progress: Based on the examination today, the patient's problem(s) is/are improved.  New problems have not been presented today.   Co-morbidities are not complicating management of the primary condition.  There are no active rule-out diagnoses for this patient at this time.     General Impression: Patient has prior diagnosis of attention deficit disorder combined type and has anxiety symptoms that meets the criteria for anxiety disorder unspecified. Her ADHD symptoms are controlled with Adderall. She continues to report improvements in ADHD symptoms with adderall XR 30 mg po daily and adderall IR 10 mg po prn.      ICD-10-CM ICD-9-CM   1. ADHD (attention deficit hyperactivity disorder), combined type F90.2 314.01   2. Anxiety disorder, unspecified type F41.9 300.00   3. Shifting sleep-work schedule, affecting sleep G47.26 327.36       Intervention/Counseling/Treatment Plan   ? Medication Management: Continue current medications. The risks and benefits of medication were discussed with the patient.   ? Discontinue Vyvanse 30mg qd  ? Continue PRN Adderall IR 10mg -2 E-scripts postdated   ? Continue Adderall XR 30 mg po daily -2 E-scripts posted   ? Discontinue PRN vistaril 15mg bid prn for anxiety or insomnia  ? Continue Melatonin 5mg PRN for shift work sleep problems  · Labs: reviewed most recent  · The treatment plan and follow up plan were reviewed with the patient.  · Discussed with patient informed consent, risks  vs. benefits, alternative treatments, side effect profile and the inherent unpredictability of individual responses to these treatments. The patient expresses understanding of the above and displays the capacity to agree with this current plan and had no other questions.  · Encouraged Patient to keep future appointments.   · Take medications as prescribed and abstain from substance abuse.   · In the event of an emergency patient was advised to go to the emergency room.        Return to Clinic: 3 months

## 2019-09-10 NOTE — PATIENT INSTRUCTIONS
Amphetamine; Dextroamphetamine tablets  What is this medicine?  AMPHETAMINE; DEXTROAMPHETAMINE(am FET a meen; dex troe am FET a meen) is used to treat attention-deficit hyperactivity disorder (ADHD). It may also be used for narcolepsy. Federal law prohibits giving this medicine to any person other than the person for whom it was prescribed. Do not share this medicine with anyone else.  How should I use this medicine?  Take this medicine by mouth with a glass of water. Follow the directions on the prescription label. Take your doses at regular intervals. Do not take your medicine more often than directed. Do not suddenly stop your medicine. You must gradually reduce the dose or you may feel withdrawal effects. Ask your doctor or health care professional for advice.  Talk to your pediatrician regarding the use of this medicine in children. Special care may be needed. While this drug may be prescribed for children as young as 3 years for selected conditions, precautions do apply.  What side effects may I notice from receiving this medicine?  Side effects that you should report to your doctor or health care professional as soon as possible:  · allergic reactions like skin rash, itching or hives, swelling of the face, lips, or tongue  · changes in vision  · chest pain or chest tightness  · confusion, trouble speaking or understanding  · fast, irregular heartbeat  · fingers or toes feel numb, cool, painful  · hallucination, loss of contact with reality  · high blood pressure  · males: prolonged or painful erection  · seizures  · severe headaches  · shortness of breath  · suicidal thoughts or other mood changes  · trouble walking, dizziness, loss of balance or coordination  · uncontrollable head, mouth, neck, arm, or leg movements  Side effects that usually do not require medical attention (report to your doctor or health care professional if they continue or are bothersome):  · anxious  · headache  · loss of  appetite  · nausea, vomiting  · trouble sleeping  · weight loss  What may interact with this medicine?  Do not take this medicine with any of the following medications:  · MAOIS like Carbex, Eldepryl, Marplan, Nardil, and Parnate  · other stimulant medicines for attention disorders, weight loss, or to stay awake  This medicine may also interact with the following medications:  · acetazolamide  · ammonium chloride  · antacids  · ascorbic acid  · atomoxetine  · caffeine  · certain medicines for blood pressure  · certain medicines for depression, anxiety, or psychotic disturbances  · certain medicines for seizures like carbamazepine, phenobarbital, phenytoin  · certain medicines for stomach problems like cimetidine, famotidine, omeprazole, lansoprazole  · cold or allergy medicines  · glutamic acid  · lithium  · meperidine  · methenamine; sodium acid phosphate  · narcotic medicines for pain  · norepinephrine  · phenothiazines like chlorpromazine, mesoridazine, prochlorperazine, thioridazine  · sodium acid phosphate  · sodium bicarbonate  What if I miss a dose?  If you miss a dose, take it as soon as you can. If it is almost time for your next dose, take only that dose. Do not take double or extra doses.  Where should I keep my medicine?  Keep out of the reach of children. This medicine can be abused. Keep your medicine in a safe place to protect it from theft. Do not share this medicine with anyone. Selling or giving away this medicine is dangerous and against the law.  Store at room temperature between 15 and 30 degrees C (59 and 86 degrees F). Keep container tightly closed. Throw away any unused medicine after the expiration date. Dispose of properly. This medicine may cause accidental overdose and death if it is taken by other adults, children, or pets. Mix any unused medicine with a substance like cat litter or coffee grounds. Then throw the medicine away in a sealed container like a sealed bag or a coffee can with  a lid. Do not use the medicine after the expiration date.  What should I tell my health care provider before I take this medicine?  They need to know if you have any of these conditions:  · anxiety or panic attacks  · circulation problems in fingers and toes  · glaucoma  · hardening or blockages of the arteries or heart blood vessels  · heart disease or a heart defect  · high blood pressure  · history of a drug or alcohol abuse problem  · history of stroke  · kidney disease  · liver disease  · mental illness  · seizures  · suicidal thoughts, plans, or attempt; a previous suicide attempt by you or a family member  · thyroid disease  · Tourette's syndrome  · an unusual or allergic reaction to dextroamphetamine, other amphetamines, other medicines, foods, dyes, or preservatives  · pregnant or trying to get pregnant  · breast-feeding  What should I watch for while using this medicine?  Visit your doctor or health care professional for regular checks on your progress. This prescription requires that you follow special procedures with your doctor and pharmacy. You will need to have a new written prescription from your doctor every time you need a refill.  This medicine may affect your concentration, or hide signs of tiredness. Until you know how this medicine affects you, do not drive, ride a bicycle, use machinery, or do anything that needs mental alertness.  Tell your doctor or health care professional if this medicine loses its effects, or if you feel you need to take more than the prescribed amount. Do not change the dosage without talking to your doctor or health care professional.  Decreased appetite is a common side effect when starting this medicine. Eating small, frequent meals or snacks can help. Talk to your doctor if you continue to have poor eating habits. Height and weight growth of a child taking this medicine will be monitored closely.  Do not take this medicine close to bedtime. It may prevent you from  sleeping.  If you are going to need surgery, a MRI, CT scan, or other procedure, tell your doctor that you are taking this medicine. You may need to stop taking this medicine before the procedure.  Tell your doctor or healthcare professional right away if you notice unexplained wounds on your fingers and toes while taking this medicine. You should also tell your healthcare provider if you experience numbness or pain, changes in the skin color, or sensitivity to temperature in your fingers or toes.  NOTE:This sheet is a summary. It may not cover all possible information. If you have questions about this medicine, talk to your doctor, pharmacist, or health care provider. Copyright© 2017 [employer]        Amphetamine; Dextroamphetamine extended-release capsules  ¿Qué es mervat medicamento?  ANFETAMINA; DEXTROANFETAMINA se usa para tratar el trastorno del déficit de atención con hiperactividad (TDAH). Las leyes federales prohíben la administración de mervat medicamento a cualquier persona que no sea el paciente para quien fue recetado. No comparta mervat medicamento con nadie.  ¿Cómo christopher utilizar meravt medicamento?  [place] mervat medicamento por vía oral con un vaso de agua. Siga las instrucciones de la etiqueta del medicamento. Mervat medicamento se mathew sólo dayo vez por día, generalmente a la mañana al despertar. [place] mervat medicamento con o sin alimentos. No mastique ni triture mervat medicamento. Las cápsulas se pueden abrir y espolvorear el contenido sobre dayo cucharada de puré de manzanas. Si espolvorea el contenido sobre puré de manzanas, tome la dosis inmediatamente y no la triture ni mastique. Siempre gladys un vaso de agua u otro líquido después de rosmery mervat medicamento. No tome leahy medicamento con dayo frecuencia mayor a la indicada.  Leahy farmacéutico le dará dayo Gu[place] del medicamento especial con cada receta y relleno. Asegúrese de leer esta información cada vez cuidadosamente.  Hable con leahy pediatra para informarse acerca  del uso de eugene medicamento en niños. Aunque eugene medicamento puede ser recetado a niños tan menores too de 6 años de edad para condiciones selectivas, las precauciones se aplican.  ¿Qué efectos secundarios puedo tener al utilizar eugene medicamento?  Efectos secundarios que debe informar a leahy médico o a leahy profesional de la judah tan pronto too sea posible:  · reacciones alérgicas too erupción cutánea, picazón o urticarias, hinchazón de la raymond, labios o lengua  · cambios en la visión  · dolor en el pecho u opresión en el pecho  · confusión, dificultad para hablar o entender  · pulso cardiaco rápido, irregular  · dedos o dedos de piel se sienten entumecidos, fríos, dolorosos  · alucinaciones, perdida del contacto con la realidad  · bud presión sanguínea  · hombres: erección prolongada o dolorosa  · convulsiones  · shagufta de apurva severas  · falta de aliento  · ideas suicidas u otros cambios de humor  · dificultad para caminar, mareos, pérdida del equilibrio o coordinación  · movimientos incontrolables de la apurva, boca, candida, brazos o piernas  Efectos secundarios que, por lo general, no requieren atención médica (debe informarlos a leahy médico o a leahy profesional de la judah si persisten o si son molestos):  · ansiedad  · dolor de apurva  · pérdida del apetito  · náuseas, vómito  · dificultad para conciliar el sueño  · pérdida de peso  ¿Qué puede interactuar con eugene medicamento?  No tome esta medicina junto con ninguno de los siguientes medicamentos:  · IMAOs, tales too Carbex, Eldepryl, Marplan, Nardil y Parnate  · otros medicamentos estimulantes para trastornos de atención, perder peso o mantenerse despierto  Esta medicina también puede interactuar con los siguientes medicamentos:  · acetazolamida  · cloruro de amonio  · antiácidos  · ácido ascórbico  · atomoxetina  · cafeína  · ciertos medicamentos para la presión sanguínea  · ciertos medicamentos para la depresión, ansiedad o trastornos  psicóticos  · ciertos medicamentos para la diabetes  · ciertos medicamentos para las convulsiones, tales too carbamazepina, fenobarbital, fenitoína  · ciertos medicamentos para problemas estomacales, tales too cimetidina, famotidina, omeprazol, lansoprazol  · medicamentos para resfríos o alergias  · ácido glutámico  · litio  · meperidina  · metenamina; fosfato ácido de sodio  · medicamentos narcóticos para el dolor  · norepinefrina  · fenotiazinas, tales too clorpromacina, mesoridazina, proclorperazina, tioridazina  · bicarbonato de sodio  ¿Qué sucede si me olvido de dayo dosis?  Si olvida dayo dosis, tómela lo antes posible. Si es krish la hora de la próxima dosis, tome sólo luci dosis. No tome dosis adicionales o dobles.  ¿Dónde christopher guardar mi medicina?  [place] fuera del alcance de los niños. [place] medicamento puede ser abusado. Mantenga leahy medicamento en un lugar seguro para protegerlo contra robos. No comparta eugene medicamento con nadie. Es peligroso  o regalar eugene medicamento y está prohibido por la kvng.  Guárdela a temperatura ambiente, entre 15 y 30 grados C (59 y 86 grados F). Mantenga el envase monae cerrado. Protéjala chayo. Deseche los medicamentos que no haya utilizado, después de la fecha de vencimiento.  ¿Qué le christopher informar a mi profesional de la judah antes de rosmery eugene medicamento?  Necesita saber si usted presenta alguno de los siguientes problemas o situaciones:  · ansiedad o ataques de pánico  · problemas de circulación en los dedos o dedos de los pies  · glaucoma  · endurecimiento o bloqueos de las arterias o los vasos sanguíneos del corazón  · enfermedad cardiaca o un defecto del corazón  · bud presión sanguínea  · antecedentes de abuso de alcohol o drogas  · antecedentes de derrame cerebral  · enfermedad renal  · enfermedad hepática  · trastorno mental  · convulsiones  · ideas suicidas, planes o intento; si usted o alguien de leahy belkis ha intentado un suicidio  previo  · enfermedad tiroidea  · síndrome de Tourette  · dayo reacción alérgica o inusual a la dextroanfetamina, a otras anfetaminas, a otros medicamentos, alimentos, colorantes o conservantes  · si está embarazada o buscando quedar embarazada  · si está amamantando a un bebé  ¿A qué christopher estar atento al usar eugene medicamento?  Visite a leahy médico o a leahy profesional de la judah para chequear leahy evolución periódicamente. Para utilizar eugene medicamento es necesario cumplir con ciertos procedimientos especiales ante leahy médico y en la farmacia. Deberá presentar dayo nueva receta escrita por leahy médico cada vez que necesite más medicamento.  [place] medicamento puede afectar leahy concentración u ocultar signos de cansancio. No conduzca vehículos ni bicicletas, no utilice maquinaria ni cleo nada que le exija permanecer en estado de alerta hasta que sepa cómo le afecta eugene medicamento.  Informe a leahy médico o a leahy profesional de la judah si eugene medicamento graciela de actuar o si siente la necesidad de rosmery dayo cantidad mayor que la recetada. No cambie la dosis sin antes consultar a leahy médico o a leahy profesional de la judah.  Al comenzar a rosmery eugene medicamento puede sentir poco apetito. Éste es un efecto secundario común. Para combatir eugene problema, come pequeñas cantidades de comida o refrigerios con frecuencia. Si la falta de apetito persiste, consulte a leahy médico. Se controlará cuidadosamente la evolución de la altura y del peso de los niños que bo eugene medicamento.  No tome eugene medicamento cerca de la hora de acostarse. [place] medicamento le puede impedir dormir.  Si va a someterse a dayo operación, un IRM (imágenes por resonancia magnética), dayo tomografía computarizada u otro procedimiento, informe a leahy médico que está tomando eugene medicamento. Puede ser que necesite dejar de rosmery eugene medicamento antes del procedimiento.  Informe a leahy médico o profesional de la judah inmediatamente si nota heridas inexplicables en deepa  dedos y dedos del pie mientras esté tomando eugene medicamento. También informe a leahy proveedor de atención medica si experimenta entumecimiento o dolor, cambios en el color de la piel, o sensibilidad a la temperatura en los dedos o dedos del pie.  © 4291-6944 [employer]. [address] [place] [address], [place] [address]. Todos los derechos reservados. Esta información no pretende sustituir la atención médica profesional. Sólo leahy médico puede diagnosticar y tratar un problema de judah.

## 2019-09-27 ENCOUNTER — OFFICE VISIT (OUTPATIENT)
Dept: OPHTHALMOLOGY | Facility: CLINIC | Age: 24
End: 2019-09-27
Payer: COMMERCIAL

## 2019-09-27 DIAGNOSIS — H52.213 IRREGULAR ASTIGMATISM OF BOTH EYES: ICD-10-CM

## 2019-09-27 DIAGNOSIS — H18.603 KERATOCONUS OF BOTH EYES: Primary | ICD-10-CM

## 2019-09-27 PROCEDURE — 92014 COMPRE OPH EXAM EST PT 1/>: CPT | Mod: S$GLB,,, | Performed by: OPHTHALMOLOGY

## 2019-09-27 PROCEDURE — 99999 PR PBB SHADOW E&M-EST. PATIENT-LVL III: ICD-10-PCS | Mod: PBBFAC,,, | Performed by: OPHTHALMOLOGY

## 2019-09-27 PROCEDURE — 99999 PR PBB SHADOW E&M-EST. PATIENT-LVL III: CPT | Mod: PBBFAC,,, | Performed by: OPHTHALMOLOGY

## 2019-09-27 PROCEDURE — 92014 PR EYE EXAM, EST PATIENT,COMPREHESV: ICD-10-PCS | Mod: S$GLB,,, | Performed by: OPHTHALMOLOGY

## 2019-09-27 NOTE — PROGRESS NOTES
HPI     Concerns About Ocular Health      Additional comments: 2 mo f/u              Comments     Referred by Dr Feldman     S/p CXL OD 5/30/19   KCN OU- dx'ed age 23     Ofloxacin OD qid  *FINISHED  Pred OD TID *FINISHED   Beprive BID OU   Avenova BID Lids OU   Warm compresses OS   AT's PRN OU     2 mo post CXL OD. Patient states that she no longer has headaches or has   problem with glare.          Last edited by Viviana Gamboa on 9/27/2019  8:10 AM. (History)            Assessment /Plan     For exam results, see Encounter Report.    Keratoconus of both eyes  -     Collagen Cross-Linking; Future    Irregular astigmatism of both eyes      S/p CXL OD 5/30/19   - well healed, off all gtts  - excellent VA cc    KCN OU- dx'ed age 23     KERATOCONUS      The diagnosis of corneal ectasia and its etiology and clinical course were discussed. Treatment with the use of specialty contact lenses, collagen cross linking, and corneal transplantation was explained in detail.      This patient would be a suitable candidate for Collagen Cross linking (CXL) -- the goal of treatment is to strengthen corneal bonds and prevent further progression / ectasia.  VA improvement - BCVA and VAsc - not guaranteed, however is seen in some cases. Most likely would recommend speciality CL fitting post treatment for BCVA.     This patient exhibits signs of progression of keratoconus and failure of conservative treatments with spectacles and/or contact lenses.      Disease progression is indicated by:  - An increase of >1D in the Kmax:   - An increase of >1D of astigmatism in the MRx  - An increase in myopia of >0.50D on MRx     I recommend treatment with Collagen Crosslinking using the Avedro FDA approved epi-off protocol with Photrexa and the KXL System, in order to stabilize this condition.     Plan:   CXL treatment  OS     I have informed the patient that we will seek insurance pre-approval, but in case of failure of the insurance company  to cover the cost of this medically necessary procedure, there may be a cost of $2,000 for the patient.

## 2019-11-01 ENCOUNTER — TELEPHONE (OUTPATIENT)
Dept: PSYCHIATRY | Facility: CLINIC | Age: 24
End: 2019-11-01

## 2019-11-04 ENCOUNTER — TELEPHONE (OUTPATIENT)
Dept: PSYCHIATRY | Facility: HOSPITAL | Age: 24
End: 2019-11-04

## 2019-11-04 NOTE — TELEPHONE ENCOUNTER
Spoke with patient and she stated that she is currently expecting a baby and she is 7 weeks pregnant. She stated that she stopped taking adderall and wants to come back to the clinic after she has her baby. Patient is stable.    Ru      ----- Message from Celina Grant MA sent at 11/4/2019 10:43 AM CST -----  Contact: pt  Pt asked if you can give her a call regarding her expecting. Asked do she still continue to see you throughout her pregnancy.

## 2019-11-06 ENCOUNTER — TELEPHONE (OUTPATIENT)
Dept: PSYCHIATRY | Facility: CLINIC | Age: 24
End: 2019-11-06

## 2019-11-14 ENCOUNTER — TELEPHONE (OUTPATIENT)
Dept: PSYCHIATRY | Facility: CLINIC | Age: 24
End: 2019-11-14

## 2019-12-04 ENCOUNTER — TELEPHONE (OUTPATIENT)
Dept: OPHTHALMOLOGY | Facility: CLINIC | Age: 24
End: 2019-12-04

## 2019-12-04 NOTE — TELEPHONE ENCOUNTER
Called pt no answer left message. Pt considering canceling CXL. Will discuss Tylenol for pain management vs. Postponing

## 2019-12-04 NOTE — TELEPHONE ENCOUNTER
----- Message from Shagufta Amaro MD sent at 12/3/2019  8:14 PM CST -----  Contact: Bernarda  I would say either take tylenol for pain post procedure or postpone.    ----- Message -----  From: Melania Kevin  Sent: 12/3/2019   4:04 PM CST  To: Shagufta Amaro MD    This pt is having CXL on 12/12. She is now expecting and wants to know if she can take the Percocet while pregnant. She is considering canceling the sx if she can not manage the pain after. I explained to her that the Percocet is not always giving for the procedure. She still wants pain management. Is Percocet ok?  ----- Message -----  From: Evelina Sanford  Sent: 12/3/2019   3:59 PM CST  To: Melania Kevin        ----- Message -----  From: Kortney Olivares  Sent: 12/3/2019   3:27 PM CST  To: Sondra DEMARCO Staff    Pt calling regarding upcoming cross-linking procedure. She can be reached at [phone]

## 2019-12-06 ENCOUNTER — TELEPHONE (OUTPATIENT)
Dept: OPHTHALMOLOGY | Facility: CLINIC | Age: 24
End: 2019-12-06

## 2019-12-06 NOTE — TELEPHONE ENCOUNTER
Spoke with pt. Pt ops to R/S CXl for 12/12 until mid July 2/2 pregnancy. Patient would like to wait so that she can have pain management after. Declines taking any OTC meds  
WDL

## 2020-03-13 ENCOUNTER — TELEPHONE (OUTPATIENT)
Dept: OPHTHALMOLOGY | Facility: CLINIC | Age: 25
End: 2020-03-13

## 2020-03-13 DIAGNOSIS — H10.10 ALLERGIC CONJUNCTIVITIS, UNSPECIFIED LATERALITY: Primary | ICD-10-CM

## 2020-03-13 RX ORDER — BEPOTASTINE BESILATE 15 MG/ML
1 SOLUTION/ DROPS OPHTHALMIC 2 TIMES DAILY
Qty: 5 ML | Refills: 6 | Status: SHIPPED | OUTPATIENT
Start: 2020-03-13

## 2020-09-03 ENCOUNTER — TELEPHONE (OUTPATIENT)
Dept: OPHTHALMOLOGY | Facility: CLINIC | Age: 25
End: 2020-09-03

## 2020-09-03 NOTE — TELEPHONE ENCOUNTER
----- Message from Melania Kevin sent at 12/6/2019  4:37 PM CST -----  Needs new refferal for July CXL

## 2020-09-24 ENCOUNTER — PATIENT MESSAGE (OUTPATIENT)
Dept: PSYCHIATRY | Facility: CLINIC | Age: 25
End: 2020-09-24

## 2020-09-24 ENCOUNTER — OFFICE VISIT (OUTPATIENT)
Dept: PSYCHIATRY | Facility: CLINIC | Age: 25
End: 2020-09-24
Payer: COMMERCIAL

## 2020-09-24 DIAGNOSIS — F41.9 ANXIETY DISORDER, UNSPECIFIED TYPE: ICD-10-CM

## 2020-09-24 DIAGNOSIS — G47.26 SHIFTING SLEEP-WORK SCHEDULE, AFFECTING SLEEP: ICD-10-CM

## 2020-09-24 DIAGNOSIS — F90.2 ADHD (ATTENTION DEFICIT HYPERACTIVITY DISORDER), COMBINED TYPE: Primary | ICD-10-CM

## 2020-09-24 DIAGNOSIS — F42.9 OBSESSIVE-COMPULSIVE DISORDER, UNSPECIFIED TYPE: ICD-10-CM

## 2020-09-24 PROCEDURE — 90833 PR PSYCHOTHERAPY W/PATIENT W/E&M, 30 MIN (ADD ON): ICD-10-PCS | Mod: 95,,, | Performed by: NURSE PRACTITIONER

## 2020-09-24 PROCEDURE — 99214 PR OFFICE/OUTPT VISIT, EST, LEVL IV, 30-39 MIN: ICD-10-PCS | Mod: 95,,, | Performed by: NURSE PRACTITIONER

## 2020-09-24 PROCEDURE — 99214 OFFICE O/P EST MOD 30 MIN: CPT | Mod: 95,,, | Performed by: NURSE PRACTITIONER

## 2020-09-24 PROCEDURE — 90833 PSYTX W PT W E/M 30 MIN: CPT | Mod: 95,,, | Performed by: NURSE PRACTITIONER

## 2020-09-24 NOTE — PROGRESS NOTES
"Outpatient Psychiatry Follow-Up Visit (MD/NP)    9/24/2020       The patient location is: Home in [place], [place]  The chief complaint leading to consultation is: OCD, anxiety, & ADHD    Visit type: audiovisual    Face to Face time with patient:  30 minutes  40  minutes of total time spent on the encounter, which includes face to face time and non-face to face time preparing to see the patient (eg, review of tests), Obtaining and/or reviewing separately obtained history, Documenting clinical information in the electronic or other health record, Independently interpreting results (not separately reported) and communicating results to the patient/family/caregiver, or Care coordination (not separately reported).         Each patient to whom he or she provides medical services by telemedicine is:  (1) informed of the relationship between the physician and patient and the respective role of any other health care provider with respect to management of the patient; and (2) notified that he or she may decline to receive medical services by telemedicine and may withdraw from such care at any time.        Clinical Status of Patient:  Outpatient (Ambulatory)    Chief Complaint:  Bernarda Ly is a 25 y.o. female who presented today for follow-up of anxiety, attention problems and OCD.  Patient's chart was reviewed. Met with patient. 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 She was diagnosed with ADHD at age 3 and has been taking stimulant for many years. She tried concerta, ritalin, vyvanse and adderall. She tried vyvanse but it was not effective in treating her ADHD symptoms.    Interval History and Content of Current Session:  Interim Events/Subjective Report/Content of Current Session:    Patient arrived on time for her virtual visit. She stated that she had a baby girl 3 months ago. She described her mood as "up and down" and her affect seemed anxious and tearful at times. She stated, " ""midway in my pregnancy, I started worrying and feeling paranoid about germs. I am afraid of touching things and scared about getting sick. I think with the coronavirus I find myself washing my hands a lot. I am not eating at work because I am afraid of getting sick. My anxiety has gotten worse. My hands are dry and starting to crack. It is affecting my daily activity at home and at work. " She is not breast feeding. She reported feeling very anxious, worried about germs, nervous and unable to relax. She reported adequate sleep and appetite. She reported difficulties with focus and concentration due to ADHD. We discussed treatment options for OCD such SSRI's and CBT with exposure response prevention psychotherapy. She stated that she wants to try therapy alone first and if not effective she will try an SSRI. For ADHD she is going to try adderalll IR 10 mg but was concerned that it may exacerbate her anxiety. Provided supportive therapy and effective CBT skills with exposure response techniques to manage her OCD symptoms.  She denied SI/HI/VH/AH. She denied alcohol or drugs use. She Checked  and no signs of misuse. There were no objective signs or symptoms of psychosis or ronn. She denied feeling depressed or hopeless. She denied symptom of low mood, fatigue, irritably, helplessness, worthlessness, or amotivation.    OCD: Patient endorsed the following OCD symptoms:  -Presence of obsessions, compulsions  -Recurrent and persistent thoughts, urges, or impulses that are experienced, at some time during the disturbance, as intrusive and unwanted, and that in most individuals cause marked anxiety or distress. These thoughts are focused on contamination, germs and getting sick  -Patient attempts to ignore or suppress such thoughts, urges, or images, or to neutralize them with some other thought or action by washing her hands  -Repetitive behaviors of hand washing that she feels driven to perform in response to an " obsession or according to rules that must be applied rigidly.  -The hand washing is aimed at preventing or reducing anxiety or distress, or preventing some dreaded event or situation; however, these behaviors are not connected in a realistic way with what they are designed to neutralize or prevent, or are clearly excessive.  -The obsessions or compulsions are time-consuming (e.g., take more than 1 hour per day) or cause clinically significant distress or impairment in social, occupational, or other important areas of functioning.  - The obsessive-compulsive symptoms are not attributable to the physiological effects of a substance.     Psychotherapy:  · Target symptoms: lack of focus, anxiety   · Why chosen therapy is appropriate versus another modality: evidence based practice  · Outcome monitoring methods: self-report  · Therapeutic intervention type: supportive psychotherapy  · Topics discussed/themes: building skills sets for symptom management, symptom recognition  · The patient's response to the intervention is motivated. The patient's progress toward treatment goals is good.   · Duration of intervention:20 minutes.    Review of Systems   · PSYCHIATRIC: Pertinant items are noted in the narrative.  · CONSTITUTIONAL: No weight gain or loss.   · MUSCULOSKELETAL: No pain or stiffness of the joints.  · NEUROLOGIC: No weakness, sensory changes, seizures, confusion, memory loss, tremor or other abnormal movements.  · ENDOCRINE: No polydipsia or polyuria.  · INTEGUMENTARY: No rashes or lacerations.  · EYES: No exophthalmos, jaundice or blindness.  · ENT: No dizziness, tinnitus or hearing loss.  · RESPIRATORY: No shortness of breath.  · CARDIOVASCULAR: No tachycardia or chest pain.  · GASTROINTESTINAL: No nausea, vomiting, pain, constipation or diarrhea.  · GENITOURINARY: No frequency, dysuria or sexual dysfunction.  · HEMATOLOGIC/LYMPHATIC: No excessive bleeding, prolonged or excessive bleeding after dental  "extraction/injury.  · ALLERGIC/IMMUNOLOGIC: No allergic response to materials, foods or animals at this time.    Past Medical, Family and Social History: The patient's past medical, family and social history have been reviewed and updated as appropriate within the electronic medical record - see encounter notes.    Compliance: yes    Side effects: irritability    Risk Parameters:  Patient reports no suicidal ideation  Patient reports no homicidal ideation  Patient reports no self-injurious behavior  Patient reports no violent behavior    Exam (detailed: at least 9 elements; comprehensive: all 15 elements)   Constitutional  Vitals:  Most recent vital signs, dated greater than 90 days prior to this appointment, were reviewed.   There were no vitals filed for this visit.     General:  unremarkable, age appropriate     Musculoskeletal  Muscle Strength/Tone:  no dystonia, no tremor, no tic   Gait & Station:  non-ataxic     Psychiatric  Speech:  no latency; no press   Mood & Affect:  "up and down"  anxious and tearful at times   Thought Process:  slightly anxious but mood congruent   Associations:  intact   Thought Content:  normal, no suicidality, no homicidality, delusions, or paranoia   Insight:  intact   Judgement: behavior is adequate to circumstances   Orientation:  grossly intact   Memory: intact for content of interview   Language: grossly intact   Attention Span & Concentration:  able to focus   Fund of Knowledge:  intact and appropriate to age and level of education     Assessment and Diagnosis   Status/Progress: Based on the examination today, the patient's problem(s) is/are improved.  New problems have not been presented today.   Co-morbidities are not complicating management of the primary condition.  There are no active rule-out diagnoses for this patient at this time.     General Impression: Patient has prior diagnosis of attention deficit disorder combined type and has anxiety symptoms that meets the " criteria for anxiety disorder unspecified. Her ADHD symptoms are controlled with Adderall. She continues to report improvements in ADHD symptoms with adderall XR 30 mg po daily and adderall IR 10 mg po prn. 9/25/2020 presents with symptom that meet the criteria for OCD. Treatment options of medication SSRI and therapy were discussed.      ICD-10-CM ICD-9-CM   1. ADHD (attention deficit hyperactivity disorder), combined type  F90.2 314.01   2. Obsessive-compulsive disorder, unspecified type  F42.9 300.3   3. Anxiety disorder, unspecified type  F41.9 300.00       Intervention/Counseling/Treatment Plan   ? Medication Management: Continue current medications. The risks and benefits of medication were discussed with the patient.   ? Discontinue Vyvanse 30mg qd  ? Continue PRN Adderall IR 10mg for ADHD  ? Hold off Adderall XR 30 mg po daily for ADHD at this time  ? Discontinue PRN vistaril 15mg bid prn for anxiety or insomnia  ? Continue Melatonin 5mg PRN for shift work sleep problems  ? Start psychotherapy for OCD  ? Discussed OCD treatment with SSRIs like Prozac, Zoloft or Luvox for OCD. Patient wants to hold off taking an SSRI at this and wants to try therapy alone first. Referral for OCD psychotherapy was sent and resources were provided.  · Labs: reviewed most recent  · The treatment plan and follow up plan were reviewed with the patient.  · Discussed with patient informed consent, risks vs. benefits, alternative treatments, side effect profile and the inherent unpredictability of individual responses to these treatments. The patient expresses understanding of the above and displays the capacity to agree with this current plan and had no other questions.  · Encouraged Patient to keep future appointments.   · Take medications as prescribed and abstain from substance abuse.   · In the event of an emergency patient was advised to go to the emergency room.        Return to Clinic: 1 month or earlier as needed    Leonila  Daniela, [facility] Hospital[facility]-BC

## 2020-10-05 ENCOUNTER — PATIENT MESSAGE (OUTPATIENT)
Dept: PSYCHIATRY | Facility: CLINIC | Age: 25
End: 2020-10-05

## 2020-10-05 DIAGNOSIS — F90.2 ADHD (ATTENTION DEFICIT HYPERACTIVITY DISORDER), COMBINED TYPE: ICD-10-CM

## 2020-10-05 RX ORDER — DEXTROAMPHETAMINE SACCHARATE, AMPHETAMINE ASPARTATE MONOHYDRATE, DEXTROAMPHETAMINE SULFATE AND AMPHETAMINE SULFATE 7.5; 7.5; 7.5; 7.5 MG/1; MG/1; MG/1; MG/1
30 CAPSULE, EXTENDED RELEASE ORAL EVERY MORNING
Qty: 30 CAPSULE | Refills: 0 | Status: SHIPPED | OUTPATIENT
Start: 2020-10-05 | End: 2020-11-04

## 2020-10-05 RX ORDER — DEXTROAMPHETAMINE SACCHARATE, AMPHETAMINE ASPARTATE, DEXTROAMPHETAMINE SULFATE AND AMPHETAMINE SULFATE 2.5; 2.5; 2.5; 2.5 MG/1; MG/1; MG/1; MG/1
10 TABLET ORAL DAILY
Qty: 30 TABLET | Refills: 0 | Status: SHIPPED | OUTPATIENT
Start: 2020-10-05 | End: 2020-11-04

## 2020-10-05 RX ORDER — DEXTROAMPHETAMINE SACCHARATE, AMPHETAMINE ASPARTATE MONOHYDRATE, DEXTROAMPHETAMINE SULFATE AND AMPHETAMINE SULFATE 7.5; 7.5; 7.5; 7.5 MG/1; MG/1; MG/1; MG/1
30 CAPSULE, EXTENDED RELEASE ORAL EVERY MORNING
Qty: 30 CAPSULE | Refills: 0 | Status: SHIPPED | OUTPATIENT
Start: 2020-11-03 | End: 2020-12-03

## 2020-10-14 ENCOUNTER — PATIENT MESSAGE (OUTPATIENT)
Dept: PSYCHIATRY | Facility: CLINIC | Age: 25
End: 2020-10-14

## 2020-10-19 ENCOUNTER — PATIENT MESSAGE (OUTPATIENT)
Dept: PSYCHIATRY | Facility: CLINIC | Age: 25
End: 2020-10-19

## 2020-10-19 NOTE — TELEPHONE ENCOUNTER
Spoke with patient and she stated that she is in the process of renewing her CCW. She requested a form to be filled for her CCW.  She stated that she works in un unsafe area and it gives her a sense of safety. Informed patient I will check with my supervisor regarding her request.

## 2020-10-20 ENCOUNTER — PATIENT MESSAGE (OUTPATIENT)
Dept: PSYCHIATRY | Facility: CLINIC | Age: 25
End: 2020-10-20

## 2020-10-20 ENCOUNTER — TELEPHONE (OUTPATIENT)
Dept: PSYCHIATRY | Facility: HOSPITAL | Age: 25
End: 2020-10-20

## 2020-12-10 ENCOUNTER — TELEPHONE (OUTPATIENT)
Dept: OPHTHALMOLOGY | Facility: CLINIC | Age: 25
End: 2020-12-10

## 2020-12-10 NOTE — TELEPHONE ENCOUNTER
----- Message from Melania Kevin sent at 12/9/2020  2:30 PM CST -----  Contact: [phone]    ----- Message -----  From: Dora Taylor  Sent: 12/9/2020   1:45 PM CST  To: Sondra DEMARCO Staff    Pt calling to schedule crosslink procedure. Please follow up.

## 2020-12-22 ENCOUNTER — OFFICE VISIT (OUTPATIENT)
Dept: OPHTHALMOLOGY | Facility: CLINIC | Age: 25
End: 2020-12-22
Payer: COMMERCIAL

## 2020-12-22 DIAGNOSIS — H10.10 ALLERGIC CONJUNCTIVITIS, UNSPECIFIED LATERALITY: Primary | ICD-10-CM

## 2020-12-22 DIAGNOSIS — H18.603 KERATOCONUS OF BOTH EYES: Primary | ICD-10-CM

## 2020-12-22 PROCEDURE — 99999 PR PBB SHADOW E&M-EST. PATIENT-LVL II: CPT | Mod: PBBFAC,,, | Performed by: OPHTHALMOLOGY

## 2020-12-22 PROCEDURE — 92014 PR EYE EXAM, EST PATIENT,COMPREHESV: ICD-10-PCS | Mod: S$GLB,,, | Performed by: OPHTHALMOLOGY

## 2020-12-22 PROCEDURE — 92014 COMPRE OPH EXAM EST PT 1/>: CPT | Mod: S$GLB,,, | Performed by: OPHTHALMOLOGY

## 2020-12-22 PROCEDURE — 99999 PR PBB SHADOW E&M-EST. PATIENT-LVL II: ICD-10-PCS | Mod: PBBFAC,,, | Performed by: OPHTHALMOLOGY

## 2020-12-22 RX ORDER — BEPOTASTINE BESILATE 15 MG/ML
1 SOLUTION/ DROPS OPHTHALMIC DAILY
COMMUNITY
End: 2020-12-22 | Stop reason: SDUPTHER

## 2020-12-22 RX ORDER — DEXTROAMPHETAMINE SACCHARATE, AMPHETAMINE ASPARTATE MONOHYDRATE, DEXTROAMPHETAMINE SULFATE AND AMPHETAMINE SULFATE 7.5; 7.5; 7.5; 7.5 MG/1; MG/1; MG/1; MG/1
30 CAPSULE, EXTENDED RELEASE ORAL EVERY MORNING
COMMUNITY
End: 2021-01-21 | Stop reason: SDUPTHER

## 2020-12-22 RX ORDER — BEPOTASTINE BESILATE 15 MG/ML
1 SOLUTION/ DROPS OPHTHALMIC DAILY
Qty: 10 ML | Refills: 11 | Status: SHIPPED | OUTPATIENT
Start: 2020-12-22

## 2020-12-22 NOTE — PROGRESS NOTES
HPI     Referred by Dr Feldman     S/p CXL OD 5/30/19   [facility] OU- dx'ed age 23     bepreve     26 YO female here for pre-op cxl OS. Pt postponed cxl OS last year- had   baby (yay!). Pt brought own pentacam scans for today's visit. Pt states   and halos are still bothersome while driving at night.     Last edited by Evelina Sanford on 12/22/2020 10:09 AM. (History)            Assessment /Plan     For exam results, see Encounter Report.    Keratoconus of both eyes      KERATOCONUS      The diagnosis of corneal ectasia and its etiology and clinical course were discussed. Treatment with the use of specialty contact lenses, collagen cross linking, and corneal transplantation was explained in detail. This patient has never had LASIK.     This patient would be a suitable candidate for Collagen Cross linking (CXL) -- the goal of treatment is to strengthen corneal bonds and prevent further progression / ectasia.  VA improvement - BCVA and VAsc - not guaranteed, however is seen in some cases. Most likely would recommend speciality CL fitting post treatment for BCVA.     This patient exhibits signs of progression of keratoconus and failure of conservative treatments with spectacles and/or contact lenses.      Disease progression is indicated by:  - An increase of >1D in the Kmax  - An increase of >1D of astigmatism in the MRx  - An increase in myopia of >0.50D on MRx     I recommend treatment with Collagen Crosslinking using the Avedro FDA approved epi-off protocol with Photrexa and the KXL System, in order to stabilize this condition.     Plan:   CXL treatment OS    I have informed the patient that we will seek insurance pre-approval, but in case of failure of the insurance company to cover the cost of this medically necessary procedure, there may be a cost of $2,000 for the patient.      Will do post operative care at [facility] then f/up with dr. feldman at [facility]

## 2021-01-21 ENCOUNTER — PATIENT MESSAGE (OUTPATIENT)
Dept: PSYCHIATRY | Facility: CLINIC | Age: 26
End: 2021-01-21

## 2021-01-21 ENCOUNTER — TELEPHONE (OUTPATIENT)
Dept: OPTOMETRY | Facility: CLINIC | Age: 26
End: 2021-01-21

## 2021-01-21 ENCOUNTER — PATIENT MESSAGE (OUTPATIENT)
Dept: OPTOMETRY | Facility: CLINIC | Age: 26
End: 2021-01-21

## 2021-01-21 RX ORDER — DEXTROAMPHETAMINE SACCHARATE, AMPHETAMINE ASPARTATE MONOHYDRATE, DEXTROAMPHETAMINE SULFATE AND AMPHETAMINE SULFATE 7.5; 7.5; 7.5; 7.5 MG/1; MG/1; MG/1; MG/1
30 CAPSULE, EXTENDED RELEASE ORAL EVERY MORNING
Qty: 30 CAPSULE | Refills: 0 | Status: SHIPPED | OUTPATIENT
Start: 2021-01-21 | End: 2021-02-20

## 2021-02-19 DIAGNOSIS — H18.603 KERATOCONUS OF BOTH EYES: Primary | ICD-10-CM

## 2021-02-19 RX ORDER — DIAZEPAM 2 MG/1
2 TABLET ORAL ONCE AS NEEDED
Qty: 3 TABLET | Refills: 0 | Status: SHIPPED | OUTPATIENT
Start: 2021-02-19 | End: 2021-02-19

## 2021-02-19 RX ORDER — PREDNISOLONE ACETATE 10 MG/ML
1 SUSPENSION/ DROPS OPHTHALMIC 3 TIMES DAILY
Qty: 5 ML | Refills: 3 | Status: SHIPPED | OUTPATIENT
Start: 2021-02-19 | End: 2022-02-19

## 2021-02-19 RX ORDER — OFLOXACIN 3 MG/ML
1 SOLUTION/ DROPS OPHTHALMIC 3 TIMES DAILY
Qty: 5 ML | Refills: 3 | Status: SHIPPED | OUTPATIENT
Start: 2021-02-19 | End: 2021-03-01

## 2021-02-22 ENCOUNTER — OFFICE VISIT (OUTPATIENT)
Dept: PSYCHIATRY | Facility: CLINIC | Age: 26
End: 2021-02-22
Payer: COMMERCIAL

## 2021-02-22 DIAGNOSIS — F42.2 MIXED OBSESSIONAL THOUGHTS AND ACTS: ICD-10-CM

## 2021-02-22 DIAGNOSIS — F41.9 ANXIETY DISORDER, UNSPECIFIED TYPE: ICD-10-CM

## 2021-02-22 DIAGNOSIS — F90.2 ADHD (ATTENTION DEFICIT HYPERACTIVITY DISORDER), COMBINED TYPE: Primary | ICD-10-CM

## 2021-02-22 PROCEDURE — 99214 OFFICE O/P EST MOD 30 MIN: CPT | Mod: 95,,, | Performed by: NURSE PRACTITIONER

## 2021-02-22 PROCEDURE — 99214 PR OFFICE/OUTPT VISIT, EST, LEVL IV, 30-39 MIN: ICD-10-PCS | Mod: 95,,, | Performed by: NURSE PRACTITIONER

## 2021-02-22 RX ORDER — DIAZEPAM 2 MG/1
2 TABLET ORAL ONCE AS NEEDED
Qty: 1 TABLET | Refills: 0 | Status: SHIPPED | OUTPATIENT
Start: 2021-02-22 | End: 2023-04-04

## 2021-02-22 RX ORDER — DEXTROAMPHETAMINE SACCHARATE, AMPHETAMINE ASPARTATE, DEXTROAMPHETAMINE SULFATE AND AMPHETAMINE SULFATE 2.5; 2.5; 2.5; 2.5 MG/1; MG/1; MG/1; MG/1
10 TABLET ORAL DAILY PRN
Qty: 30 TABLET | Refills: 0 | Status: SHIPPED | OUTPATIENT
Start: 2021-02-22 | End: 2021-03-24

## 2021-02-22 RX ORDER — DEXTROAMPHETAMINE SACCHARATE, AMPHETAMINE ASPARTATE MONOHYDRATE, DEXTROAMPHETAMINE SULFATE AND AMPHETAMINE SULFATE 7.5; 7.5; 7.5; 7.5 MG/1; MG/1; MG/1; MG/1
30 CAPSULE, EXTENDED RELEASE ORAL EVERY MORNING
Qty: 30 CAPSULE | Refills: 0 | Status: SHIPPED | OUTPATIENT
Start: 2021-02-28 | End: 2021-06-01 | Stop reason: SDUPTHER

## 2021-02-22 RX ORDER — PREDNISOLONE ACETATE 10 MG/ML
1 SUSPENSION/ DROPS OPHTHALMIC 3 TIMES DAILY
Qty: 5 ML | Refills: 2 | Status: SHIPPED | OUTPATIENT
Start: 2021-02-22 | End: 2022-02-22

## 2021-02-22 RX ORDER — OFLOXACIN 3 MG/ML
1 SOLUTION/ DROPS OPHTHALMIC 3 TIMES DAILY
Qty: 5 ML | Refills: 1 | Status: SHIPPED | OUTPATIENT
Start: 2021-02-22 | End: 2021-03-04

## 2021-02-22 RX ORDER — OXYCODONE AND ACETAMINOPHEN 7.5; 325 MG/1; MG/1
1 TABLET ORAL EVERY 6 HOURS PRN
Qty: 10 TABLET | Refills: 0 | Status: SHIPPED | OUTPATIENT
Start: 2021-02-22 | End: 2021-02-24 | Stop reason: SDUPTHER

## 2021-02-24 ENCOUNTER — CLINICAL SUPPORT (OUTPATIENT)
Dept: OPHTHALMOLOGY | Facility: CLINIC | Age: 26
End: 2021-02-24
Attending: OPHTHALMOLOGY
Payer: COMMERCIAL

## 2021-02-24 DIAGNOSIS — H18.603 KERATOCONUS OF BOTH EYES: ICD-10-CM

## 2021-02-24 PROCEDURE — 0402T PR COLLAGEN CROSSLINKING CORNEA: ICD-10-PCS | Mod: LT,S$GLB,, | Performed by: OPHTHALMOLOGY

## 2021-02-24 PROCEDURE — 0402T COLGN CRS-LINK CRN&PACHYMTRY: CPT | Mod: LT,S$GLB,, | Performed by: OPHTHALMOLOGY

## 2021-02-24 PROCEDURE — 99499 UNLISTED E&M SERVICE: CPT | Mod: S$GLB,,, | Performed by: OPHTHALMOLOGY

## 2021-02-24 PROCEDURE — 99499 NO LOS: ICD-10-PCS | Mod: S$GLB,,, | Performed by: OPHTHALMOLOGY

## 2021-02-24 RX ORDER — OXYCODONE AND ACETAMINOPHEN 7.5; 325 MG/1; MG/1
1 TABLET ORAL EVERY 6 HOURS PRN
Qty: 10 TABLET | Refills: 0 | Status: SHIPPED | OUTPATIENT
Start: 2021-02-24 | End: 2022-02-24

## 2021-03-05 ENCOUNTER — OFFICE VISIT (OUTPATIENT)
Dept: OPHTHALMOLOGY | Facility: CLINIC | Age: 26
End: 2021-03-05
Payer: COMMERCIAL

## 2021-03-05 DIAGNOSIS — H18.603 KERATOCONUS OF BOTH EYES: Primary | ICD-10-CM

## 2021-03-05 PROCEDURE — 99999 PR PBB SHADOW E&M-EST. PATIENT-LVL III: CPT | Mod: PBBFAC,,, | Performed by: OPHTHALMOLOGY

## 2021-03-05 PROCEDURE — 92012 PR EYE EXAM, EST PATIENT,INTERMED: ICD-10-PCS | Mod: S$GLB,,, | Performed by: OPHTHALMOLOGY

## 2021-03-05 PROCEDURE — 92012 INTRM OPH EXAM EST PATIENT: CPT | Mod: S$GLB,,, | Performed by: OPHTHALMOLOGY

## 2021-03-05 PROCEDURE — 99999 PR PBB SHADOW E&M-EST. PATIENT-LVL III: ICD-10-PCS | Mod: PBBFAC,,, | Performed by: OPHTHALMOLOGY

## 2021-04-01 ENCOUNTER — PATIENT MESSAGE (OUTPATIENT)
Dept: PSYCHIATRY | Facility: CLINIC | Age: 26
End: 2021-04-01

## 2021-05-04 ENCOUNTER — PATIENT MESSAGE (OUTPATIENT)
Dept: RESEARCH | Facility: HOSPITAL | Age: 26
End: 2021-05-04

## 2021-06-01 ENCOUNTER — PATIENT MESSAGE (OUTPATIENT)
Dept: PSYCHIATRY | Facility: CLINIC | Age: 26
End: 2021-06-01

## 2021-06-01 DIAGNOSIS — F90.2 ADHD (ATTENTION DEFICIT HYPERACTIVITY DISORDER), COMBINED TYPE: ICD-10-CM

## 2021-06-01 RX ORDER — DEXTROAMPHETAMINE SACCHARATE, AMPHETAMINE ASPARTATE MONOHYDRATE, DEXTROAMPHETAMINE SULFATE AND AMPHETAMINE SULFATE 7.5; 7.5; 7.5; 7.5 MG/1; MG/1; MG/1; MG/1
30 CAPSULE, EXTENDED RELEASE ORAL EVERY MORNING
Qty: 30 CAPSULE | Refills: 0 | Status: SHIPPED | OUTPATIENT
Start: 2021-06-01 | End: 2021-07-01

## 2021-06-02 ENCOUNTER — OFFICE VISIT (OUTPATIENT)
Dept: PSYCHIATRY | Facility: CLINIC | Age: 26
End: 2021-06-02
Payer: COMMERCIAL

## 2021-06-02 DIAGNOSIS — F90.2 ATTENTION DEFICIT HYPERACTIVITY DISORDER (ADHD), COMBINED TYPE: Primary | ICD-10-CM

## 2021-06-02 DIAGNOSIS — F41.9 ANXIETY DISORDER, UNSPECIFIED TYPE: ICD-10-CM

## 2021-06-02 PROCEDURE — 99214 OFFICE O/P EST MOD 30 MIN: CPT | Mod: 95,,, | Performed by: NURSE PRACTITIONER

## 2021-06-02 PROCEDURE — 99214 PR OFFICE/OUTPT VISIT, EST, LEVL IV, 30-39 MIN: ICD-10-PCS | Mod: 95,,, | Performed by: NURSE PRACTITIONER

## 2021-06-02 RX ORDER — DEXTROAMPHETAMINE SACCHARATE, AMPHETAMINE ASPARTATE MONOHYDRATE, DEXTROAMPHETAMINE SULFATE AND AMPHETAMINE SULFATE 7.5; 7.5; 7.5; 7.5 MG/1; MG/1; MG/1; MG/1
30 CAPSULE, EXTENDED RELEASE ORAL EVERY MORNING
Qty: 30 CAPSULE | Refills: 0 | Status: SHIPPED | OUTPATIENT
Start: 2021-07-26 | End: 2021-08-25

## 2021-06-02 RX ORDER — DEXTROAMPHETAMINE SACCHARATE, AMPHETAMINE ASPARTATE, DEXTROAMPHETAMINE SULFATE AND AMPHETAMINE SULFATE 2.5; 2.5; 2.5; 2.5 MG/1; MG/1; MG/1; MG/1
10 TABLET ORAL DAILY PRN
Qty: 30 TABLET | Refills: 0 | Status: SHIPPED | OUTPATIENT
Start: 2021-06-15 | End: 2021-07-15

## 2021-06-02 RX ORDER — DEXTROAMPHETAMINE SACCHARATE, AMPHETAMINE ASPARTATE MONOHYDRATE, DEXTROAMPHETAMINE SULFATE AND AMPHETAMINE SULFATE 7.5; 7.5; 7.5; 7.5 MG/1; MG/1; MG/1; MG/1
30 CAPSULE, EXTENDED RELEASE ORAL EVERY MORNING
Qty: 30 CAPSULE | Refills: 0 | Status: SHIPPED | OUTPATIENT
Start: 2021-06-28 | End: 2021-07-28

## 2021-08-02 ENCOUNTER — NURSE TRIAGE (OUTPATIENT)
Dept: ADMINISTRATIVE | Facility: CLINIC | Age: 26
End: 2021-08-02

## 2021-08-23 ENCOUNTER — PATIENT MESSAGE (OUTPATIENT)
Dept: PSYCHIATRY | Facility: CLINIC | Age: 26
End: 2021-08-23

## 2021-08-23 DIAGNOSIS — F90.2 ADHD (ATTENTION DEFICIT HYPERACTIVITY DISORDER), COMBINED TYPE: Primary | ICD-10-CM

## 2021-08-23 RX ORDER — DEXTROAMPHETAMINE SACCHARATE, AMPHETAMINE ASPARTATE, DEXTROAMPHETAMINE SULFATE AND AMPHETAMINE SULFATE 2.5; 2.5; 2.5; 2.5 MG/1; MG/1; MG/1; MG/1
10 TABLET ORAL DAILY PRN
Qty: 30 TABLET | Refills: 0 | Status: SHIPPED | OUTPATIENT
Start: 2021-08-23 | End: 2021-09-14 | Stop reason: SDUPTHER

## 2021-09-14 ENCOUNTER — PATIENT MESSAGE (OUTPATIENT)
Dept: PSYCHIATRY | Facility: CLINIC | Age: 26
End: 2021-09-14

## 2021-09-14 DIAGNOSIS — F90.2 ADHD (ATTENTION DEFICIT HYPERACTIVITY DISORDER), COMBINED TYPE: ICD-10-CM

## 2021-09-14 RX ORDER — DEXTROAMPHETAMINE SACCHARATE, AMPHETAMINE ASPARTATE MONOHYDRATE, DEXTROAMPHETAMINE SULFATE AND AMPHETAMINE SULFATE 7.5; 7.5; 7.5; 7.5 MG/1; MG/1; MG/1; MG/1
30 CAPSULE, EXTENDED RELEASE ORAL EVERY MORNING
Qty: 30 CAPSULE | Refills: 0 | Status: SHIPPED | OUTPATIENT
Start: 2021-09-14 | End: 2021-11-18 | Stop reason: SDUPTHER

## 2021-09-14 RX ORDER — DEXTROAMPHETAMINE SACCHARATE, AMPHETAMINE ASPARTATE, DEXTROAMPHETAMINE SULFATE AND AMPHETAMINE SULFATE 2.5; 2.5; 2.5; 2.5 MG/1; MG/1; MG/1; MG/1
10 TABLET ORAL DAILY PRN
Qty: 30 TABLET | Refills: 0 | Status: SHIPPED | OUTPATIENT
Start: 2021-09-14 | End: 2021-10-14

## 2021-10-15 ENCOUNTER — PATIENT MESSAGE (OUTPATIENT)
Dept: PSYCHIATRY | Facility: CLINIC | Age: 26
End: 2021-10-15
Payer: COMMERCIAL

## 2021-11-04 ENCOUNTER — TELEPHONE (OUTPATIENT)
Dept: PSYCHIATRY | Facility: CLINIC | Age: 26
End: 2021-11-04
Payer: COMMERCIAL

## 2021-11-05 ENCOUNTER — PATIENT MESSAGE (OUTPATIENT)
Dept: PSYCHIATRY | Facility: CLINIC | Age: 26
End: 2021-11-05
Payer: COMMERCIAL

## 2021-11-18 ENCOUNTER — OFFICE VISIT (OUTPATIENT)
Dept: PSYCHIATRY | Facility: CLINIC | Age: 26
End: 2021-11-18
Payer: COMMERCIAL

## 2021-11-18 DIAGNOSIS — F90.2 ADHD (ATTENTION DEFICIT HYPERACTIVITY DISORDER), COMBINED TYPE: Primary | ICD-10-CM

## 2021-11-18 DIAGNOSIS — F41.9 ANXIETY DISORDER, UNSPECIFIED TYPE: ICD-10-CM

## 2021-11-18 DIAGNOSIS — Z86.59 HISTORY OF OCD (OBSESSIVE COMPULSIVE DISORDER): ICD-10-CM

## 2021-11-18 PROCEDURE — 99214 PR OFFICE/OUTPT VISIT, EST, LEVL IV, 30-39 MIN: ICD-10-PCS | Mod: 95,,, | Performed by: NURSE PRACTITIONER

## 2021-11-18 PROCEDURE — 99214 OFFICE O/P EST MOD 30 MIN: CPT | Mod: 95,,, | Performed by: NURSE PRACTITIONER

## 2021-11-18 RX ORDER — DEXTROAMPHETAMINE SACCHARATE, AMPHETAMINE ASPARTATE, DEXTROAMPHETAMINE SULFATE AND AMPHETAMINE SULFATE 2.5; 2.5; 2.5; 2.5 MG/1; MG/1; MG/1; MG/1
10 TABLET ORAL DAILY PRN
Qty: 30 TABLET | Refills: 0 | Status: SHIPPED | OUTPATIENT
Start: 2022-01-03 | End: 2022-03-23 | Stop reason: SDUPTHER

## 2021-11-18 RX ORDER — LISDEXAMFETAMINE DIMESYLATE 30 MG/1
30 CAPSULE ORAL EVERY MORNING
Qty: 30 CAPSULE | Refills: 0 | Status: SHIPPED | OUTPATIENT
Start: 2021-11-18 | End: 2021-11-29

## 2021-11-18 RX ORDER — DEXTROAMPHETAMINE SACCHARATE, AMPHETAMINE ASPARTATE, DEXTROAMPHETAMINE SULFATE AND AMPHETAMINE SULFATE 2.5; 2.5; 2.5; 2.5 MG/1; MG/1; MG/1; MG/1
10 TABLET ORAL DAILY
Qty: 30 TABLET | Refills: 0 | Status: SHIPPED | OUTPATIENT
Start: 2021-12-06 | End: 2022-01-05

## 2021-11-18 RX ORDER — DEXTROAMPHETAMINE SACCHARATE, AMPHETAMINE ASPARTATE MONOHYDRATE, DEXTROAMPHETAMINE SULFATE AND AMPHETAMINE SULFATE 7.5; 7.5; 7.5; 7.5 MG/1; MG/1; MG/1; MG/1
30 CAPSULE, EXTENDED RELEASE ORAL EVERY MORNING
Qty: 30 CAPSULE | Refills: 0 | Status: SHIPPED | OUTPATIENT
Start: 2021-12-30 | End: 2021-11-29 | Stop reason: SDUPTHER

## 2021-11-29 ENCOUNTER — PATIENT MESSAGE (OUTPATIENT)
Dept: PSYCHIATRY | Facility: CLINIC | Age: 26
End: 2021-11-29
Payer: COMMERCIAL

## 2021-11-29 DIAGNOSIS — F90.2 ADHD (ATTENTION DEFICIT HYPERACTIVITY DISORDER), COMBINED TYPE: ICD-10-CM

## 2021-11-29 RX ORDER — DEXTROAMPHETAMINE SACCHARATE, AMPHETAMINE ASPARTATE MONOHYDRATE, DEXTROAMPHETAMINE SULFATE AND AMPHETAMINE SULFATE 7.5; 7.5; 7.5; 7.5 MG/1; MG/1; MG/1; MG/1
30 CAPSULE, EXTENDED RELEASE ORAL EVERY MORNING
Qty: 30 CAPSULE | Refills: 0 | Status: SHIPPED | OUTPATIENT
Start: 2021-12-30 | End: 2022-01-29

## 2021-11-29 RX ORDER — DEXTROAMPHETAMINE SACCHARATE, AMPHETAMINE ASPARTATE MONOHYDRATE, DEXTROAMPHETAMINE SULFATE AND AMPHETAMINE SULFATE 7.5; 7.5; 7.5; 7.5 MG/1; MG/1; MG/1; MG/1
30 CAPSULE, EXTENDED RELEASE ORAL EVERY MORNING
Qty: 30 CAPSULE | Refills: 0 | Status: SHIPPED | OUTPATIENT
Start: 2021-11-29 | End: 2021-12-29

## 2021-12-20 ENCOUNTER — PATIENT MESSAGE (OUTPATIENT)
Dept: PSYCHIATRY | Facility: CLINIC | Age: 26
End: 2021-12-20
Payer: COMMERCIAL

## 2021-12-21 ENCOUNTER — TELEPHONE (OUTPATIENT)
Dept: PSYCHIATRY | Facility: CLINIC | Age: 26
End: 2021-12-21
Payer: COMMERCIAL

## 2022-03-23 ENCOUNTER — OFFICE VISIT (OUTPATIENT)
Dept: PSYCHIATRY | Facility: CLINIC | Age: 27
End: 2022-03-23
Payer: COMMERCIAL

## 2022-03-23 DIAGNOSIS — G47.26 SHIFTING SLEEP-WORK SCHEDULE, AFFECTING SLEEP: ICD-10-CM

## 2022-03-23 DIAGNOSIS — Z86.59 HISTORY OF OCD (OBSESSIVE COMPULSIVE DISORDER): ICD-10-CM

## 2022-03-23 DIAGNOSIS — F90.2 ADHD (ATTENTION DEFICIT HYPERACTIVITY DISORDER), COMBINED TYPE: Primary | ICD-10-CM

## 2022-03-23 PROCEDURE — 99214 OFFICE O/P EST MOD 30 MIN: CPT | Mod: 95,,, | Performed by: NURSE PRACTITIONER

## 2022-03-23 PROCEDURE — 99214 PR OFFICE/OUTPT VISIT, EST, LEVL IV, 30-39 MIN: ICD-10-PCS | Mod: 95,,, | Performed by: NURSE PRACTITIONER

## 2022-03-23 RX ORDER — DEXTROAMPHETAMINE SACCHARATE, AMPHETAMINE ASPARTATE, DEXTROAMPHETAMINE SULFATE AND AMPHETAMINE SULFATE 2.5; 2.5; 2.5; 2.5 MG/1; MG/1; MG/1; MG/1
10 TABLET ORAL DAILY PRN
Qty: 30 TABLET | Refills: 0 | Status: SHIPPED | OUTPATIENT
Start: 2022-03-23 | End: 2022-04-22

## 2022-03-23 RX ORDER — DEXTROAMPHETAMINE SACCHARATE, AMPHETAMINE ASPARTATE MONOHYDRATE, DEXTROAMPHETAMINE SULFATE AND AMPHETAMINE SULFATE 7.5; 7.5; 7.5; 7.5 MG/1; MG/1; MG/1; MG/1
30 CAPSULE, EXTENDED RELEASE ORAL EVERY MORNING
Qty: 30 CAPSULE | Refills: 0 | Status: SHIPPED | OUTPATIENT
Start: 2022-03-23 | End: 2022-04-22

## 2022-03-23 RX ORDER — DEXTROAMPHETAMINE SACCHARATE, AMPHETAMINE ASPARTATE MONOHYDRATE, DEXTROAMPHETAMINE SULFATE AND AMPHETAMINE SULFATE 7.5; 7.5; 7.5; 7.5 MG/1; MG/1; MG/1; MG/1
30 CAPSULE, EXTENDED RELEASE ORAL EVERY MORNING
Qty: 30 CAPSULE | Refills: 0 | Status: SHIPPED | OUTPATIENT
Start: 2022-05-01 | End: 2022-05-31

## 2022-03-23 RX ORDER — DEXTROAMPHETAMINE SACCHARATE, AMPHETAMINE ASPARTATE, DEXTROAMPHETAMINE SULFATE AND AMPHETAMINE SULFATE 2.5; 2.5; 2.5; 2.5 MG/1; MG/1; MG/1; MG/1
10 TABLET ORAL DAILY PRN
Qty: 30 TABLET | Refills: 0 | Status: SHIPPED | OUTPATIENT
Start: 2022-05-01 | End: 2022-09-07 | Stop reason: SDUPTHER

## 2022-03-23 NOTE — PROGRESS NOTES
Outpatient Psychiatry Follow-Up Visit (MD/NP)    3/23/2022       The patient location is: Home in [place], [place]  The chief complaint leading to consultation is: OCD, anxiety, & ADHD    Visit type: audiovisual    Face to Face time with patient:  30 minutes  40  minutes of total time spent on the encounter, which includes face to face time and non-face to face time preparing to see the patient (eg, review of tests), Obtaining and/or reviewing separately obtained history, Documenting clinical information in the electronic or other health record, Independently interpreting results (not separately reported) and communicating results to the patient/family/caregiver, or Care coordination (not separately reported).         Each patient to whom he or she provides medical services by telemedicine is:  (1) informed of the relationship between the physician and patient and the respective role of any other health care provider with respect to management of the patient; and (2) notified that he or she may decline to receive medical services by telemedicine and may withdraw from such care at any time.        Clinical Status of Patient:  Outpatient (Ambulatory)    Chief Complaint:  Bernarda Cordero is a 26 y.o. female who presented today for follow-up of anxiety, attention problems and OCD.  Patient's chart was reviewed. Met with patient.     Interval History and Content of Current Session:  Interim Events/Subjective Report/Content of Current Session:    Patient reported doing okay since her last visit. 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 She is followed by her neurologist for treatment and monitoring. She stated her baby girl is 20  months now and doing well. 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 She is staying busy working full time while going to NP school, and taking care of her baby.    She stated she was having stress at work,  "felt traumatized, had counseling, and decided to move to a different position in her company. 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      She described her mood as "okay" and her affect was mood congruent and appropriate. She reported overall improved motivation, anxiety and concentration. She continues to report managed ADHD symptoms with adderall xr 30 mg po qam and adderall IR 10 mg po qnoon prn. She stated she continues to take drug holiday days to avoid dependence on adderall. She denied side effects including but not limited to palpitations, SOB, chest pain, nausea, vomiting, irritability, anxiety, insomnia or GI upset.  She continue to report that her OCD symptoms have improved significantly. She denied SI/HI/VH/AH. She denied alcohol or drugs use. Checked  and no signs of misuse. There were no objective signs or symptoms of psychosis or ronn. She denied feeling depressed or hopeless. She denied symptom of low mood, fatigue, irritably, helplessness, worthlessness, or anhedonia.       Psychiatric Review Of Systems - Is patient experiencing or having changes in:  sleep: no  appetite: no  weight: no  energy/anergy: no  interest/pleasure/anhedonia: no  somatic symptoms: no  anxiety/panic: no  guilty/hopelessness: no  concentration: no  S.I.B.s/risky behavior: no  Irritability: no  Racing thoughts: no  Impulsive behaviors: no  Paranoia:no  AVH:no  Suicidal thoughts/plan/intent: no    Previous Medication trials:  Vistaril 15mg bid prn for anxiety or insomnia  Vyvanse 30 mg po daily ( effective but stopped it due to cost)  Concerta  Ritalin    Current Medications:  Adderall xl 30 mg po daily  Adderall ir 10 mg po qnoon prn  Melatonin 5 mg po qhs prn       Psychotherapy:  · Target symptoms: lack of focus, OCD  · Why chosen therapy is appropriate versus another modality: evidence based practice  · Outcome monitoring methods: " self-report, observation  · Therapeutic intervention type: insight oriented psychotherapy, behavior modifying psychotherapy, supportive psychotherapy  · Topics discussed/themes: building skills sets for symptom management, symptom recognition  · The patient's response to the intervention is motivated. The patient's progress toward treatment goals is good.   · Duration of intervention:10 minutes.    Review of Systems   · PSYCHIATRIC: Pertinant items are noted in the narrative.  · CONSTITUTIONAL: No weight gain or loss.   · MUSCULOSKELETAL: No pain or stiffness of the joints.  · NEUROLOGIC: No weakness, sensory changes, seizures, confusion, memory loss, tremor or other abnormal movements.  · ENDOCRINE: No polydipsia or polyuria.  · INTEGUMENTARY: No rashes or lacerations.  · EYES: No exophthalmos, jaundice or blindness.  · ENT: No dizziness, tinnitus or hearing loss.  · RESPIRATORY: No shortness of breath.  · CARDIOVASCULAR: No tachycardia or chest pain.  · GASTROINTESTINAL: No nausea, vomiting, pain, constipation or diarrhea.  · GENITOURINARY: No frequency, dysuria or sexual dysfunction.  · HEMATOLOGIC/LYMPHATIC: No excessive bleeding, prolonged or excessive bleeding after dental extraction/injury.  · ALLERGIC/IMMUNOLOGIC: No allergic response to materials, foods or animals at this time.    Past Medical, Family and Social History: The patient's past medical, family and social history have been reviewed and updated as appropriate within the electronic medical record - see encounter notes.    Compliance: yes    Side effects: None    Risk Parameters:  Patient reports no suicidal ideation  Patient reports no homicidal ideation  Patient reports no self-injurious behavior  Patient reports no violent behavior    Exam (detailed: at least 9 elements; comprehensive: all 15 elements)   Constitutional  Vitals:  Most recent vital signs, dated greater than 90 days prior to this appointment, were reviewed.   There were no vitals  "filed for this visit.     General:  unremarkable, age appropriate     Musculoskeletal  Muscle Strength/Tone:  no dystonia, no tremor, no tic   Gait & Station:  non-ataxic     Psychiatric  Speech:  no latency; no press   Mood & Affect:  "okay"  mood-congruent   Thought Process:  logical   Associations:  intact   Thought Content:  normal, no suicidality, no homicidality, delusions, or paranoia   Insight:  intact   Judgement: behavior is adequate to circumstances   Orientation:  grossly intact   Memory: intact for content of interview   Language: grossly intact   Attention Span & Concentration:  able to focus   Fund of Knowledge:  intact and appropriate to age and level of education     Assessment and Diagnosis   Status/Progress: Based on the examination today, the patient's problem(s) is/are improved.  New problems have not been presented today.   Co-morbidities are not complicating management of the primary condition.  There are no active rule-out diagnoses for this patient at this time.     General Impression: Doing well        ICD-10-CM ICD-9-CM   1. ADHD (attention deficit hyperactivity disorder), combined type  F90.2 314.01   2. Shifting sleep-work schedule, affecting sleep  G47.26 327.36   3. History of OCD (obsessive compulsive disorder)  Z86.59 V11.2       Intervention/Counseling/Treatment Plan   ? Medication Management: Continue current medications. The risks and benefits of medication were discussed with the patient.   ? Continue PRN Adderall IR 10mg for ADHD  ? Continue Adderall XR 30 mg po daily for ADHD   ? Continue Melatonin 5mg PRN for shift work sleep problems  ? Encouraged patient to go to the office to get vitals ASAP.   ? Continue utilization of effective CBT skills, positive self-talk, cognitive reframing, meditation, mindfulness, deep breathing, and relaxations skills.  · Labs: reviewed most recent.   · The treatment plan and follow up plan were reviewed with the patient.  · Discussed with patient " informed consent, risks vs. benefits, alternative treatments, side effect profile and the inherent unpredictability of individual responses to these treatments. The patient expresses understanding of the above and displays the capacity to agree with this current plan and had no other questions.  · Encouraged Patient to keep future appointments.   · Take medications as prescribed    · In the event of an emergency patient was advised to go to the emergency room.        Return to Clinic: 3 months or earlier as needed    FRANNY LopezP-BC

## 2022-03-25 VITALS
DIASTOLIC BLOOD PRESSURE: 78 MMHG | SYSTOLIC BLOOD PRESSURE: 112 MMHG | HEART RATE: 78 BPM | WEIGHT: 212.75 LBS | BODY MASS INDEX: 37.69 KG/M2

## 2022-09-07 ENCOUNTER — PATIENT MESSAGE (OUTPATIENT)
Dept: PSYCHIATRY | Facility: CLINIC | Age: 27
End: 2022-09-07
Payer: COMMERCIAL

## 2022-09-07 DIAGNOSIS — F90.2 ADHD (ATTENTION DEFICIT HYPERACTIVITY DISORDER), COMBINED TYPE: ICD-10-CM

## 2022-09-07 RX ORDER — DEXTROAMPHETAMINE SACCHARATE, AMPHETAMINE ASPARTATE, DEXTROAMPHETAMINE SULFATE AND AMPHETAMINE SULFATE 2.5; 2.5; 2.5; 2.5 MG/1; MG/1; MG/1; MG/1
10 TABLET ORAL DAILY PRN
Qty: 30 TABLET | Refills: 0 | Status: SHIPPED | OUTPATIENT
Start: 2022-09-07 | End: 2022-10-07

## 2022-09-08 ENCOUNTER — OFFICE VISIT (OUTPATIENT)
Dept: PSYCHIATRY | Facility: CLINIC | Age: 27
End: 2022-09-08
Payer: COMMERCIAL

## 2022-09-08 DIAGNOSIS — F41.9 ANXIETY DISORDER, UNSPECIFIED TYPE: ICD-10-CM

## 2022-09-08 DIAGNOSIS — F90.0 ATTENTION DEFICIT HYPERACTIVITY DISORDER (ADHD), PREDOMINANTLY INATTENTIVE TYPE: Primary | ICD-10-CM

## 2022-09-08 PROCEDURE — 99213 OFFICE O/P EST LOW 20 MIN: CPT | Mod: 95,,, | Performed by: NURSE PRACTITIONER

## 2022-09-08 PROCEDURE — 99213 PR OFFICE/OUTPT VISIT, EST, LEVL III, 20-29 MIN: ICD-10-PCS | Mod: 95,,, | Performed by: NURSE PRACTITIONER

## 2022-09-08 RX ORDER — DEXTROAMPHETAMINE SACCHARATE, AMPHETAMINE ASPARTATE MONOHYDRATE, DEXTROAMPHETAMINE SULFATE AND AMPHETAMINE SULFATE 7.5; 7.5; 7.5; 7.5 MG/1; MG/1; MG/1; MG/1
30 CAPSULE, EXTENDED RELEASE ORAL EVERY MORNING
Qty: 45 CAPSULE | Refills: 0 | Status: SHIPPED | OUTPATIENT
Start: 2022-09-08 | End: 2022-10-23

## 2022-09-08 RX ORDER — DEXTROAMPHETAMINE SACCHARATE, AMPHETAMINE ASPARTATE MONOHYDRATE, DEXTROAMPHETAMINE SULFATE AND AMPHETAMINE SULFATE 7.5; 7.5; 7.5; 7.5 MG/1; MG/1; MG/1; MG/1
30 CAPSULE, EXTENDED RELEASE ORAL EVERY MORNING
Qty: 45 CAPSULE | Refills: 0 | Status: ON HOLD | OUTPATIENT
Start: 2022-09-08 | End: 2023-06-29 | Stop reason: CLARIF

## 2022-09-08 NOTE — PROGRESS NOTES
Outpatient Psychiatry Follow-Up Visit (MD/NP)    9/8/2022       The patient location is: Home in [place], [place]  The chief complaint leading to consultation is: hx of OCD, anxiety, & ADHD    Visit type: audiovisual    Face to Face time with patient: 15 minutes  25 minutes of total time spent on the encounter, which includes face to face time and non-face to face time preparing to see the patient (eg, review of tests), Obtaining and/or reviewing separately obtained history, Documenting clinical information in the electronic or other health record, Independently interpreting results (not separately reported) and communicating results to the patient/family/caregiver, or Care coordination (not separately reported).         Each patient to whom he or she provides medical services by telemedicine is:  (1) informed of the relationship between the physician and patient and the respective role of any other health care provider with respect to management of the patient; and (2) notified that he or she may decline to receive medical services by telemedicine and may withdraw from such care at any time.        Clinical Status of Patient:  Outpatient (Ambulatory)    Chief Complaint:  Bernarda Cordero is a 27 y.o. female who presented today for follow-up of anxiety, attention problems and OCD .  Patient's chart was reviewed. Met with patient.     Interval History and Content of Current Session:  Interim Events/Subjective Report/Content of Current Session:    Patient reported doing well since her last visit. 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 She stated she is followed by her neurologist for treatment and monitoring. She stated her baby girl is 2 years old now and is doing well. She is still in nurse practitioner school and is taking  "courses at [employer]. She is staying busy working full time while going to  school, and taking care of her baby.    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      She described her mood as "well" and her affect was mood congruent and appropriate. She reported overall improved motivation, anxiety and concentration. She continues to report managed ADHD symptoms with adderall xr 30 mg po qam and adderall IR 10 mg po qnoon prn. She stated she continues to take drug holiday days to avoid dependence on adderall. She denied side effects including but not limited to palpitations, SOB, chest pain, nausea, vomiting, irritability, anxiety, insomnia or GI upset.  She continues to report that her OCD an anxiety symptoms well controlled without medications. She denied SI/HI/VH/AH. She denied alcohol or drugs use. Checked  and no signs of misuse. There were no objective signs or symptoms of psychosis or ronn. She denied feeling depressed or hopeless. She denied symptom of low mood, fatigue, irritably, helplessness, worthlessness, or anhedonia.       Psychiatric Review Of Systems - Is patient experiencing or having changes in:  sleep: no  appetite: no  weight: no  energy/anergy: no  interest/pleasure/anhedonia: no  somatic symptoms: no  anxiety/panic: no  guilty/hopelessness: no  concentration: no  S.I.B.s/risky behavior: no  Irritability: no  Racing thoughts: no  Impulsive behaviors: no  Paranoia:no  AVH:no  Suicidal thoughts/plan/intent: no    Previous Medication trials:  Vistaril 15mg bid prn for anxiety or insomnia  Vyvanse 30 mg po daily ( effective but stopped it due to cost)  Concerta  Ritalin    Current Medications:  Adderall xl 30 mg po daily  Adderall ir 10 mg po qnoon prn  Melatonin 5 mg po qhs prn       Psychotherapy:  Target symptoms: lack of focus, OCD  Why chosen therapy is appropriate versus another modality: " evidence based practice  Outcome monitoring methods: self-report, observation  Therapeutic intervention type: insight oriented psychotherapy, behavior modifying psychotherapy, supportive psychotherapy  Topics discussed/themes: building skills sets for symptom management, symptom recognition  The patient's response to the intervention is motivated. The patient's progress toward treatment goals is good.   Duration of intervention:5 minutes.    Review of Systems   PSYCHIATRIC: Pertinant items are noted in the narrative.  CONSTITUTIONAL: No weight gain or loss.   MUSCULOSKELETAL: No pain or stiffness of the joints.  NEUROLOGIC: No weakness, sensory changes, seizures, confusion, memory loss, tremor or other abnormal movements.  ENDOCRINE: No polydipsia or polyuria.  INTEGUMENTARY: No rashes or lacerations.  EYES: No exophthalmos, jaundice or blindness.  ENT: No dizziness, tinnitus or hearing loss.  RESPIRATORY: No shortness of breath.  CARDIOVASCULAR: No tachycardia or chest pain.  GASTROINTESTINAL: No nausea, vomiting, pain, constipation or diarrhea.  GENITOURINARY: No frequency, dysuria or sexual dysfunction.  HEMATOLOGIC/LYMPHATIC: No excessive bleeding, prolonged or excessive bleeding after dental extraction/injury.  ALLERGIC/IMMUNOLOGIC: No allergic response to materials, foods or animals at this time.    Past Medical, Family and Social History: The patient's past medical, family and social history have been reviewed and updated as appropriate within the electronic medical record - see encounter notes.    Compliance: yes    Side effects: None    Risk Parameters:  Patient reports no suicidal ideation  Patient reports no homicidal ideation  Patient reports no self-injurious behavior  Patient reports no violent behavior    Exam (detailed: at least 9 elements; comprehensive: all 15 elements)   Constitutional  Vitals:  Most recent vital signs, dated greater than 90 days prior to this appointment, were reviewed.   There  "were no vitals filed for this visit.     General:  unremarkable, age appropriate     Musculoskeletal  Muscle Strength/Tone:  no dystonia, no tremor, no tic   Gait & Station:  non-ataxic     Psychiatric  Speech:  no latency; no press   Mood & Affect:  "well  mood-congruent   Thought Process:  logical   Associations:  intact   Thought Content:  normal, no suicidality, no homicidality, delusions, or paranoia   Insight:  intact   Judgement: behavior is adequate to circumstances   Orientation:  grossly intact   Memory: intact for content of interview   Language: grossly intact   Attention Span & Concentration:  able to focus   Fund of Knowledge:  intact and appropriate to age and level of education     Assessment and Diagnosis   Status/Progress: Based on the examination today, the patient's problem(s) is/are improved.  New problems have not been presented today.   Co-morbidities are not complicating management of the primary condition.  There are no active rule-out diagnoses for this patient at this time.     General Impression: Doing well        ICD-10-CM ICD-9-CM   1. Attention deficit hyperactivity disorder (ADHD), predominantly inattentive type  F90.0 314.00   2. Anxiety disorder, unspecified type  F41.9 300.00         Intervention/Counseling/Treatment Plan   Medication Management: Continue current medications. The risks and benefits of medication were discussed with the patient.   Continue PRN Adderall IR 10mg for ADHD  Continue Adderall XR 30 mg po daily for ADHD   Continue Melatonin 5mg PRN for shift work sleep problems  Encouraged patient to go to the office to get vitals ASAP.   Continue utilization of effective CBT skills, positive self-talk, cognitive reframing, meditation, mindfulness, deep breathing, and relaxations skills.  Labs: reviewed most recent.   The treatment plan and follow up plan were reviewed with the patient.  Discussed with patient informed consent, risks vs. benefits, alternative treatments, " side effect profile and the inherent unpredictability of individual responses to these treatments. The patient expresses understanding of the above and displays the capacity to agree with this current plan and had no other questions.  Encouraged Patient to keep future appointments.   Take medications as prescribed    In the event of an emergency patient was advised to go to the emergency room.        Return to Clinic: 3 months or earlier as needed    VICTOR HUGO Lopez-BC

## 2022-09-09 ENCOUNTER — PATIENT MESSAGE (OUTPATIENT)
Dept: PSYCHIATRY | Facility: CLINIC | Age: 27
End: 2022-09-09
Payer: COMMERCIAL

## 2022-09-27 ENCOUNTER — PATIENT MESSAGE (OUTPATIENT)
Dept: PSYCHIATRY | Facility: CLINIC | Age: 27
End: 2022-09-27
Payer: COMMERCIAL

## 2023-01-11 ENCOUNTER — TELEPHONE (OUTPATIENT)
Dept: SURGERY | Facility: CLINIC | Age: 28
End: 2023-01-11
Payer: COMMERCIAL

## 2023-01-11 NOTE — TELEPHONE ENCOUNTER
----- Message from Manuela Swanson sent at 1/11/2023  9:09 AM CST -----  Contact: pt  Pt requesting a callback to get info on the type of procedures that the clinic perform             Confirmed contact below:  Contact Name:Bernarda Cordero  Phone Number: [phone]

## 2023-01-19 ENCOUNTER — TELEPHONE (OUTPATIENT)
Dept: NEUROSURGERY | Facility: CLINIC | Age: 28
End: 2023-01-19
Payer: COMMERCIAL

## 2023-01-19 NOTE — TELEPHONE ENCOUNTER
Called pt back. Hx of macroadenoma - touching chiasm but HVF normal. Elevated prolactin but only in 50's. On cabergoline twice/week.    Had labs done today.  Has last MRI on disc.  Has HVF.  Will drop all off here next week.  Appts made for 2/14.    ----- Message from Cristela Garner sent at 1/19/2023  3:22 PM CST -----  Regarding: Appt  Contact: Pt [phone]  Patient requesting a call back in regards to scheduling a new patient appt She needs to know where to drop disk of MRI Please call to discuss further

## 2023-02-08 ENCOUNTER — TELEPHONE (OUTPATIENT)
Dept: NEUROSURGERY | Facility: CLINIC | Age: 28
End: 2023-02-08
Payer: COMMERCIAL

## 2023-02-08 NOTE — TELEPHONE ENCOUNTER
Returned pt's call  Explained we don't do anything w/ insurance. Gave her # for preservice.    ----- Message from Barb Branch sent at 2/8/2023  2:26 PM CST -----  Regarding: PA  Contact: [phone]  Pt calling to speak with Irma in regards to PA for appt on 02/14.   Please call and adv @ [phone]

## 2023-02-09 ENCOUNTER — PATIENT MESSAGE (OUTPATIENT)
Dept: NEUROSURGERY | Facility: CLINIC | Age: 28
End: 2023-02-09
Payer: COMMERCIAL

## 2023-02-13 ENCOUNTER — PATIENT MESSAGE (OUTPATIENT)
Dept: NEUROSURGERY | Facility: CLINIC | Age: 28
End: 2023-02-13
Payer: COMMERCIAL

## 2023-02-13 ENCOUNTER — TELEPHONE (OUTPATIENT)
Dept: NEUROSURGERY | Facility: CLINIC | Age: 28
End: 2023-02-13
Payer: COMMERCIAL

## 2023-02-13 NOTE — TELEPHONE ENCOUNTER
----- Message from Jo Ann Flores MA sent at 2/13/2023 10:24 AM CST -----  Contact: [phone]  Pt would like for Mary to give her a callback to reschedule her appt tomorrow. She states she is not 100% sure that her insurance would cover her visit, but she is still waiting on a phone call from her insurance. Please reach out to pt at [phone], and to leave a voicemail. Pt also states in case she misses phone call to contact her through the patient  portal.

## 2023-02-20 ENCOUNTER — TELEPHONE (OUTPATIENT)
Dept: PSYCHIATRY | Facility: CLINIC | Age: 28
End: 2023-02-20
Payer: COMMERCIAL

## 2023-02-21 ENCOUNTER — PATIENT MESSAGE (OUTPATIENT)
Dept: PSYCHIATRY | Facility: CLINIC | Age: 28
End: 2023-02-21
Payer: COMMERCIAL

## 2023-03-03 ENCOUNTER — PATIENT MESSAGE (OUTPATIENT)
Dept: NEUROSURGERY | Facility: CLINIC | Age: 28
End: 2023-03-03
Payer: COMMERCIAL

## 2023-04-03 ENCOUNTER — PATIENT MESSAGE (OUTPATIENT)
Dept: NEUROSURGERY | Facility: CLINIC | Age: 28
End: 2023-04-03
Payer: COMMERCIAL

## 2023-04-03 NOTE — PROGRESS NOTES
"PITUITARY CLIN ENDOCRINOLOGY INITIAL VISIT  04/03/2023     The patient was seen in the multidisciplinary pituitary clinic with Dr. Nevarez today.     Subjective:      Reason for referal: referred by Hilario Nevarez MD for evaluation and management of cystic pituitary lesion    HPI:   Bernarda Cordero is a 28 y.o. female who presents for evaluation of cystic sellar mass with elevated prolactin levels.      Bernarda is followed by endocrinology through [facility], Dr. Huerta.  Established with Dr. Huerta 11/11/2021 after pituitary mass found in workup for amenorrhea.  Most recent visit on 01/17/2023.  On Cabergoline since June 2022 with a 2 month pause in therapy (11/22-1/23) due to palpitations/tachycardia prompting ER visit with HR in the 130's.  Her echo, stress test, and Holter monitor were not concerning so in January cabergoline was restarted.          Initial presentation:   Worsening galactorrhea after pregnancy despite cessation of breast feeding (delivered 6/30/2020).  Imaging with 1.4 cm cystic pituitary lesion (peak prolactin 53 with normal TSH, IGF-1)    Imaging:    Showed pituitary lesion as below    MRI Pituitary: 11/01/2021  Cystic and solid mass in the pituitary gland with its epicenter in the right side of the gland 1.4 x 1.2 x 1.2 cm and displaces the rest of the gland parenchyma to the left as well as the infundibulum, which enhances normally, is normal in diameter, and extends toward the hypothalamic region, which is unremarkable. This also exerts some mass effect on the most distal chiasm and proximal post chiasmatic optic nerves."  Impression: "1.4 x 1.2 x 1.2 cm primarily cystic mass arising right sided pituitary gland is most likely a cystic or necrotic pituitary microadenoma given the history    Most Recent Imaging:  MRI Pituitary: 05/17/2022  The pituitary gland appears to be enlarged measuring 1.1 cm in craniocaudal dimension and abutting the optic chiasm. Questionable mass effect upon the optic " chiasm is identified. The gland is heterogeneous and demonstrates a T2 hyperintense lesion which is eccentric to the right without evidence for invasion into the optic chiasm. The cystic component is T1 hypointense and demonstrates no significant enhancement. The craniocaudal dimension of the cyst is 0.9 cm.     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 STARTED CABERGOLINE.  Aug 2022 prl: 2.0  Dec 2022 prl: 9.0 and stopped her cabergoline after a Nov 2022 ER visit for palpitations  Jan 2023  prl: 31.7 Cabergoline restarted  March 2023  prl: 2.1    Continues to take Cabergoline 0.25 mg Thur and Sun.      Current symptoms:    Hyperprolactinemia:  [x]  breast tenderness (left sided tenderness) []  nipple discharge [x]  Menstrual Irregularity (heavy cycles and longer timeframe between) []  Denies     No results found for: PROLACTIN    Thyroid:  [x]  fatigue []  weight change []  temp intolerance   []  Denies    [x]  BM change []  skin/hair change [x] Palpitations (resolved since being on Metoprolol) []  tremor []  Denies    Lab Results   Component Value Date    TSH 1.65 10/03/2008             Growth Hormone Excess:  []  increase hand/foot size []  teeth spacing change    [x]  Denies    []  worse glycemic control []  joint pain     [x]  Denies    Last IGF-1:   No results found for: SOMATMDN         Cushing's syndrome:   []  Easy bruising [x]  Weight gain (gain over the years) []  Worse glycemic control   []  HTN  []  Acne  [x]  Hirsutism (face, abdomen)  [x]  Striae  [x]  Menstrual change []  VTE   [x]  Fractures (trauma as a child  - left arm)  []  Denies All    Denies symptoms of adrenal insufficiency (no lightheadedness, N/V/abd pain, hypotension).    Prior DST from November 2021 with normal response    No results found for: ACTH        Gonadotrophs:     [x]  Irregular menses []  Postmenopausal  []  Decreased libido   []  ED   []  Denies    No results found for: LH, FSH        Menarche: 11 years of age  Repro hx: 1 child, birth June 2021  LMP: 3/13/23    DI:   [x]  Polyuria  [x]  Polydipsia  [x]  Nocturia (2-x)   []  Denies      Current Outpatient Medications:     albuterol (PROAIR HFA) 90 mcg/actuation inhaler, as needed., Disp: , Rfl:     bepotastine besilate (BEPREVE) 1.5 % Drop, Place 1 drop into both eyes once daily., Disp: 10 mL, Rfl: 11    BEPREVE 1.5 % Drop, Place 1 drop into both eyes 2 (two) times daily., Disp: 5 mL, Rfl: 6    dextroamphetamine-amphetamine (ADDERALL XR) 30 MG 24 hr capsule, Take 1 capsule (30 mg total) by mouth every morning., Disp: 45 capsule, Rfl: 0    diazePAM (VALIUM) 2 MG tablet, Take 1 tablet (2 mg total) by mouth once as needed for Anxiety., Disp: 1 tablet, Rfl: 0    HYDROcodone-acetaminophen (NORCO) 5-325 mg per tablet, hydrocodone 5 mg-acetaminophen 325 mg tablet, Disp: , Rfl:     hypochlorous acid-sodium chlor (AVENOVA) 0.01 % Spry, Avenova 0.01 % topical spray, Disp: , Rfl:     ibuprofen (ADVIL,MOTRIN) 100 mg/5 mL suspension, ibuprofen 800 mg tablet, Disp: , Rfl:     ROS:  see HPI    Objective:   Physical Exam   There were no vitals taken for this visit.  Wt Readings from Last 3 Encounters:   06/01/17 89.8 kg (198 lb)   02/19/16 86.6 kg (191 lb)   05/09/11 73.9 kg (162 lb 13.3 oz) (92 %, Z= 1.39)*     * Growth percentiles are based on CDC (Girls, 2-20 Years) data.   ]    Constitutional:  Pleasant,  in no acute distress.   HENT:   Head:    Normocephalic and atraumatic.   Eyes:    EOMI. No scleral icterus. Visual fields intact to confrontation  Neck:    No thyromegaly or palpable thyroid nodules, no  cervical LAD, no prominent DC or SC fat pads  Cardiovascular:  Normal rate, regular rhythm, 2+ radial pulses blx   Respiratory:   Effort normal   Gastrointestinal: Soft, nontender  Neurological:  No tremor, normal speech  Skin:    Skin is warm, dry, striae (flesh colored)  Psych:   Normal mood and affect.   Extremity:  No edema or wounds, no deformity     LABORATORY REVIEW:    Chemistry        Component Value Date/Time     02/01/2017 1600    K 4.0 02/01/2017 1600     02/01/2017 1600    CO2 27 02/01/2017 1600    BUN 17 02/01/2017 1600    CREATININE 1.0 02/01/2017 1600    GLU 82 02/01/2017 1600        Component Value Date/Time    CALCIUM 9.0 02/01/2017 1600    ALKPHOS 56 02/01/2017 1600    AST 15 02/01/2017 1600    ALT 15 02/01/2017 1600    BILITOT 0.3 02/01/2017 1600    ESTGFRAFRICA >60.0 02/01/2017 1600    EGFRNONAA >60.0 02/01/2017 1600        Pituitary MRI 11/2021        Pituitary MRI 5/17/22      Pituitary MRI: 03/20/2023        Assessment/Plan:     Problem List Items Addressed This Visit          Cardiac/Vascular    History of sinus tachycardia     Prior concerns for sinus tachycardia prompting discontinuation of Cabergoline in Nov/Dec 2022.  Has since been restarted on Cabergoline and also metoprolol without concerns.  Workup for tachycardia unremarkable.  HR normal today on exam.                Endocrine    Pituitary adenoma - Primary     Review of pituitary imaging from outside source shows continued appearance of cystic appearing pituitary mass without any size change despite being on cabergoline therapy.  The prolactin elevations that had been seen prior are likely a component of stalk effect and therefore no significant size change seen on treatment.      Dr. Nevarez to evaluate today and discuss surgical options but likely would be a good candidate for TSR as she may in the future have more children which would put this pituitary mass at further risk for optic nerve compression.      Repeat HVF  with neuro-ophthalmology now.  Prior lab testing completed with Dr. Huerta reviewed with no concerns noted on those studies.  Will need follow up after TSR in the pituitary clinic 2 weeks post-op.  Risks of pituitary hormone dysfunction after surgery were discussed as well.              Relevant Orders    Ambulatory referral/consult to Ophthalmology       Return to clinic 2 weeks post-op from TSR     [facility], 6th Floor  [address], [place], [address]    Office: [phone]  Fax: [phone]    Disclaimer: This note has been generated using voice-recognition software. There may be typographical errors that have been missed during proof-reading.    The above history labs imaging impression and plan were discussed with attending physician who is in agreement and also took part in this patient's care.  I personally reviewed all of the patients available medications, labs, imaging, vitals, allergies, medical history.

## 2023-04-04 ENCOUNTER — OFFICE VISIT (OUTPATIENT)
Dept: ENDOCRINOLOGY | Facility: CLINIC | Age: 28
End: 2023-04-04
Payer: COMMERCIAL

## 2023-04-04 ENCOUNTER — TELEPHONE (OUTPATIENT)
Dept: NEUROSURGERY | Facility: CLINIC | Age: 28
End: 2023-04-04

## 2023-04-04 ENCOUNTER — OFFICE VISIT (OUTPATIENT)
Dept: NEUROSURGERY | Facility: CLINIC | Age: 28
End: 2023-04-04
Payer: COMMERCIAL

## 2023-04-04 VITALS
BODY MASS INDEX: 37.92 KG/M2 | SYSTOLIC BLOOD PRESSURE: 104 MMHG | OXYGEN SATURATION: 99 % | DIASTOLIC BLOOD PRESSURE: 68 MMHG | RESPIRATION RATE: 16 BRPM | HEART RATE: 87 BPM | WEIGHT: 214.06 LBS

## 2023-04-04 VITALS
HEART RATE: 87 BPM | SYSTOLIC BLOOD PRESSURE: 104 MMHG | BODY MASS INDEX: 37.93 KG/M2 | DIASTOLIC BLOOD PRESSURE: 68 MMHG | WEIGHT: 214.06 LBS | HEIGHT: 63 IN

## 2023-04-04 DIAGNOSIS — D35.2 PITUITARY ADENOMA: Primary | ICD-10-CM

## 2023-04-04 DIAGNOSIS — Z86.79 HISTORY OF SINUS TACHYCARDIA: ICD-10-CM

## 2023-04-04 PROCEDURE — 99999 PR PBB SHADOW E&M-EST. PATIENT-LVL IV: CPT | Mod: PBBFAC,,, | Performed by: INTERNAL MEDICINE

## 2023-04-04 PROCEDURE — 3074F PR MOST RECENT SYSTOLIC BLOOD PRESSURE < 130 MM HG: ICD-10-PCS | Mod: CPTII,S$GLB,, | Performed by: INTERNAL MEDICINE

## 2023-04-04 PROCEDURE — 3078F PR MOST RECENT DIASTOLIC BLOOD PRESSURE < 80 MM HG: ICD-10-PCS | Mod: CPTII,S$GLB,, | Performed by: NEUROLOGICAL SURGERY

## 2023-04-04 PROCEDURE — 3008F PR BODY MASS INDEX (BMI) DOCUMENTED: ICD-10-PCS | Mod: CPTII,S$GLB,, | Performed by: NEUROLOGICAL SURGERY

## 2023-04-04 PROCEDURE — 3008F BODY MASS INDEX DOCD: CPT | Mod: CPTII,S$GLB,, | Performed by: NEUROLOGICAL SURGERY

## 2023-04-04 PROCEDURE — 3008F PR BODY MASS INDEX (BMI) DOCUMENTED: ICD-10-PCS | Mod: CPTII,S$GLB,, | Performed by: INTERNAL MEDICINE

## 2023-04-04 PROCEDURE — 3078F DIAST BP <80 MM HG: CPT | Mod: CPTII,S$GLB,, | Performed by: INTERNAL MEDICINE

## 2023-04-04 PROCEDURE — 3008F BODY MASS INDEX DOCD: CPT | Mod: CPTII,S$GLB,, | Performed by: INTERNAL MEDICINE

## 2023-04-04 PROCEDURE — 3078F DIAST BP <80 MM HG: CPT | Mod: CPTII,S$GLB,, | Performed by: NEUROLOGICAL SURGERY

## 2023-04-04 PROCEDURE — 99999 PR PBB SHADOW E&M-EST. PATIENT-LVL IV: ICD-10-PCS | Mod: PBBFAC,,, | Performed by: INTERNAL MEDICINE

## 2023-04-04 PROCEDURE — 3078F PR MOST RECENT DIASTOLIC BLOOD PRESSURE < 80 MM HG: ICD-10-PCS | Mod: CPTII,S$GLB,, | Performed by: INTERNAL MEDICINE

## 2023-04-04 PROCEDURE — 1159F PR MEDICATION LIST DOCUMENTED IN MEDICAL RECORD: ICD-10-PCS | Mod: CPTII,S$GLB,, | Performed by: NEUROLOGICAL SURGERY

## 2023-04-04 PROCEDURE — 3074F SYST BP LT 130 MM HG: CPT | Mod: CPTII,S$GLB,, | Performed by: INTERNAL MEDICINE

## 2023-04-04 PROCEDURE — 99999 PR PBB SHADOW E&M-EST. PATIENT-LVL III: ICD-10-PCS | Mod: PBBFAC,,, | Performed by: NEUROLOGICAL SURGERY

## 2023-04-04 PROCEDURE — 99204 OFFICE O/P NEW MOD 45 MIN: CPT | Mod: S$GLB,,, | Performed by: INTERNAL MEDICINE

## 2023-04-04 PROCEDURE — 3074F PR MOST RECENT SYSTOLIC BLOOD PRESSURE < 130 MM HG: ICD-10-PCS | Mod: CPTII,S$GLB,, | Performed by: NEUROLOGICAL SURGERY

## 2023-04-04 PROCEDURE — 1159F MED LIST DOCD IN RCRD: CPT | Mod: CPTII,S$GLB,, | Performed by: NEUROLOGICAL SURGERY

## 2023-04-04 PROCEDURE — 99204 PR OFFICE/OUTPT VISIT, NEW, LEVL IV, 45-59 MIN: ICD-10-PCS | Mod: S$GLB,,, | Performed by: NEUROLOGICAL SURGERY

## 2023-04-04 PROCEDURE — 99999 PR PBB SHADOW E&M-EST. PATIENT-LVL III: CPT | Mod: PBBFAC,,, | Performed by: NEUROLOGICAL SURGERY

## 2023-04-04 PROCEDURE — 99204 OFFICE O/P NEW MOD 45 MIN: CPT | Mod: S$GLB,,, | Performed by: NEUROLOGICAL SURGERY

## 2023-04-04 PROCEDURE — 1160F PR REVIEW ALL MEDS BY PRESCRIBER/CLIN PHARMACIST DOCUMENTED: ICD-10-PCS | Mod: CPTII,S$GLB,, | Performed by: NEUROLOGICAL SURGERY

## 2023-04-04 PROCEDURE — 3074F SYST BP LT 130 MM HG: CPT | Mod: CPTII,S$GLB,, | Performed by: NEUROLOGICAL SURGERY

## 2023-04-04 PROCEDURE — 1160F RVW MEDS BY RX/DR IN RCRD: CPT | Mod: CPTII,S$GLB,, | Performed by: NEUROLOGICAL SURGERY

## 2023-04-04 PROCEDURE — 99204 PR OFFICE/OUTPT VISIT, NEW, LEVL IV, 45-59 MIN: ICD-10-PCS | Mod: S$GLB,,, | Performed by: INTERNAL MEDICINE

## 2023-04-04 RX ORDER — OLOPATADINE HYDROCHLORIDE 1 MG/ML
1 SOLUTION/ DROPS OPHTHALMIC 2 TIMES DAILY
COMMUNITY
End: 2023-06-07 | Stop reason: CLARIF

## 2023-04-04 RX ORDER — ERGOCALCIFEROL 1.25 MG/1
50000 CAPSULE ORAL
COMMUNITY

## 2023-04-04 RX ORDER — CABERGOLINE 0.5 MG/1
0.25 TABLET ORAL
Status: ON HOLD | COMMUNITY
Start: 2023-02-07 | End: 2023-06-30 | Stop reason: HOSPADM

## 2023-04-04 RX ORDER — METOPROLOL SUCCINATE 25 MG/1
25 TABLET, EXTENDED RELEASE ORAL NIGHTLY
Status: ON HOLD | COMMUNITY
Start: 2022-11-23 | End: 2023-06-30 | Stop reason: HOSPADM

## 2023-04-04 RX ORDER — DEXAMETHASONE 1 MG/1
1 TABLET ORAL ONCE
Qty: 1 TABLET | Refills: 0 | Status: CANCELLED | OUTPATIENT
Start: 2023-04-04 | End: 2023-04-04

## 2023-04-04 RX ORDER — CYCLOSPORINE 0.5 MG/ML
1 EMULSION OPHTHALMIC 2 TIMES DAILY
COMMUNITY
Start: 2022-12-12

## 2023-04-04 NOTE — PROGRESS NOTES
Subjective:   I, Rochelle Goodwin, attest that this documentation has been prepared under the direction and in the presence of Hilario Nevarez MD.     Patient ID: Bernarda Cordero is a 28 y.o. female     Chief Complaint: No chief complaint on file.      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 Huerta, endocrinology, who presents today to establish care. This is a patient who initially presented with worsening galactorrhea after her first pregnancy. She was found to have 1.4 cm cystic pituitary lesion during her workup. Denies vision compromises during that time. She attempted cabergoline with routine monitoring at which the lesion remained stable. HVF done in 2022 WNL. Upon evaluation today, the pt continues to take cabergoline. She has mildly irregular cycles, but no further lactation. Prolactin mildly elevated.    Review of Systems   Constitutional:  Negative for activity change, appetite change, fatigue, fever and unexpected weight change.   HENT:  Negative for facial swelling.    Eyes: Negative.    Respiratory: Negative.     Cardiovascular: Negative.    Gastrointestinal:  Negative for diarrhea, nausea and vomiting.   Endocrine: Negative.    Genitourinary: Negative.    Musculoskeletal:  Negative for back pain, joint swelling, myalgias and neck pain.   Neurological:  Negative for dizziness, seizures, weakness, numbness and headaches.   Psychiatric/Behavioral: Negative.        Past Medical History:   Diagnosis Date    ADD (attention deficit disorder)     ADHD (attention deficit hyperactivity disorder)     Anxiety     Hx of psychiatric care     Obesity     Psychiatric exam [facility] by [facility]     Psychiatric problem     Sleep difficulties     Therapy        Objective:      Vitals:    04/04/23 0959   BP: 104/68   Pulse: 87      Physical Exam  Constitutional:       General: She is not in acute distress.     Appearance: Normal appearance.   HENT:      Head: Normocephalic and  atraumatic.   Pulmonary:      Effort: Pulmonary effort is normal.   Musculoskeletal:      Cervical back: Neck supple.   Neurological:      Mental Status: She is alert and oriented to person, place, and time.      GCS: GCS eye subscore is 4. GCS verbal subscore is 5. GCS motor subscore is 6.      Cranial Nerves: No cranial nerve deficit.       IMAGING:  MRI Pituitary W WO Contrast (3/20/2023):  1. No significant change in the 1.4 x 1.2 x 1.2 cm primarily cystic mass arising right sided pituitary gland is most likely a cystic or necrotic pituitary microadenoma given the history.   2. Several nonenhancing abnormal signal foci all less than 5 mm in diameter are present in in the bifrontal and posterior left temporal white matter and are unchanged, but one in the right frontal subcortical white matter is slightly increased in size. These foci are nonspecific, but given the patient's age and sex, differential considerations include focal gliosis, migraine headaches occurring at time of exam, demyelinating processes, white matter microvascular ischemic changes, Lyme disease, and vasculitides.   3. Acute bilateral ethmoid sinusitis superimposed on scattered bilateral chronic sinus disease.       I have personally reviewed the images with the pt.      I, Dr. Hilario Nevarez, personally performed the services described in this documentation. All medical record entries made by the scribe, Rochelle Goodwin, were at my direction and in my presence.  I have reviewed the chart and agree that the record reflects my personal performance and is accurate and complete. Hilario Nevarez MD. 04/04/2023    Assessment:       Pituitary tumor     Plan:   I have personally reviewed the MRI pituitary with the pt which shows:  1. No significant change in the 1.4 x 1.2 x 1.2 cm primarily cystic mass arising right sided pituitary gland is most likely a cystic or necrotic pituitary microadenoma given the history.   2. Several nonenhancing abnormal signal  foci all less than 5 mm in diameter are present in in the bifrontal and posterior left temporal white matter and are unchanged, but one in the right frontal subcortical white matter is slightly increased in size. These foci are nonspecific, but given the patient's age and sex, differential considerations include focal gliosis, migraine headaches occurring at time of exam, demyelinating processes, white matter microvascular ischemic changes, Lyme disease, and vasculitides.   3. Acute bilateral ethmoid sinusitis superimposed on scattered bilateral chronic sinus disease.     I recommend TSR. I have discussed the risks/benefits, indications, and alternatives for the proposed procedure in detail. I have answered all of their questions and patient wish to proceed with surgery. We will schedule patient.    No

## 2023-04-04 NOTE — PATIENT INSTRUCTIONS
I have personally reviewed the MRI pituitary with the pt which shows:  1. No significant change in the 1.4 x 1.2 x 1.2 cm primarily cystic mass arising right sided pituitary gland is most likely a cystic or necrotic pituitary microadenoma given the history.   2. Several nonenhancing abnormal signal foci all less than 5 mm in diameter are present in in the bifrontal and posterior left temporal white matter and are unchanged, but one in the right frontal subcortical white matter is slightly increased in size. These foci are nonspecific, but given the patient's age and sex, differential considerations include focal gliosis, migraine headaches occurring at time of exam, demyelinating processes, white matter microvascular ischemic changes, Lyme disease, and vasculitides.   3. Acute bilateral ethmoid sinusitis superimposed on scattered bilateral chronic sinus disease.     I recommend TSR. I have discussed the risks/benefits, indications, and alternatives for the proposed procedure in detail. I have answered all of their questions and patient wish to proceed with surgery. We will schedule patient.

## 2023-04-04 NOTE — ASSESSMENT & PLAN NOTE
Review of pituitary imaging from outside source shows continued appearance of cystic appearing pituitary mass without any size change despite being on cabergoline therapy.  The prolactin elevations that had been seen prior are likely a component of stalk effect and therefore no significant size change seen on treatment.      Dr. Nevarez to evaluate today and discuss surgical options but likely would be a good candidate for TSR as she may in the future have more children which would put this pituitary mass at further risk for optic nerve compression.      Repeat HVF with neuro-ophthalmology now.  Prior lab testing completed with Dr. Huerta reviewed with no concerns noted on those studies.  Will need follow up after TSR in the pituitary clinic 2 weeks post-op.  Risks of pituitary hormone dysfunction after surgery were discussed as well.

## 2023-04-04 NOTE — ASSESSMENT & PLAN NOTE
Prior concerns for sinus tachycardia prompting discontinuation of Cabergoline in Nov/Dec 2022.  Has since been restarted on Cabergoline and also metoprolol without concerns.  Workup for tachycardia unremarkable.  HR normal today on exam.

## 2023-04-04 NOTE — PATIENT INSTRUCTIONS
We would like for you to have repeat visual field testing and we will send a referral to them to evaluate you again.  If vision changes seen on peripheral testing then this would be reason to pursue surgery.  Review of your prior labs have shown that you do not have excess production of hormones or under production.  If surgery is pursued there is the small possibility that some of the hormones produced from the pituitary gland can be affected and you may need replacement in the future.

## 2023-04-04 NOTE — PROGRESS NOTES
I have reviewed and concur with Dr. Geovany Vuong's history, physical, assessment, and plan.  I have personally interviewed and examined the patient.  See below addendum for my evaluation and additional findings.    Most recent pituitary MRI reviewed showing cystic lesion in the right aspect of the pituitary gland with normal pituitary tissue in the left aspect.  The pituitary stalk is deviated towards the left.  There is some suprasellar extension and the lesion approaches the optic chiasm.      Suspect that this is a nonfunctional cystic adenoma verses less likely cystic prolactinoma given size and only minimal prolactin elevation.  There has not been a significant decrease in size on cabergoline.  Reviewed recommendations for surgery versus ongoing monitoring.  We also discussed that if she were to become pregnant again there is a potential for normal hypertrophy the pituitary gland that could push the lesion upward and compress the optic chiasm resulting in vision change.  She is not sure if she will be seeking additional pregnancies in the future.  She would like to consider surgery and will be discussing this today with Dr. Nevarez.  If planning to undergo surgery, okay to stop cabergoline now.  Will need to update visual field testing for new baseline, referred to Neuro-Ophthalmology.    Herbie Abbott MD         Perioperative management recommendations:  - recommend inpatient endocrine consult on admission for monitoring for adrenal insufficiency, diabetes insipidus, and coordination of outpatient follow-up.  The inpatient endocrine service is not typically write orders for neuro surgery patient however we will communicate our recommendation.  Please refer to Inpatient transsphenoidal resection protocol guide for further details.  - Avoid routine use of perioperative glucocorticoid (dexamethasone, hydrocortisone, methylprednisolone, etc) administration.  - check daily 8:00 a.m. cortisol levels and if  cortisol drops to less than 8 start oral hydrocortisone 20/10 mg which patient should be discharged on   - Do not check cortisol levels if the patient has received any glucocorticoids in the past 24 hours  - Monitor for DI with strict ins and outs q1H and if urine output is >250 cc x2 hours draw BMP, Serum osm, urine osm, urine Na and UA/SG stat and notify primary team and endocrine on call  - Avoid inpatient ordering of LH, FSH, testosterone, estrogen, progesterone, TSH, free T4 as these are unlikely to be helpful in making clinical decisions during hospital admission.  - recommended inpatient labs:    - after discharge patient will need postop day 7 BMP to screen for development of SIADH.  - inpatient endocrine service to determine if additional endocrine labs need to be obtained prior to 2 week follow-up visit with me in multidisciplinary pituitary clinic.

## 2023-04-13 ENCOUNTER — PATIENT MESSAGE (OUTPATIENT)
Dept: NEUROSURGERY | Facility: CLINIC | Age: 28
End: 2023-04-13
Payer: COMMERCIAL

## 2023-05-01 ENCOUNTER — PATIENT MESSAGE (OUTPATIENT)
Dept: NEUROSURGERY | Facility: CLINIC | Age: 28
End: 2023-05-01
Payer: COMMERCIAL

## 2023-05-10 ENCOUNTER — PATIENT MESSAGE (OUTPATIENT)
Dept: NEUROSURGERY | Facility: CLINIC | Age: 28
End: 2023-05-10
Payer: COMMERCIAL

## 2023-06-01 ENCOUNTER — PATIENT MESSAGE (OUTPATIENT)
Dept: NEUROSURGERY | Facility: CLINIC | Age: 28
End: 2023-06-01
Payer: COMMERCIAL

## 2023-06-02 ENCOUNTER — TELEPHONE (OUTPATIENT)
Dept: NEUROSURGERY | Facility: CLINIC | Age: 28
End: 2023-06-02
Payer: COMMERCIAL

## 2023-06-02 NOTE — TELEPHONE ENCOUNTER
Called pt back. Got auth for TSR here even with [facility] insurance.    She will check with [employer] to see if they can do Stealth compatible CT.    Tentative date: June 29,

## 2023-06-05 ENCOUNTER — PATIENT MESSAGE (OUTPATIENT)
Dept: NEUROSURGERY | Facility: CLINIC | Age: 28
End: 2023-06-05
Payer: COMMERCIAL

## 2023-06-05 ENCOUNTER — TELEPHONE (OUTPATIENT)
Dept: NEUROSURGERY | Facility: CLINIC | Age: 28
End: 2023-06-05
Payer: COMMERCIAL

## 2023-06-05 DIAGNOSIS — D35.2 PITUITARY ADENOMA: Primary | ICD-10-CM

## 2023-06-07 ENCOUNTER — TELEPHONE (OUTPATIENT)
Dept: PREADMISSION TESTING | Facility: HOSPITAL | Age: 28
End: 2023-06-07

## 2023-06-07 DIAGNOSIS — Z01.818 PREOPERATIVE TESTING: Primary | ICD-10-CM

## 2023-06-07 RX ORDER — LIFITEGRAST 50 MG/ML
SOLUTION/ DROPS OPHTHALMIC 2 TIMES DAILY
COMMUNITY

## 2023-06-07 NOTE — ANESTHESIA PAT ROS NOTE
06/07/2023  Bernarda Cordero is a 28 y.o., female.      Pre-op Assessment    I have reviewed the Patient Summary Reports.     I have reviewed the Nursing Notes. I have reviewed the NPO Status.   I have reviewed the Medications.     Review of Systems  Anesthesia Hx:  No problems with previous Anesthesia             Denies Family Hx of Anesthesia complications.    Denies Personal Hx of Anesthesia complications.                    Social:  Non-Smoker, No Alcohol Use       Hematology/Oncology:  Hematology Normal   Oncology Normal                                   EENT/Dental:  chronic allergic rhinitis  Eyes:              Crosslinking          Cardiovascular:  Exercise tolerance: good   Denies Pacemaker.  Denies Hypertension.      Angina, at rest    denies PVD no hyperlipidemia  Denies MADDOX.   Sinus tachycardia---Metropolol Functional Capacity 4 METS                         Pulmonary:    Asthma asymptomatic  Denies Shortness of breath.  Denies Recent URI.  Denies Sleep Apnea. 8/2021 COVID+---RESOLVED               Renal/:   renal calculi               Hepatic/GI:        2012 APPENDECTOMY          Musculoskeletal:   Musculoskeletal General/Symptoms:   HEALED LEFT WRIST FRACTURE           Neurological:      Headaches Denies Seizures.                                Endocrine:  Denies Diabetes. Denies Hypothyroidism.  Denies Hyperthyroidism.     Pituitary Disease, Pituitary Tumor  Pituitary adenoma  Denies Obesity / BMI > 30  Psych:  Psychiatric History anxiety  ADHD (attention deficit hyperactivity disorder), combined type--DC MEDS since 11/2022 r/t tachycardia             Physical Exam  General: Well nourished, Cooperative, Alert and Oriented    Airway:  Mouth Opening: Normal  Tongue: Normal  Neck ROM: Normal ROM    Dental:  Intact    Chest/Lungs:  Clear to auscultation, Normal Respiratory Rate    Heart:  Rate:  Normal  Rhythm: Regular Rhythm  Sounds: Normal        Anesthesia Assessment: Preoperative EQUATION    Planned Procedure: Procedure(s) (LRB):  HYPOPHYSECTOMY, TRANSSPHENOIDAL APPROACH (N/A)  Requested Anesthesia Type:General  Surgeon: Hilario Nevarez MD  Service: Neurosurgery  Known or anticipated Date of Surgery:6/29/2023    Surgeon notes: reviewed    Electronic QUestionnaire Assessment completed via nurse interview with patient.        Triage considerations:       Previous anesthesia records:No problems and Not available    Last PCP note:  NONE LISTED  Subspecialty notes: Cardiology: General, Endocrinology, Neurology, Neurosurgery, Psychiatry, BREAST SURGERY, OB/GYN    Other important co-morbidities: PER Epic: Obesity and PITUITARY ADENOMA       Tests already available:  Available tests,  3-6 months ago , outside [facility] . ( IN CARE EVERYWHERE) : 3/20/2023 MRI PITUITARY W W/O CONTRAST             Instructions given. (See in Nurse's note)    Optimization:  Anesthesia Preop Clinic Assessment  Indicated    Medical Opinion Indicated           Plan:    Testing:  BMP, CBC, PT/INR, PTT, T&C, T&S, and UA   Pre-anesthesia  visit       Visit focus: concerns in complex and/or prolonged anesthesia     Consultation:IM Perioperative Hospitalist OR NP     Patient  has previously scheduled Medical Appointment:NONE    Navigation: Tests Scheduled. TBD             Consults scheduled.TBD             Results will be tracked by Preop Clinic.  Jessica Disla RN BSN          MEDICAL CLEARANCE     Patient is optimized     Patient/ care giver/ Family member was instructed to call and update me about any changes to health,  medication, office visits ,testing out side of the [facility] , hospitalizations between now and surgery       Lynda Kiser MD  Internal Medicine  [facility]   Cell Phone- [phone]

## 2023-06-07 NOTE — PRE-PROCEDURE INSTRUCTIONS
Patient stated has not had any problem with anesthesia in the past. Will need medical optimization by Dr. Kiser and poc appt or NP, labs and ua.  Our  will call to set up these appts.    Preop instructions given. Hold aspirin, aspirin containing products, nsaids(aleve, advil, motrin, ibuprofen, naprosyn, naproxen, voltaren, diclofenac), vitamins ( ergocalciferol) and supplements one week prior to surgery.     May take Tylenol.( Sent to [facility] portal)  Verbalizes understanding.

## 2023-06-14 ENCOUNTER — TELEPHONE (OUTPATIENT)
Dept: NEUROSURGERY | Facility: CLINIC | Age: 28
End: 2023-06-14
Payer: COMMERCIAL

## 2023-06-19 NOTE — DISCHARGE INSTRUCTIONS
Your surgery has been scheduled for:___________6/29/23_______________________________    You should report to:  ____[facility], located on the [place] side of the first floor of the           [facility] ([phone])  __X__[facility], located on the [facility] side of the            Second floor of the [facility] ([phone])  ____3rd Floor [facility] located on the [facility] side of the [facility] [phone]  ____[facility]: located at [address] [facility] [address] [place] [address]. [address]     Please Note   Tell your doctor if you take Aspirin, products containing Aspirin, herbal medications  or blood thinners, such as Coumadin, Ticlid, or Plavix.  (Consult your provider regarding holding or stopping before surgery).  Arrange for someone to drive you home following surgery.  You will not be allowed to leave the surgical facility alone or drive yourself home following sedation and anesthesia.    Before Surgery  Stop taking all herbal medications, vitamins, and supplements 7 days prior to surgery  No Motrin/Advil (Ibuprofen) 7 days before surgery  No Aleve (Naproxen) 7 days before surgery  Stop Taking Asprin, products containing Asprin _____days before surgery  Stop taking blood thinners_______days before surgery  No Goody's/BC  Powder 7 days before surgery  Refrain from drinking alcoholic beverages for 24hours before and after surgery  Stop or limit smoking _________days before surgery  You may take Tylenol for pain    Night before Surgery  Do not eat or drink after midnight  Take a shower or bath (shower is recommended).  Bathe with Hibiclens soap or an antibacterial soap from the neck down.  If not supplied by your surgeon, hibiclens soap will need to be purchased over the counter in pharmacy.  Rinse soap off thoroughly.  Shampoo your hair with your regular shampoo    The Day of  Surgery  Take another bath or shower with hibiclens or any antibacterial soap, to reduce the chance of infection.  Take heart and blood pressure medications with a small sip of water, as advised by the perioperative team.  Do not take fluid pills  You may brush your teeth and rinse your mouth, but do not swall any additional water.   Do not apply perfumes, powder, body lotions or deodorant on the day of surgery.  Nail polish should be removed.  Do not wear makeup or moisturizer  Wear comfortable clothes, such as a button front shirt and loose fitting pants.  Leave all jewelry, including body piercings, and valuables at home.    Bring any devices you will neeed after surgery such as crutches or canes.  If you have sleep apnea, please bring your CPAP machine  In the event that your physical condition changes including the onset of a cold or respiratory illness, or if you have to delay or cancel your surgery, please notify your surgeon.     Anesthesia: General Anesthesia     You are watched continuously during your procedure by your anesthesia provider.     Youre due to have surgery. During surgery, youll be given medicine called anesthesia or anesthetic. This will keep you comfortable and pain-free. Your anesthesia provider will use general anesthesia.  What is general anesthesia?  General anesthesia puts you into a state like deep sleep. It goes into the bloodstream (IV anesthetics), into the lungs (gas anesthetics), or both. You feel nothing during the procedure. You will not remember it. During the procedure, the anesthesia provider monitors you continuously. He or she checks your heart rate and rhythm, blood pressure, breathing, and blood oxygen.  IV anesthetics. IV anesthetics are given through an IV line in your arm. Theyre often given first. This is so you are asleep before a gas anesthetic is started. Some kinds of IV anesthetics relieve pain. Others relax you. Your doctor will decide which kind is best in  your case.  Gas anesthetics. Gas anesthetics are breathed into the lungs. They are often used to keep you asleep. They can be given through a facemask or a tube placed in your larynx or trachea (breathing tube).  If you have a facemask, your anesthesia provider will most likely place it over your nose and mouth while youre still awake. Youll breathe oxygen through the mask as your IV anesthetic is started. Gas anesthetic may be added through the mask.  If you have a tube in the larynx or trachea, it will be inserted into your throat after youre asleep.  Anesthesia tools and medicines  You will likely have:  IV anesthetics. These are put into an IV line into your bloodstream.  Gas anesthetics. You breathe these anesthetics into your lungs, where they pass into your bloodstream.  Pulse oximeter. This is a small clip that is attached to the end of your finger. This measures your blood oxygen level.  Electrocardiography leads (electrodes). These are small sticky pads that are placed on your chest. They record your heart rate and rhythm.  Blood pressure cuff. This reads your blood pressure.  Risks and possible complications  General anesthesia has some risks. These include:  Breathing problems  Nausea and vomiting  Sore throat or hoarseness (usually temporary)  Allergic reaction to the anesthetic  Irregular heartbeat (rare)  Cardiac arrest (rare)   Anesthesia safety  Follow all instructions you are given for how long not to eat or drink before your procedure.  Be sure your doctor knows what medicines and drugs you take. This includes over-the-counter medicines, herbs, supplements, alcohol or other drugs. You will be asked when those were last taken.  Have an adult family member or friend drive you home after the procedure.  For the first 24 hours after your surgery:  Do not drive or use heavy equipment.  Do not make important decisions or sign legal documents. If important decisions or signing legal documents is  necessary during the first 24 hours after surgery, have a trusted family member or spouse act on your behalf.  Avoid alcohol.  Have a responsible adult stay with you. He or she can watch for problems and help keep you safe.  Date Last Reviewed: 12/1/2016 © 2000-2017 [employer]. [address] [place] [address], [place] [address]. All rights reserved. This information is not intended as a substitute for professional medical care. Always follow your healthcare professional's instructions.

## 2023-06-20 ENCOUNTER — HOSPITAL ENCOUNTER (OUTPATIENT)
Dept: PREADMISSION TESTING | Facility: HOSPITAL | Age: 28
Discharge: HOME | End: 2023-06-20
Attending: NEUROLOGICAL SURGERY | Admitting: NEUROLOGICAL SURGERY
Payer: COMMERCIAL

## 2023-06-20 ENCOUNTER — OFFICE VISIT (OUTPATIENT)
Dept: INTERNAL MEDICINE | Facility: CLINIC | Age: 28
End: 2023-06-20
Payer: COMMERCIAL

## 2023-06-20 ENCOUNTER — PATIENT MESSAGE (OUTPATIENT)
Dept: INTERNAL MEDICINE | Facility: CLINIC | Age: 28
End: 2023-06-20

## 2023-06-20 VITALS
HEIGHT: 64 IN | TEMPERATURE: 99 F | SYSTOLIC BLOOD PRESSURE: 108 MMHG | WEIGHT: 203.81 LBS | DIASTOLIC BLOOD PRESSURE: 71 MMHG | BODY MASS INDEX: 34.8 KG/M2 | HEART RATE: 64 BPM | RESPIRATION RATE: 16 BRPM | OXYGEN SATURATION: 99 %

## 2023-06-20 DIAGNOSIS — T78.40XD ALLERGY, SUBSEQUENT ENCOUNTER: ICD-10-CM

## 2023-06-20 DIAGNOSIS — Z86.79 HISTORY OF SINUS TACHYCARDIA: ICD-10-CM

## 2023-06-20 DIAGNOSIS — E55.9 VITAMIN D DEFICIENCY: ICD-10-CM

## 2023-06-20 DIAGNOSIS — K76.0 FATTY LIVER: ICD-10-CM

## 2023-06-20 DIAGNOSIS — Z86.16 HISTORY OF COVID-19: ICD-10-CM

## 2023-06-20 DIAGNOSIS — N20.0 KIDNEY STONES: ICD-10-CM

## 2023-06-20 DIAGNOSIS — D35.2 PITUITARY ADENOMA: ICD-10-CM

## 2023-06-20 DIAGNOSIS — F90.2 ADHD (ATTENTION DEFICIT HYPERACTIVITY DISORDER), COMBINED TYPE: ICD-10-CM

## 2023-06-20 DIAGNOSIS — H18.603 KERATOCONUS OF BOTH EYES: ICD-10-CM

## 2023-06-20 DIAGNOSIS — R82.90 ABNORMAL URINALYSIS: ICD-10-CM

## 2023-06-20 PROBLEM — G47.26 SHIFTING SLEEP-WORK SCHEDULE, AFFECTING SLEEP: Status: RESOLVED | Noted: 2018-06-19 | Resolved: 2023-06-20

## 2023-06-20 PROBLEM — T78.40XA ALLERGIES: Status: ACTIVE | Noted: 2023-06-20

## 2023-06-20 PROCEDURE — 1159F PR MEDICATION LIST DOCUMENTED IN MEDICAL RECORD: ICD-10-PCS | Mod: CPTII,S$GLB,, | Performed by: HOSPITALIST

## 2023-06-20 PROCEDURE — 99999 PR PBB SHADOW E&M-EST. PATIENT-LVL II: ICD-10-PCS | Mod: PBBFAC,,, | Performed by: HOSPITALIST

## 2023-06-20 PROCEDURE — 99215 OFFICE O/P EST HI 40 MIN: CPT | Mod: S$GLB,,, | Performed by: HOSPITALIST

## 2023-06-20 PROCEDURE — 1160F RVW MEDS BY RX/DR IN RCRD: CPT | Mod: CPTII,S$GLB,, | Performed by: HOSPITALIST

## 2023-06-20 PROCEDURE — 1159F MED LIST DOCD IN RCRD: CPT | Mod: CPTII,S$GLB,, | Performed by: HOSPITALIST

## 2023-06-20 PROCEDURE — 1160F PR REVIEW ALL MEDS BY PRESCRIBER/CLIN PHARMACIST DOCUMENTED: ICD-10-PCS | Mod: CPTII,S$GLB,, | Performed by: HOSPITALIST

## 2023-06-20 PROCEDURE — 99215 PR OFFICE/OUTPT VISIT, EST, LEVL V, 40-54 MIN: ICD-10-PCS | Mod: S$GLB,,, | Performed by: HOSPITALIST

## 2023-06-20 PROCEDURE — 99999 PR PBB SHADOW E&M-EST. PATIENT-LVL II: CPT | Mod: PBBFAC,,, | Performed by: HOSPITALIST

## 2023-06-20 RX ORDER — CABERGOLINE 0.5 MG/1
0.25 TABLET ORAL
COMMUNITY
Start: 2022-06-27 | End: 2023-06-27

## 2023-06-20 RX ORDER — DIAZEPAM 5 MG/1
5 TABLET ORAL 2 TIMES DAILY PRN
Status: ON HOLD | COMMUNITY
Start: 2023-03-16 | End: 2023-06-29 | Stop reason: CLARIF

## 2023-06-20 RX ORDER — MULTIVITAMIN
1 TABLET ORAL DAILY
COMMUNITY

## 2023-06-20 RX ORDER — CETIRIZINE HYDROCHLORIDE 5 MG/1
5 TABLET ORAL DAILY PRN
COMMUNITY

## 2023-06-20 NOTE — PROGRESS NOTES
[facility]  Progress Note    Patient Name: Bernarda Cordero  MRN: [id number]  Date of Evaluation- 06/20/2023  PCP- Primary Doctor No    Future cases for Bernarda Cordero [[id number]]       Case ID Status Date Time Colin Procedure Provider Location    [id number] [facility] 6/29/2023  2:00  HYPOPHYSECTOMY, TRANSSPHENOIDAL APPROACH Hilario Nevarez MD [[id number]] [facility] OR 2ND FLR            HPI:  History of present illness- I had the pleasure of meeting this pleasant 28 y.o. lady in the pre op clinic prior to her elective  Neuro  surgery. The patient is new to me .  I have had her mother on the phone during the evaluation   I have obtained the history by speaking to the patient and by reviewing the electronic health records.    Events leading up to surgery / History of presenting illness -    Pituitary adenoma  Delivered 1 st daughter 6/30/2020  Had nursed ( Breast milk )daughter for 1 week - baby could not latch  She had continued milk discharge both breast feeding after 6-8 month despite not breast feeding r   Had abnormal menstrual cycles - no cycles for 1 year , irregular  Had Gynecology evaluation   Elevated Prolactin- had MRI - showed pituitary adenoma   Was treated with Cabergoline that stopped the milk discharge - Cycles became regular-heavier cycles on Cabergoline  Had Neurology follow up   Adenoma size grew over time  Referred to Neurosurgery    Has Keratoconus - apart from, that no visual field problems   Able to drive  No other hormone problems    Hyperprolactinoma     -Breast pain- particularly during mid cycles  -No Nipple discharge   - No Menstrual irregularity     No overt hypo/ hyper thyroid symptoms    No Increased hand/ Foot size   -No  BP, Diabetes problems     No Easy bruising   -No -Acne      No Irregular menses       No - Polyuria--Polydipsia        Has occasional generalized headaches- 2 times a week Moderate - Ibuprofen helps- 1 week hold    Headache increases with  hot weather and  activity and decreases with resting.        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  Mother , father can help   Currently not breast feeding         Subjective/ Objective:     Chief complaint-Preoperative evaluation, Perioperative Medical management, complication reduction plan     Active cardiac conditions- none    Revised cardiac risk index predictors- none    Functional capacity -Examples of physical activity, rides bicycle in the neighborhood, weight  can take 1 flight of stairs and cutting the grass with a mower----- She can undertake all the above activities without  chest pain,chest tightness, Shortness of breath ,dizziness,lightheadedness making her exercise tolerance more,  than 4 Mets.       Review of Systems   Constitutional:  Negative for chills and fever.   HENT:          STOPBANG score  0/ 8       Eyes:         No new visual changes   Respiratory:          No cough , phlegm    No Hemoptysis   Cardiovascular:         As noted   Gastrointestinal:         No overt GI/ blood losses  Bowel movements- Regular   LMP-6/1-6/5/2023  Uses condom  Not pregannt   Endocrine:        Prednisone use > 20 mg daily for 3 weeks-none   Genitourinary:  Negative for dysuria.        No urinary hesitancy    Musculoskeletal:            No unusual, muscle, joint pains   Skin:  Negative for rash.   Neurological:  Negative for syncope.        No unilateral weakness   Hematological:         Current use of Anticoagulants  None   Psychiatric/Behavioral:            No SI/HI   No vascular stenting           No anesthesia, bleeding, cardiac problems , PONV with previous  surgeries/procedures.  Medications and Allergies reviewed in epic.   No family history- Adopted    Physical Exam    Vitals - 1 value per visit 6/20/2023   SYSTOLIC 108   DIASTOLIC 71   Pulse 64   Temp 98.6   Resp 16   SPO2 99   Weight (lb) 203.8   Weight (kg) 92.443   Height 63.5   BMI (Calculated) 35.5     I offered a blanket and the presence of a chaperone during physical examination   She was comfortable to proceed with the exam without the the presence of a chaperone        Physical Exam  Constitutional-   General appearance-Conscious,Coherent  Eyes- No conjunctival icterus,pupils  round  and reactive to light   ENT-Oral cavity- moist    , Hearing grossly normal   Neck- No thyromegaly ,Trachea -central, No jugular venous distension,   No Carotid Bruit   Cardiovascular -Heart Sounds- Normal  and  no murmur   , No gallop rhythm   Respiratory - Normal Respiratory Effort, Normal breath sounds,  no wheeze , and  no forced expiratory wheeze    Peripheral pitting pedal edema-- none , no calf pain   Gastrointestinal -Soft abdomen, No palpable masses, Non Tender,Liver,Spleen not palpable. No-- free fluid and shifting dullness  Musculoskeletal- No finger Clubbing. Strength grossly normal   Lymphatic-No Palpable cervical, axillary,Inguinal lymphadenopathy   Psychiatric - normal effect,Orientation  Rt Dorsalis pedis pulses-palpable    Lt Dorsalis pedis pulses- palpable   Rt Posterior tibial pulses -palpable   Left posterior tibial pulses -palpable   Miscellaneous -  no asterixis,  no dupuytren's contracture, and  no renal bruit     Investigations  Lab and Imaging have been reviewed in epic.      Review of old records- Was done and information gathered regards to events leading to surgery and health conditions of significance in the perioperative period.        Preoperative cardiac risk assessment-  The patient does not have any active cardiac conditions . Revised cardiac risk index predictors- 0---.Functional capacity is  more than 4 Mets. She will be undergoing a Neuro  procedure that carries a Moderate Risk risk     Risk of a major Cardiac event ( Defined as death, myocardial infarction, or cardiac arrest at 30 days after noncardiac surgery), based on RCRI score     -3.9%       No further cardiac work up is indicated prior to proceeding with the surgery     Orders Placed This Encounter    Hepatic Function Panel       American Society of Anesthesiologists Physical status classification ( ASA ) class: 2     Postoperative pulmonary complication risk assessment:         [facility] Respiratory failure index- percentage risk of respiratory failure: 1.8 %    Assessment/Plan:     ADHD (attention deficit hyperactivity disorder), combined type  ADHD, Anxiety   Working full time, part time school   Stays busy   Doing good  Has a supportive family   Coping well  Currently not under mental health care     History of sinus tachycardia  Nov 2022-Jan 2023   Had to go to ER November-Was hospitalized   As per her understanding- work up negative   Was taking ADHD medication at that time     Had Cardiology , EP evaluation    Planned for TILT testing July 2023    Doing good  No heart palpitations, Dizzines,No SOB, chest discomfort    EP eval May 2023    EKG FINDINGS     % of max predicted heart rate. 98%   Time on Treadmill 9:47   METs 12.6   DRAKE +5       No chest pain.   The blood pressure response was normal.   Mildly impaired exercise capacity.   No arrhythmias were present.       EKG INTERPRETATIONS      Resting EKG: Normal sinus rhythm   Less than 1.0mm of further ST depression seen.   Negativestress tolerance test.    Negative for ischemia.    2/6/2023 48 Hr Holter:  THROUGHOUT 48 HOUR MONITORING, PATIENT WAS IN SINUS RHYTHM WITH OCCASIONAL EPISODES OF SINUS ARRHYTHMIA.  HEART RATE RANGING FROM 54 BPM  BPM WITH AN AVERAGE HEART RATE OF 92 BPM.  THERE WERE NO PVC'S NOTED.  THERE WAS 1 ISOLATED PAC.  PATIENT PRESSED THE EVENT BUTTON 81 TIMES  DURING MONITORING, SINUS RHYTHM AND SINUS ARRHYTHMIA NOTED DURING EVENTS.    Impression:  Although medication interaction is possible, symptoms have continued to persist despite changes to medications. Patient with symptoms suggestive of dysautonomia. Thus far, monitoring has not demonstrated overt evidence of SVT or other intrinsic arrhythmia. We have discussed the possibility of this diagnosis and means to evaluate autonomic function.     - Autonomic testing at [facility]  - Re-trial of adderall  - Continue beta blocker  - Return in 3 months     11/16/2022 Echo:  Interpretation Summary  Normal study          Pituitary adenoma  For surgery    Keratoconus of both eyes  Uses  Eye drops to prevent eye allergies   scratching   Under Eye MD care   Uses prescription glasses      I suggest that the perioperative team be aware of this so that appropriate preventive care can be taken     Allergies  Seasonal  Zyrtec as needed helping  Not needed Albuterol bettie  Long time    Kidney stones  Doing good   Stays hydrated    Vitamin D deficiency  On replacement   As per her N    BMI 35.0-35.9,adult  Was able to loose 15 # over a few months ( Feb through now)   With diet     Weight related conditions     Known to have   Fatty liver       Not troubled with / Not known to have     HTN  Type 2 Diabetes   Hyperlipidemia   Sleep apnea   Acid reflux   Osteoarthritis    Encouraged weight loss    Fatty liver  No alcohol excess   Weight loss would have helped    Stages of fatty liver discussed  No hepatitis, cirrhosis      No history  of cirrhosis of liver or suggestions of Liver  decompensation   No Jaundice , dark urine , pale stool   No easy bleeding , bruising    History of COVID-19  Aug 2021  June 2022    Did not require Hospital stay, intubation, Supplemental Oxygen   Recovered  Breathing good     Had 3 doses of COVID vaccine      COVID screening     No fever   No cough   No SOB  No sore throat   No loss of taste or smell   No muscle aches    No nausea, vomiting , diarrhea-           Preventive perioperative care    Thromboembolic prophylaxis:  Her risk factors for thrombosis include surgical procedure.I suggest  thromboembolic prophylaxis ( mechanical/pharmacological, weighing the risk benefits of pharmacological agent use considering susan procedural bleeding )  during the perioperative period.I suggested being active in the post operative period.      Postoperative pulmonary complication prophylaxis-- I suggest incentive spirometry use, early ambulation, and end tidal carbon dioxide monitoring  , oral care , head end of bed elevation      Renal complication prophylaxis- . I suggest keeping her well hydrated  in the perioperative period.      Surgical site Infection Prophylaxis-I  suggest appropriate antibiotic for Prophylaxis against Surgical site infections  No reported Staph infection  Skin antibacterial discussed         This visit was focused on Preoperative evaluation, Perioperative Medical management, complication reduction plans. I suggest that the patient follows up with primary care or relevant sub specialists for ongoing health care.    I appreciate the opportunity to be involved in this patients care. Please feel free to contact me if there were any questions about this consultation.    Patient is optimized    Patient/ care giver/ Family member was instructed to call and update me about any changes to health,  medication, office visits ,testing out side of the [facility] , hospitalizations between now and surgery      Lynda Kiser MD  Internal Medicine  [facility]   Cell Phone- [phone]      I have spent ---105--- minutes of time which includes, time spent to prepare to see the patient , obtaining history ,performing examination, counseling/Educating the patient , Documenting clinical information in the record    --  6/20/2023- 1900    Followed up on the lab results   Acceptable results    Urinalysis is positive for urinary tract infection but squamous epithelial cells are high   clinically she has no suggestion of urinary tract infection  Spoke to her ,    No clinical S/O uti  --  6/21/2023- 1701    She corresponded through the portal wanting to have repeat Urinalysis     Called and spoke to her   Back pain from the way she laid resolved  No myeloma history  No fever , urinary burning , no odor , cloudy urine    -  6/27/2023-1607     Called Cardiologist Dr Hardy   Spoke to nurse- good to proceed  Cardiology F/U July 7 th  Called and spoke to her   Doing good   Called to follow up , spoke to her to address any concerns with the up coming surgery or any questions on Medication instructions -  Doing well ,No changes to Medication, Health -  No dysuria   No fever  --  6/28/2023- 1813  Called to follow up , to address any concerns with the up coming surgery or any questions on Medication instructions   Unable to speak   Left a message to call, if needed , if has any concerns with the up coming surgery or any questions on Medication instructions , if had changes to medication or health , since my evaluation

## 2023-06-20 NOTE — HPI
History of present illness- I had the pleasure of meeting this pleasant 28 y.o. lady in the pre op clinic prior to her elective  Neuro  surgery. The patient is new to me .  I have had her mother on the phone during the evaluation   I have obtained the history by speaking to the patient and by reviewing the electronic health records.    Events leading up to surgery / History of presenting illness -    Pituitary adenoma  Delivered 1 st daughter 6/30/2020  Had nursed ( Breast milk )daughter for 1 week - baby could not latch  She had continued milk discharge both breast feeding after 6-8 month despite not breast feeding r   Had abnormal menstrual cycles - no cycles for 1 year , irregular  Had Gynecology evaluation   Elevated Prolactin- had MRI - showed pituitary adenoma   Was treated with Cabergoline that stopped the milk discharge - Cycles became regular-heavier cycles on Cabergoline  Had Neurology follow up   Adenoma size grew over time  Referred to Neurosurgery    Has Keratoconus - apart from, that no visual field problems   Able to drive  No other hormone problems    Hyperprolactinoma     -Breast pain- particularly during mid cycles  -No Nipple discharge   - No Menstrual irregularity     No overt hypo/ hyper thyroid symptoms    No Increased hand/ Foot size   -No  BP, Diabetes problems     No Easy bruising   -No -Acne      No Irregular menses       No - Polyuria--Polydipsia        Has occasional generalized headaches- 2 times a week Moderate - Ibuprofen helps- 1 week hold    Headache increases with  hot weather and activity and decreases with resting.        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

## 2023-06-20 NOTE — ASSESSMENT & PLAN NOTE
Aug 2021  June 2022    Did not require Hospital stay, intubation, Supplemental Oxygen   Recovered  Breathing good     Had 3 doses of COVID vaccine      COVID screening     No fever   No cough   No SOB  No sore throat   No loss of taste or smell   No muscle aches   No nausea, vomiting , diarrhea-

## 2023-06-20 NOTE — OUTPATIENT SUBJECTIVE & OBJECTIVE
Outpatient Subjective & Objective     Chief complaint-Preoperative evaluation, Perioperative Medical management, complication reduction plan     Active cardiac conditions- none    Revised cardiac risk index predictors- none    Functional capacity -Examples of physical activity, rides bicycle in the neighborhood, weight  can take 1 flight of stairs and cutting the grass with a mower----- She can undertake all the above activities without  chest pain,chest tightness, Shortness of breath ,dizziness,lightheadedness making her exercise tolerance more,  than 4 Mets.       Review of Systems   Constitutional:  Negative for chills and fever.   HENT:          STOPBANG score  0/ 8       Eyes:         No new visual changes   Respiratory:          No cough , phlegm    No Hemoptysis   Cardiovascular:         As noted   Gastrointestinal:         No overt GI/ blood losses  Bowel movements- Regular   LMP-6/1-6/5/2023  Uses condom  Not pregannt   Endocrine:        Prednisone use > 20 mg daily for 3 weeks-none   Genitourinary:  Negative for dysuria.        No urinary hesitancy    Musculoskeletal:            No unusual, muscle, joint pains   Skin:  Negative for rash.   Neurological:  Negative for syncope.        No unilateral weakness   Hematological:         Current use of Anticoagulants  None   Psychiatric/Behavioral:            No SI/HI   No vascular stenting           No anesthesia, bleeding, cardiac problems , PONV with previous surgeries/procedures.  Medications and Allergies reviewed in epic.   No family history- Adopted    Physical Exam    Vitals - 1 value per visit 6/20/2023   SYSTOLIC 108   DIASTOLIC 71   Pulse 64   Temp 98.6   Resp 16   SPO2 99   Weight (lb) 203.8   Weight (kg) 92.443   Height 63.5   BMI (Calculated) 35.5     I offered a blanket and the presence of a chaperone during physical examination   She was comfortable to proceed with the exam without the the presence of a chaperone        Physical  Exam  Constitutional-   General appearance-Conscious,Coherent  Eyes- No conjunctival icterus,pupils  round  and reactive to light   ENT-Oral cavity- moist    , Hearing grossly normal   Neck- No thyromegaly ,Trachea -central, No jugular venous distension,   No Carotid Bruit   Cardiovascular -Heart Sounds- Normal  and  no murmur   , No gallop rhythm   Respiratory - Normal Respiratory Effort, Normal breath sounds,  no wheeze , and  no forced expiratory wheeze    Peripheral pitting pedal edema-- none , no calf pain   Gastrointestinal -Soft abdomen, No palpable masses, Non Tender,Liver,Spleen not palpable. No-- free fluid and shifting dullness  Musculoskeletal- No finger Clubbing. Strength grossly normal   Lymphatic-No Palpable cervical, axillary,Inguinal lymphadenopathy   Psychiatric - normal effect,Orientation  Rt Dorsalis pedis pulses-palpable    Lt Dorsalis pedis pulses- palpable   Rt Posterior tibial pulses -palpable   Left posterior tibial pulses -palpable   Miscellaneous -  no asterixis,  no dupuytren's contracture, and  no renal bruit     Investigations  Lab and Imaging have been reviewed in epic.      Review of old records- Was done and information gathered regards to events leading to surgery and health conditions of significance in the perioperative period.    Outpatient Subjective & Objective

## 2023-06-20 NOTE — ASSESSMENT & PLAN NOTE
Uses  Eye drops to prevent eye allergies   scratching   Under Eye MD care   Uses prescription glasses      I suggest that the perioperative team be aware of this so that appropriate preventive care can be taken

## 2023-06-20 NOTE — ASSESSMENT & PLAN NOTE
Was able to loose 15 # over a few months ( Feb through now)   With diet     Weight related conditions     Known to have   Fatty liver       Not troubled with / Not known to have     HTN  Type 2 Diabetes   Hyperlipidemia   Sleep apnea   Acid reflux   Osteoarthritis    Encouraged weight loss

## 2023-06-20 NOTE — ASSESSMENT & PLAN NOTE
No alcohol excess   Weight loss would have helped    Stages of fatty liver discussed  No hepatitis, cirrhosis      No history  of cirrhosis of liver or suggestions of Liver  decompensation   No Jaundice , dark urine , pale stool   No easy bleeding , bruising

## 2023-06-20 NOTE — ASSESSMENT & PLAN NOTE
ADHD, Anxiety   Working full time, part time school   Stays busy   Doing good  Has a supportive family   Coping well  Currently not under mental health care

## 2023-06-20 NOTE — ASSESSMENT & PLAN NOTE
Nov 2022-Jan 2023   Had to go to ER November-Was hospitalized   As per her understanding- work up negative   Was taking ADHD medication at that time     Had Cardiology , EP evaluation    Planned for TILT testing July 2023    Doing good  No heart palpitations, Dizzines,No SOB, chest discomfort    EP eval May 2023    EKG FINDINGS     % of max predicted heart rate. 98%   Time on Treadmill 9:47   METs 12.6   DRAKE +5       No chest pain.   The blood pressure response was normal.   Mildly impaired exercise capacity.   No arrhythmias were present.       EKG INTERPRETATIONS      Resting EKG: Normal sinus rhythm   Less than 1.0mm of further ST depression seen.   Negativestress tolerance test.    Negative for ischemia.    2/6/2023 48 Hr Holter:  THROUGHOUT 48 HOUR MONITORING, PATIENT WAS IN SINUS RHYTHM WITH OCCASIONAL EPISODES OF SINUS ARRHYTHMIA.  HEART RATE RANGING FROM 54 BPM  BPM WITH AN AVERAGE HEART RATE OF 92 BPM.  THERE WERE NO PVC'S NOTED.  THERE WAS 1 ISOLATED PAC.  PATIENT PRESSED THE EVENT BUTTON 81 TIMES DURING MONITORING, SINUS RHYTHM AND SINUS ARRHYTHMIA NOTED DURING EVENTS.    Impression:  Although medication interaction is possible, symptoms have continued to persist despite changes to medications. Patient with symptoms suggestive of dysautonomia. Thus far, monitoring has not demonstrated overt evidence of SVT or other intrinsic arrhythmia. We have discussed the possibility of this diagnosis and means to evaluate autonomic function.     - Autonomic testing at [facility]  - Re-trial of adderall  - Continue beta blocker  - Return in 3 months     11/16/2022 Echo:  Interpretation Summary  Normal study

## 2023-06-21 PROBLEM — R82.90 ABNORMAL URINALYSIS: Status: ACTIVE | Noted: 2023-06-21

## 2023-06-22 ENCOUNTER — TELEPHONE (OUTPATIENT)
Dept: INTERNAL MEDICINE | Facility: CLINIC | Age: 28
End: 2023-06-22
Payer: COMMERCIAL

## 2023-06-22 ENCOUNTER — LAB VISIT (OUTPATIENT)
Dept: LAB | Facility: HOSPITAL | Age: 28
End: 2023-06-22
Attending: HOSPITALIST
Payer: COMMERCIAL

## 2023-06-22 DIAGNOSIS — N39.0 URINARY TRACT INFECTION WITHOUT HEMATURIA, SITE UNSPECIFIED: Primary | ICD-10-CM

## 2023-06-22 DIAGNOSIS — N39.0 URINARY TRACT INFECTION WITHOUT HEMATURIA, SITE UNSPECIFIED: ICD-10-CM

## 2023-06-22 LAB
BILIRUB UR QL STRIP: NEGATIVE
CLARITY UR REFRACT.AUTO: CLEAR
COLOR UR AUTO: COLORLESS
GLUCOSE UR QL STRIP: NEGATIVE
HGB UR QL STRIP: NEGATIVE
KETONES UR QL STRIP: NEGATIVE
LEUKOCYTE ESTERASE UR QL STRIP: NEGATIVE
NITRITE UR QL STRIP: NEGATIVE
PH UR STRIP: 6 [PH] (ref 5–8)
PROT UR QL STRIP: NEGATIVE
SP GR UR STRIP: 1 (ref 1–1.03)
URN SPEC COLLECT METH UR: ABNORMAL

## 2023-06-22 PROCEDURE — 81003 URINALYSIS AUTO W/O SCOPE: CPT | Performed by: HOSPITALIST

## 2023-06-28 ENCOUNTER — TELEPHONE (OUTPATIENT)
Dept: NEUROSURGERY | Facility: CLINIC | Age: 28
End: 2023-06-28
Payer: COMMERCIAL

## 2023-06-28 ENCOUNTER — HOSPITAL ENCOUNTER (OUTPATIENT)
Dept: RADIOLOGY | Facility: HOSPITAL | Age: 28
Discharge: HOME | End: 2023-06-28
Attending: PHYSICIAN ASSISTANT
Payer: COMMERCIAL

## 2023-06-28 ENCOUNTER — ANESTHESIA EVENT (OUTPATIENT)
Dept: SURGERY | Facility: HOSPITAL | Age: 28
DRG: 615 | End: 2023-06-28
Payer: COMMERCIAL

## 2023-06-28 ENCOUNTER — PATIENT MESSAGE (OUTPATIENT)
Dept: INTERNAL MEDICINE | Facility: CLINIC | Age: 28
End: 2023-06-28
Payer: COMMERCIAL

## 2023-06-28 ENCOUNTER — PATIENT MESSAGE (OUTPATIENT)
Dept: NEUROSURGERY | Facility: CLINIC | Age: 28
End: 2023-06-28
Payer: COMMERCIAL

## 2023-06-28 DIAGNOSIS — D35.2 PITUITARY ADENOMA: ICD-10-CM

## 2023-06-28 DIAGNOSIS — D35.2 PITUITARY ADENOMA: Primary | ICD-10-CM

## 2023-06-28 PROCEDURE — 70450 CT HEAD/BRAIN W/O DYE: CPT | Mod: 26,,, | Performed by: RADIOLOGY

## 2023-06-28 PROCEDURE — 70450 CT HEAD STEALTH W/O CONTRAST: ICD-10-PCS | Mod: 26,,, | Performed by: RADIOLOGY

## 2023-06-28 PROCEDURE — 70450 CT HEAD/BRAIN W/O DYE: CPT | Mod: TC

## 2023-06-28 NOTE — TELEPHONE ENCOUNTER
S/W pt. Informed pt we would need the CT stealth on imaging disc for surgery tomorrow and if she does not have it, surgery will have to be pushed back. Pt said she would go to [facility] today and get the imaging on a disc.

## 2023-06-28 NOTE — ANESTHESIA PREPROCEDURE EVALUATION
[facility]  Anesthesia Pre-Operative Evaluation   06/28/2023        Bernarda Cordero, 1995  [id number]  Procedure(s) (LRB):  HYPOPHYSECTOMY, TRANSSPHENOIDAL APPROACH (N/A)    Subjective    Bernarda Cordero is a 28 y.o. female w/ a significant PMHx of ADHD, obesity BMI 35, sinus tachycardia on metoprolol (with negative workup), and 1.3 cm pituitary/suprasellar mass associated with hyperprolactinemia presenting after childbirth.     Patient now presents for above procedure(s).     Prev Airway: None documented.      Patient Active Problem List   Diagnosis    ADHD (attention deficit hyperactivity disorder), combined type    Anxiety disorder    Pituitary adenoma    History of sinus tachycardia    Keratoconus of both eyes    Allergies    Vitamin D deficiency    Kidney stones    BMI 35.0-35.9,adult    Fatty liver    History of COVID-19    Abnormal urinalysis       Review of patient's allergies indicates:   Allergen Reactions    Sulfa (sulfonamide antibiotics) Hives       Current Inpatient Medications:       No current facility-administered medications on file prior to encounter.     Current Outpatient Medications on File Prior to Encounter   Medication Sig Dispense Refill    albuterol (PROVENTIL/VENTOLIN HFA) 90 mcg/actuation inhaler as needed.      bepotastine besilate (BEPREVE) 1.5 % Drop Place 1 drop into both eyes once daily. 10 mL 11    BEPREVE 1.5 % Drop Place 1 drop into both eyes 2 (two) times daily. 5 mL 6    cabergoline (DOSTINEX) 0.5 mg tablet Take 0.25 mg by mouth twice a week. MONDAYS AND THURSDAYS      dextroamphetamine-amphetamine (ADDERALL XR) 30 MG 24 hr capsule Take 1 capsule (30 mg total) by mouth every morning. 45 capsule 0    ergocalciferol (ERGOCALCIFEROL) 50,000 unit Cap Take 50,000 Units by mouth.      hypochlorous acid-sodium chlor (AVENOVA) 0.01 % Spry Avenova 0.01 % topical spray      ibuprofen (ADVIL,MOTRIN) 100 mg/5 mL suspension ibuprofen 800 mg  tablet      lifitegrast (XIIDRA) 5 % Dpet Apply to eye 2 (two) times daily.      metoprolol succinate (TOPROL-XL) 25 MG 24 hr tablet Take 25 mg by mouth every evening.      RESTASIS 0.05 % ophthalmic emulsion Place 1 drop into both eyes 2 (two) times daily.         Past Surgical History:   Procedure Laterality Date    APPENDECTOMY      2012    CYST REMOVAL      left ring finger- [employer] school       Social History:  Tobacco Use: Low Risk     Smoking Tobacco Use: Never    Smokeless Tobacco Use: Never    Passive Exposure: Not on file       Alcohol Use: Not on file       Objective    Vital Signs Range:  BMI Readings from Last 1 Encounters:   06/20/23 35.54 kg/m²               Significant Labs:        Component Value Date/Time    WBC 7.84 06/20/2023 1048    HGB 14.8 06/20/2023 1048    HCT 43.7 06/20/2023 1048     06/20/2023 1048     06/20/2023 1048    K 4.2 06/20/2023 1048     06/20/2023 1048    CO2 25 06/20/2023 1048    GLU 82 06/20/2023 1048    BUN 12 06/20/2023 1048    CREATININE 0.8 06/20/2023 1048    CALCIUM 9.2 06/20/2023 1048    ALBUMIN 4.1 06/20/2023 1048    PROT 7.4 06/20/2023 1048    ALKPHOS 56 06/20/2023 1048    BILITOT 0.4 06/20/2023 1048    AST 22 06/20/2023 1048    ALT 16 06/20/2023 1048    INR 1.0 06/20/2023 1048    HGBA1C 5.3 06/12/2023 1302        Please see Results Review for additional labs.     Diagnostic Studies: All relevant studies, reviewed.      EKG:   Results for orders placed or performed during the hospital encounter of 02/01/17   SCHEDULED EKG 12-LEAD (to Muse)    Collection Time: 02/01/17  4:12 PM    Narrative    Test Reason : F90.2    Vent. Rate : 077 BPM     Atrial Rate : 077 BPM     P-R Int : 148 ms          QRS Dur : 084 ms      QT Int : 350 ms       P-R-T Axes : 025 064 037 degrees     QTc Int : 396 ms    Normal sinus rhythm with sinus arrhythmia  Normal ECG  No previous ECGs available  Confirmed by NAVID OLIVER MD ([id number]) on 2/2/2017 9:43:43 AM    Referred By: CLIFFORD  SERAFIN           Confirmed By:NAVID OLIVER MD       ECHO:  No results found for this or any previous visit.          Pre-op Assessment    I have reviewed the Patient Summary Reports.     I have reviewed the Nursing Notes. I have reviewed the NPO Status.   I have reviewed the Medications.     Review of Systems  Anesthesia Hx:  No problems with previous Anesthesia  Denies Family Hx of Anesthesia complications.   Denies Personal Hx of Anesthesia complications.   Social:  Non-Smoker, No Alcohol Use    Hematology/Oncology:  Hematology Normal   Oncology Normal     EENT/Dental:   chronic allergic rhinitis Eyes: Crosslinking    Cardiovascular:   Exercise tolerance: good Denies Pacemaker.  Denies Hypertension.  Angina, at rest denies PVD no hyperlipidemia  Denies MADDOX. Sinus tachycardia---Metropolol Functional Capacity 4 METS    Pulmonary:   Asthma asymptomatic Denies Shortness of breath.  Denies Recent URI.  Denies Sleep Apnea. 8/2021 COVID+---RESOLVED   Renal/:   renal calculi    Hepatic/GI:   2012 APPENDECTOMY   Musculoskeletal:  Musculoskeletal General/Symptoms: HEALED LEFT WRIST FRACTURE   Neurological:   Headaches Denies Seizures.    Endocrine:   Denies Diabetes. Denies Hypothyroidism. Denies Hyperthyroidism.  Pituitary Disease, Pituitary Tumor Pituitary adenoma Denies Obesity / BMI > 30  Psych:   Psychiatric History anxiety ADHD (attention deficit hyperactivity disorder), combined type--DC MEDS since 11/2022 r/t tachycardia         Physical Exam  General: Well nourished, Cooperative, Alert, Oriented and Anxious    Airway:  Mallampati: II   Mouth Opening: Normal  TM Distance: Normal  Tongue: Normal  Neck ROM: Normal ROM    Dental:  Intact        Anesthesia Plan  Type of Anesthesia, risks & benefits discussed:    Anesthesia Type: Gen ETT  Intra-op Monitoring Plan: Standard ASA Monitors  Post Op Pain Control Plan: multimodal analgesia and IV/PO Opioids PRN  Induction:  IV  Airway Plan: Direct, Post-Induction  Informed  Consent: Informed consent signed with the Patient and all parties understand the risks and agree with anesthesia plan.  All questions answered.   ASA Score: 2  Day of Surgery Review of History & Physical: H&P Update referred to the surgeon/provider.    Ready For Surgery From Anesthesia Perspective.     .

## 2023-06-28 NOTE — TELEPHONE ENCOUNTER
Patient contacted. Advised to arrive for surgery at 0500, DOSC, NPO after MN, shower x 2 with Hibiclens or Dial antibacterial soap. Understanding verbalized by patient.

## 2023-06-29 ENCOUNTER — HOSPITAL ENCOUNTER (INPATIENT)
Facility: HOSPITAL | Age: 28
LOS: 2 days | Discharge: HOME | DRG: 615 | End: 2023-07-01
Attending: NEUROLOGICAL SURGERY | Admitting: NEUROLOGICAL SURGERY
Payer: COMMERCIAL

## 2023-06-29 ENCOUNTER — ANESTHESIA (OUTPATIENT)
Dept: SURGERY | Facility: HOSPITAL | Age: 28
DRG: 615 | End: 2023-06-29
Payer: COMMERCIAL

## 2023-06-29 DIAGNOSIS — E23.7 PITUITARY ABNORMALITY: ICD-10-CM

## 2023-06-29 DIAGNOSIS — Z01.818 PREOPERATIVE TESTING: ICD-10-CM

## 2023-06-29 DIAGNOSIS — D35.2 PITUITARY ADENOMA: ICD-10-CM

## 2023-06-29 LAB
ABO + RH BLD: NORMAL
B-HCG UR QL: NEGATIVE
BILIRUB UR QL STRIP: NEGATIVE
BILIRUB UR QL STRIP: NEGATIVE
BLD GP AB SCN CELLS X3 SERPL QL: NORMAL
CLARITY UR REFRACT.AUTO: ABNORMAL
CLARITY UR REFRACT.AUTO: CLEAR
COLOR UR AUTO: YELLOW
COLOR UR AUTO: YELLOW
CREAT SERPL-MCNC: 0.7 MG/DL (ref 0.5–1.4)
CTP QC/QA: YES
EST. GFR  (NO RACE VARIABLE): >60 ML/MIN/1.73 M^2
GLUCOSE UR QL STRIP: NEGATIVE
GLUCOSE UR QL STRIP: NEGATIVE
HGB UR QL STRIP: NEGATIVE
HGB UR QL STRIP: NEGATIVE
KETONES UR QL STRIP: ABNORMAL
KETONES UR QL STRIP: ABNORMAL
LEUKOCYTE ESTERASE UR QL STRIP: NEGATIVE
LEUKOCYTE ESTERASE UR QL STRIP: NEGATIVE
NITRITE UR QL STRIP: NEGATIVE
NITRITE UR QL STRIP: NEGATIVE
PH UR STRIP: 7 [PH] (ref 5–8)
PH UR STRIP: 7 [PH] (ref 5–8)
PROT UR QL STRIP: ABNORMAL
PROT UR QL STRIP: NEGATIVE
SODIUM SERPL-SCNC: 136 MMOL/L (ref 136–145)
SODIUM SERPL-SCNC: 138 MMOL/L (ref 136–145)
SODIUM SERPL-SCNC: 139 MMOL/L (ref 136–145)
SODIUM SERPL-SCNC: 139 MMOL/L (ref 136–145)
SP GR UR STRIP: 1.02 (ref 1–1.03)
SP GR UR STRIP: 1.03 (ref 1–1.03)
SPECIMEN OUTDATE: NORMAL
URN SPEC COLLECT METH UR: ABNORMAL
URN SPEC COLLECT METH UR: ABNORMAL

## 2023-06-29 PROCEDURE — 88341 IMHCHEM/IMCYTCHM EA ADD ANTB: CPT | Mod: 26,,, | Performed by: STUDENT IN AN ORGANIZED HEALTH CARE EDUCATION/TRAINING PROGRAM

## 2023-06-29 PROCEDURE — 25000003 PHARM REV CODE 250: Performed by: STUDENT IN AN ORGANIZED HEALTH CARE EDUCATION/TRAINING PROGRAM

## 2023-06-29 PROCEDURE — 63600175 PHARM REV CODE 636 W HCPCS: Performed by: NEUROLOGICAL SURGERY

## 2023-06-29 PROCEDURE — 63600175 PHARM REV CODE 636 W HCPCS: Performed by: NURSE PRACTITIONER

## 2023-06-29 PROCEDURE — D9220A PRA ANESTHESIA: Mod: ,,, | Performed by: ANESTHESIOLOGY

## 2023-06-29 PROCEDURE — 25000003 PHARM REV CODE 250: Performed by: NEUROLOGICAL SURGERY

## 2023-06-29 PROCEDURE — D9220A PRA ANESTHESIA: ICD-10-PCS | Mod: ,,, | Performed by: ANESTHESIOLOGY

## 2023-06-29 PROCEDURE — 99223 1ST HOSP IP/OBS HIGH 75: CPT | Mod: ,,, | Performed by: PSYCHIATRY & NEUROLOGY

## 2023-06-29 PROCEDURE — 25000003 PHARM REV CODE 250

## 2023-06-29 PROCEDURE — 88307 TISSUE EXAM BY PATHOLOGIST: CPT | Performed by: STUDENT IN AN ORGANIZED HEALTH CARE EDUCATION/TRAINING PROGRAM

## 2023-06-29 PROCEDURE — 88342 IMHCHEM/IMCYTCHM 1ST ANTB: CPT | Performed by: STUDENT IN AN ORGANIZED HEALTH CARE EDUCATION/TRAINING PROGRAM

## 2023-06-29 PROCEDURE — 84295 ASSAY OF SERUM SODIUM: CPT | Mod: 91 | Performed by: STUDENT IN AN ORGANIZED HEALTH CARE EDUCATION/TRAINING PROGRAM

## 2023-06-29 PROCEDURE — 27201423 OPTIME MED/SURG SUP & DEVICES STERILE SUPPLY: Performed by: NEUROLOGICAL SURGERY

## 2023-06-29 PROCEDURE — C1713 ANCHOR/SCREW BN/BN,TIS/BN: HCPCS | Performed by: NEUROLOGICAL SURGERY

## 2023-06-29 PROCEDURE — 51701 INSERT BLADDER CATHETER: CPT

## 2023-06-29 PROCEDURE — 99232 PR SUBSEQUENT HOSPITAL CARE,LEVL II: ICD-10-PCS | Mod: ,,, | Performed by: INTERNAL MEDICINE

## 2023-06-29 PROCEDURE — 81003 URINALYSIS AUTO W/O SCOPE: CPT | Performed by: STUDENT IN AN ORGANIZED HEALTH CARE EDUCATION/TRAINING PROGRAM

## 2023-06-29 PROCEDURE — 88341 PR IHC OR ICC EACH ADD'L SINGLE ANTIBODY  STAINPR: ICD-10-PCS | Mod: 26,,, | Performed by: STUDENT IN AN ORGANIZED HEALTH CARE EDUCATION/TRAINING PROGRAM

## 2023-06-29 PROCEDURE — 61781 SCAN PROC CRANIAL INTRA: CPT | Mod: ,,, | Performed by: NEUROLOGICAL SURGERY

## 2023-06-29 PROCEDURE — 37000008 HC ANESTHESIA 1ST 15 MINUTES: Performed by: NEUROLOGICAL SURGERY

## 2023-06-29 PROCEDURE — 36000713 HC OR TIME LEV V EA ADD 15 MIN: Performed by: NEUROLOGICAL SURGERY

## 2023-06-29 PROCEDURE — 99223 PR INITIAL HOSPITAL CARE,LEVL III: ICD-10-PCS | Mod: ,,, | Performed by: PSYCHIATRY & NEUROLOGY

## 2023-06-29 PROCEDURE — 86900 BLOOD TYPING SEROLOGIC ABO: CPT | Performed by: STUDENT IN AN ORGANIZED HEALTH CARE EDUCATION/TRAINING PROGRAM

## 2023-06-29 PROCEDURE — 93005 ELECTROCARDIOGRAM TRACING: CPT

## 2023-06-29 PROCEDURE — 27201037 HC PRESSURE MONITORING SET UP

## 2023-06-29 PROCEDURE — 88313 PR  SPECIAL STAINS,GROUP II: ICD-10-PCS | Mod: 26,,, | Performed by: STUDENT IN AN ORGANIZED HEALTH CARE EDUCATION/TRAINING PROGRAM

## 2023-06-29 PROCEDURE — 61548 REMOVAL OF PITUITARY GLAND: CPT | Mod: ,,, | Performed by: NEUROLOGICAL SURGERY

## 2023-06-29 PROCEDURE — 88342 IMHCHEM/IMCYTCHM 1ST ANTB: CPT | Mod: 26,,, | Performed by: STUDENT IN AN ORGANIZED HEALTH CARE EDUCATION/TRAINING PROGRAM

## 2023-06-29 PROCEDURE — 88341 IMHCHEM/IMCYTCHM EA ADD ANTB: CPT | Performed by: STUDENT IN AN ORGANIZED HEALTH CARE EDUCATION/TRAINING PROGRAM

## 2023-06-29 PROCEDURE — 25000003 PHARM REV CODE 250: Performed by: PHYSICIAN ASSISTANT

## 2023-06-29 PROCEDURE — 61781 PR STEREOTACTIC COMP ASSIST PROC,CRANIAL,INTRADURAL: ICD-10-PCS | Mod: ,,, | Performed by: NEUROLOGICAL SURGERY

## 2023-06-29 PROCEDURE — 99232 SBSQ HOSP IP/OBS MODERATE 35: CPT | Mod: ,,, | Performed by: INTERNAL MEDICINE

## 2023-06-29 PROCEDURE — 63600175 PHARM REV CODE 636 W HCPCS

## 2023-06-29 PROCEDURE — 25000003 PHARM REV CODE 250: Performed by: PSYCHIATRY & NEUROLOGY

## 2023-06-29 PROCEDURE — 93010 EKG 12-LEAD: ICD-10-PCS | Mod: ,,, | Performed by: INTERNAL MEDICINE

## 2023-06-29 PROCEDURE — 86920 COMPATIBILITY TEST SPIN: CPT | Performed by: NEUROLOGICAL SURGERY

## 2023-06-29 PROCEDURE — 88305 TISSUE EXAM BY PATHOLOGIST: ICD-10-PCS | Mod: 26,,, | Performed by: STUDENT IN AN ORGANIZED HEALTH CARE EDUCATION/TRAINING PROGRAM

## 2023-06-29 PROCEDURE — 20000000 HC ICU ROOM

## 2023-06-29 PROCEDURE — 88305 TISSUE EXAM BY PATHOLOGIST: CPT | Mod: 26,,, | Performed by: STUDENT IN AN ORGANIZED HEALTH CARE EDUCATION/TRAINING PROGRAM

## 2023-06-29 PROCEDURE — 93010 ELECTROCARDIOGRAM REPORT: CPT | Mod: ,,, | Performed by: INTERNAL MEDICINE

## 2023-06-29 PROCEDURE — 81025 URINE PREGNANCY TEST: CPT | Performed by: NEUROLOGICAL SURGERY

## 2023-06-29 PROCEDURE — 88313 SPECIAL STAINS GROUP 2: CPT | Performed by: STUDENT IN AN ORGANIZED HEALTH CARE EDUCATION/TRAINING PROGRAM

## 2023-06-29 PROCEDURE — C9399 UNCLASSIFIED DRUGS OR BIOLOG: HCPCS | Performed by: NEUROLOGICAL SURGERY

## 2023-06-29 PROCEDURE — 88313 SPECIAL STAINS GROUP 2: CPT | Mod: 26,,, | Performed by: STUDENT IN AN ORGANIZED HEALTH CARE EDUCATION/TRAINING PROGRAM

## 2023-06-29 PROCEDURE — 61548 PR EXCIS PITUITARY,TRANSNASAL/SEPTAL: ICD-10-PCS | Mod: ,,, | Performed by: NEUROLOGICAL SURGERY

## 2023-06-29 PROCEDURE — 88305 TISSUE EXAM BY PATHOLOGIST: CPT | Performed by: STUDENT IN AN ORGANIZED HEALTH CARE EDUCATION/TRAINING PROGRAM

## 2023-06-29 PROCEDURE — 36000712 HC OR TIME LEV V 1ST 15 MIN: Performed by: NEUROLOGICAL SURGERY

## 2023-06-29 PROCEDURE — 37000009 HC ANESTHESIA EA ADD 15 MINS: Performed by: NEUROLOGICAL SURGERY

## 2023-06-29 PROCEDURE — 51798 US URINE CAPACITY MEASURE: CPT

## 2023-06-29 PROCEDURE — 82565 ASSAY OF CREATININE: CPT | Performed by: PSYCHIATRY & NEUROLOGY

## 2023-06-29 PROCEDURE — 63600175 PHARM REV CODE 636 W HCPCS: Performed by: STUDENT IN AN ORGANIZED HEALTH CARE EDUCATION/TRAINING PROGRAM

## 2023-06-29 PROCEDURE — 88342 CHG IMMUNOCYTOCHEMISTRY: ICD-10-PCS | Mod: 26,,, | Performed by: STUDENT IN AN ORGANIZED HEALTH CARE EDUCATION/TRAINING PROGRAM

## 2023-06-29 DEVICE — PLATE DELTA STR 8H 2.2X1.4MM: Type: IMPLANTABLE DEVICE | Site: CRANIAL | Status: FUNCTIONAL

## 2023-06-29 RX ORDER — PROPOFOL 10 MG/ML
VIAL (ML) INTRAVENOUS CONTINUOUS PRN
Status: DISCONTINUED | OUTPATIENT
Start: 2023-06-29 | End: 2023-06-29

## 2023-06-29 RX ORDER — BACITRACIN ZINC 500 UNIT/G
OINTMENT (GRAM) TOPICAL
Status: DISCONTINUED | OUTPATIENT
Start: 2023-06-29 | End: 2023-06-29 | Stop reason: HOSPADM

## 2023-06-29 RX ORDER — OXYCODONE HYDROCHLORIDE 5 MG/1
5 TABLET ORAL EVERY 4 HOURS PRN
Status: DISCONTINUED | OUTPATIENT
Start: 2023-06-29 | End: 2023-07-01 | Stop reason: HOSPADM

## 2023-06-29 RX ORDER — LIDOCAINE HYDROCHLORIDE 20 MG/ML
INJECTION, SOLUTION EPIDURAL; INFILTRATION; INTRACAUDAL; PERINEURAL
Status: DISCONTINUED | OUTPATIENT
Start: 2023-06-29 | End: 2023-06-29

## 2023-06-29 RX ORDER — ROCURONIUM BROMIDE 10 MG/ML
INJECTION, SOLUTION INTRAVENOUS
Status: DISCONTINUED | OUTPATIENT
Start: 2023-06-29 | End: 2023-06-29

## 2023-06-29 RX ORDER — METOPROLOL TARTRATE 25 MG/1
12.5 TABLET ORAL EVERY 12 HOURS
Status: DISCONTINUED | OUTPATIENT
Start: 2023-06-29 | End: 2023-07-01 | Stop reason: HOSPADM

## 2023-06-29 RX ORDER — ACETAMINOPHEN 325 MG/1
650 TABLET ORAL EVERY 6 HOURS PRN
Status: DISCONTINUED | OUTPATIENT
Start: 2023-06-29 | End: 2023-06-29

## 2023-06-29 RX ORDER — BUPIVACAINE HYDROCHLORIDE AND EPINEPHRINE 5; 5 MG/ML; UG/ML
INJECTION, SOLUTION EPIDURAL; INTRACAUDAL; PERINEURAL
Status: DISCONTINUED | OUTPATIENT
Start: 2023-06-29 | End: 2023-06-29 | Stop reason: HOSPADM

## 2023-06-29 RX ORDER — DIAZEPAM 5 MG/1
5 TABLET ORAL EVERY 12 HOURS PRN
Status: DISCONTINUED | OUTPATIENT
Start: 2023-06-29 | End: 2023-07-01 | Stop reason: HOSPADM

## 2023-06-29 RX ORDER — ACETAMINOPHEN 10 MG/ML
INJECTION, SOLUTION INTRAVENOUS
Status: DISCONTINUED | OUTPATIENT
Start: 2023-06-29 | End: 2023-06-29

## 2023-06-29 RX ORDER — HYDRALAZINE HYDROCHLORIDE 20 MG/ML
10 INJECTION INTRAMUSCULAR; INTRAVENOUS EVERY 6 HOURS PRN
Status: DISCONTINUED | OUTPATIENT
Start: 2023-06-29 | End: 2023-07-01 | Stop reason: HOSPADM

## 2023-06-29 RX ORDER — FENTANYL CITRATE 50 UG/ML
INJECTION, SOLUTION INTRAMUSCULAR; INTRAVENOUS
Status: DISCONTINUED | OUTPATIENT
Start: 2023-06-29 | End: 2023-06-29

## 2023-06-29 RX ORDER — OXYMETAZOLINE HCL 0.05 %
SPRAY, NON-AEROSOL (ML) NASAL
Status: DISCONTINUED | OUTPATIENT
Start: 2023-06-29 | End: 2023-06-29 | Stop reason: HOSPADM

## 2023-06-29 RX ORDER — DEXMEDETOMIDINE HYDROCHLORIDE 100 UG/ML
INJECTION, SOLUTION INTRAVENOUS
Status: DISCONTINUED | OUTPATIENT
Start: 2023-06-29 | End: 2023-06-29

## 2023-06-29 RX ORDER — OXYCODONE HYDROCHLORIDE 5 MG/1
5 TABLET ORAL ONCE
Status: COMPLETED | OUTPATIENT
Start: 2023-06-29 | End: 2023-06-29

## 2023-06-29 RX ORDER — POLYETHYLENE GLYCOL 3350 17 G/17G
17 POWDER, FOR SOLUTION ORAL DAILY
Status: DISCONTINUED | OUTPATIENT
Start: 2023-06-29 | End: 2023-07-01 | Stop reason: HOSPADM

## 2023-06-29 RX ORDER — AMOXICILLIN 250 MG
1 CAPSULE ORAL DAILY
Status: DISCONTINUED | OUTPATIENT
Start: 2023-06-29 | End: 2023-07-01 | Stop reason: HOSPADM

## 2023-06-29 RX ORDER — OXYMETAZOLINE HCL 0.05 %
2 SPRAY, NON-AEROSOL (ML) NASAL 2 TIMES DAILY PRN
Status: DISCONTINUED | OUTPATIENT
Start: 2023-06-29 | End: 2023-07-01 | Stop reason: HOSPADM

## 2023-06-29 RX ORDER — MORPHINE SULFATE 2 MG/ML
1 INJECTION, SOLUTION INTRAMUSCULAR; INTRAVENOUS ONCE
Status: COMPLETED | OUTPATIENT
Start: 2023-06-29 | End: 2023-06-29

## 2023-06-29 RX ORDER — MIDAZOLAM HYDROCHLORIDE 1 MG/ML
INJECTION, SOLUTION INTRAMUSCULAR; INTRAVENOUS
Status: DISCONTINUED | OUTPATIENT
Start: 2023-06-29 | End: 2023-06-29

## 2023-06-29 RX ORDER — ACETAMINOPHEN 500 MG
1000 TABLET ORAL EVERY 8 HOURS
Status: DISCONTINUED | OUTPATIENT
Start: 2023-06-29 | End: 2023-06-29

## 2023-06-29 RX ORDER — CETIRIZINE HYDROCHLORIDE 5 MG/1
5 TABLET ORAL NIGHTLY
Status: DISCONTINUED | OUTPATIENT
Start: 2023-06-29 | End: 2023-07-01 | Stop reason: HOSPADM

## 2023-06-29 RX ORDER — ONDANSETRON 2 MG/ML
INJECTION INTRAMUSCULAR; INTRAVENOUS
Status: DISCONTINUED | OUTPATIENT
Start: 2023-06-29 | End: 2023-06-29

## 2023-06-29 RX ORDER — OXYCODONE HYDROCHLORIDE 5 MG/1
5 TABLET ORAL EVERY 6 HOURS PRN
Status: DISCONTINUED | OUTPATIENT
Start: 2023-06-29 | End: 2023-06-29

## 2023-06-29 RX ORDER — ONDANSETRON 2 MG/ML
4 INJECTION INTRAMUSCULAR; INTRAVENOUS EVERY 6 HOURS PRN
Status: DISCONTINUED | OUTPATIENT
Start: 2023-06-29 | End: 2023-07-01 | Stop reason: HOSPADM

## 2023-06-29 RX ORDER — LABETALOL HCL 20 MG/4 ML
10 SYRINGE (ML) INTRAVENOUS EVERY 6 HOURS PRN
Status: DISCONTINUED | OUTPATIENT
Start: 2023-06-29 | End: 2023-07-01 | Stop reason: HOSPADM

## 2023-06-29 RX ORDER — PSEUDOEPHEDRINE HCL 30 MG
60 TABLET ORAL EVERY 6 HOURS PRN
Status: DISCONTINUED | OUTPATIENT
Start: 2023-06-29 | End: 2023-07-01 | Stop reason: HOSPADM

## 2023-06-29 RX ORDER — ACETAMINOPHEN 500 MG
1000 TABLET ORAL EVERY 8 HOURS
Status: DISCONTINUED | OUTPATIENT
Start: 2023-06-29 | End: 2023-07-01 | Stop reason: HOSPADM

## 2023-06-29 RX ORDER — MUPIROCIN 20 MG/G
1 OINTMENT TOPICAL 2 TIMES DAILY
Status: DISCONTINUED | OUTPATIENT
Start: 2023-06-29 | End: 2023-06-29

## 2023-06-29 RX ORDER — MUPIROCIN 20 MG/G
OINTMENT TOPICAL
Status: DISPENSED | OUTPATIENT
Start: 2023-06-29

## 2023-06-29 RX ORDER — LIDOCAINE HYDROCHLORIDE AND EPINEPHRINE 10; 10 MG/ML; UG/ML
INJECTION, SOLUTION INFILTRATION; PERINEURAL
Status: DISCONTINUED | OUTPATIENT
Start: 2023-06-29 | End: 2023-06-29 | Stop reason: HOSPADM

## 2023-06-29 RX ORDER — ENOXAPARIN SODIUM 100 MG/ML
40 INJECTION SUBCUTANEOUS EVERY 24 HOURS
Status: DISCONTINUED | OUTPATIENT
Start: 2023-06-30 | End: 2023-07-01 | Stop reason: HOSPADM

## 2023-06-29 RX ORDER — OXYCODONE HYDROCHLORIDE 10 MG/1
10 TABLET ORAL EVERY 4 HOURS PRN
Status: DISCONTINUED | OUTPATIENT
Start: 2023-06-29 | End: 2023-07-01 | Stop reason: HOSPADM

## 2023-06-29 RX ORDER — PROPOFOL 10 MG/ML
VIAL (ML) INTRAVENOUS
Status: DISCONTINUED | OUTPATIENT
Start: 2023-06-29 | End: 2023-06-29

## 2023-06-29 RX ADMIN — ROCURONIUM BROMIDE 20 MG: 10 INJECTION, SOLUTION INTRAVENOUS at 08:06

## 2023-06-29 RX ADMIN — LIDOCAINE HYDROCHLORIDE 100 MG: 20 INJECTION, SOLUTION EPIDURAL; INFILTRATION; INTRACAUDAL; PERINEURAL at 07:06

## 2023-06-29 RX ADMIN — Medication 150 MCG/KG/MIN: at 08:06

## 2023-06-29 RX ADMIN — MUPIROCIN: 20 OINTMENT TOPICAL at 06:06

## 2023-06-29 RX ADMIN — METOPROLOL TARTRATE 12.5 MG: 25 TABLET, FILM COATED ORAL at 09:06

## 2023-06-29 RX ADMIN — ONDANSETRON 4 MG: 2 INJECTION INTRAMUSCULAR; INTRAVENOUS at 04:06

## 2023-06-29 RX ADMIN — SUGAMMADEX 400 MG: 100 INJECTION, SOLUTION INTRAVENOUS at 08:06

## 2023-06-29 RX ADMIN — ALCOHOL 3 ML: 1 INJECTION, SOLUTION PERCUTANEOUS at 08:06

## 2023-06-29 RX ADMIN — PSEUDOEPHEDRINE HCL 60 MG: 30 TABLET, FILM COATED ORAL at 02:06

## 2023-06-29 RX ADMIN — ACETAMINOPHEN 1000 MG: 500 TABLET ORAL at 03:06

## 2023-06-29 RX ADMIN — OXYCODONE HYDROCHLORIDE 10 MG: 10 TABLET ORAL at 03:06

## 2023-06-29 RX ADMIN — POLYETHYLENE GLYCOL 3350 17 G: 17 POWDER, FOR SOLUTION ORAL at 02:06

## 2023-06-29 RX ADMIN — OXYMETAZOLINE HYDROCHLORIDE 2 SPRAY: 0.05 SPRAY NASAL at 03:06

## 2023-06-29 RX ADMIN — MIDAZOLAM HYDROCHLORIDE 2 MG: 2 INJECTION, SOLUTION INTRAMUSCULAR; INTRAVENOUS at 07:06

## 2023-06-29 RX ADMIN — MORPHINE SULFATE 1 MG: 2 INJECTION, SOLUTION INTRAMUSCULAR; INTRAVENOUS at 11:06

## 2023-06-29 RX ADMIN — SODIUM CHLORIDE: 0.9 INJECTION, SOLUTION INTRAVENOUS at 07:06

## 2023-06-29 RX ADMIN — ACETAMINOPHEN 1000 MG: 10 INJECTION INTRAVENOUS at 07:06

## 2023-06-29 RX ADMIN — PROPOFOL 50 MG: 10 INJECTION, EMULSION INTRAVENOUS at 07:06

## 2023-06-29 RX ADMIN — OXYCODONE HYDROCHLORIDE 5 MG: 5 TABLET ORAL at 10:06

## 2023-06-29 RX ADMIN — SENNOSIDES AND DOCUSATE SODIUM 1 TABLET: 50; 8.6 TABLET ORAL at 11:06

## 2023-06-29 RX ADMIN — MUPIROCIN 1 G: 20 OINTMENT TOPICAL at 09:06

## 2023-06-29 RX ADMIN — ONDANSETRON 4 MG: 2 INJECTION INTRAMUSCULAR; INTRAVENOUS at 07:06

## 2023-06-29 RX ADMIN — PROPOFOL 200 MG: 10 INJECTION, EMULSION INTRAVENOUS at 07:06

## 2023-06-29 RX ADMIN — SODIUM CHLORIDE, SODIUM GLUCONATE, SODIUM ACETATE, POTASSIUM CHLORIDE, MAGNESIUM CHLORIDE, SODIUM PHOSPHATE, DIBASIC, AND POTASSIUM PHOSPHATE: .53; .5; .37; .037; .03; .012; .00082 INJECTION, SOLUTION INTRAVENOUS at 07:06

## 2023-06-29 RX ADMIN — ROCURONIUM BROMIDE 80 MG: 10 INJECTION, SOLUTION INTRAVENOUS at 07:06

## 2023-06-29 RX ADMIN — CETIRIZINE HYDROCHLORIDE 5 MG: 5 TABLET, FILM COATED ORAL at 09:06

## 2023-06-29 RX ADMIN — FENTANYL CITRATE 50 MCG: 50 INJECTION, SOLUTION INTRAMUSCULAR; INTRAVENOUS at 09:06

## 2023-06-29 RX ADMIN — DEXTROSE MONOHYDRATE 2 G: 5 INJECTION INTRAVENOUS at 07:06

## 2023-06-29 RX ADMIN — FENTANYL CITRATE 100 MCG: 50 INJECTION, SOLUTION INTRAMUSCULAR; INTRAVENOUS at 07:06

## 2023-06-29 RX ADMIN — ACETAMINOPHEN 650 MG: 325 TABLET ORAL at 11:06

## 2023-06-29 RX ADMIN — ROCURONIUM BROMIDE 20 MG: 10 INJECTION, SOLUTION INTRAVENOUS at 07:06

## 2023-06-29 RX ADMIN — Medication 150 MCG/KG/MIN: at 07:06

## 2023-06-29 RX ADMIN — OXYCODONE HYDROCHLORIDE 10 MG: 10 TABLET ORAL at 07:06

## 2023-06-29 RX ADMIN — DEXMEDETOMIDINE 8 MCG: 100 INJECTION, SOLUTION, CONCENTRATE INTRAVENOUS at 08:06

## 2023-06-29 RX ADMIN — ACETAMINOPHEN 1000 MG: 500 TABLET ORAL at 09:06

## 2023-06-29 NOTE — CONSULTS
"[facility]  Endocrinology  Consult Note    Consult Requested by: Teja Byrne MD   Reason for admit: Pituitary adenoma    HISTORY OF PRESENT ILLNESS:  Bernarda Cordero is a 28 y.o. woman who presented for planned resection of a cystic sellar mass.  She became established in the [facility] endocrine clinic in April with Dr. Abbott where she also was seen by Dr. Nevarez with neurosurgery.  She was followed previously by Dr. Huerta, endocrinology, following a history of worsening galactorrhea despite cessation breast feeding and was found to have 1.4 cm cystic pituitary lesion during her workup and mildly elevated prolactin level. She denied vision compromises during that time.  She has been on Cabergoline therapy prior to her TSR.  Laboratory workup was not concerning except for history of prolactin elevation (on outside labs - see below).  Surgery today (06/29/2023) with Dr. Nevarez via TSR approach.      Endocrine is now consulted for post-op TSR monitoring of hormone levels including DI monitoring/treatment    Prior Imaging:     MRI Pituitary: 11/01/2021  Cystic and solid mass in the pituitary gland with its epicenter in the right side of the gland 1.4 x 1.2 x 1.2 cm and displaces the rest of the gland parenchyma to the left as well as the infundibulum, which enhances normally, is normal in diameter, and extends toward the hypothalamic region, which is unremarkable. This also exerts some mass effect on the most distal chiasm and proximal post chiasmatic optic nerves."  Impression: "1.4 x 1.2 x 1.2 cm primarily cystic mass arising right sided pituitary gland is most likely a cystic or necrotic pituitary microadenoma given the history     Most Recent Imaging:  MRI Pituitary: 03/20/2023  No significant change in the 1.4 x 1.2 x 1.2 cm primarily cystic mass arising right sided pituitary gland is most likely a cystic or necrotic pituitary microadenoma given the history.      Lab Results   Component " Value Date    TSH 1.65 10/03/2008     06/29/2023     06/29/2023     06/29/2023     06/20/2023    K 4.2 06/20/2023    K 4.0 02/01/2017    CALCIUM 9.2 06/20/2023    CALCIUM 9.0 02/01/2017    ALBUMIN 4.1 06/20/2023    ALBUMIN 3.6 02/01/2017    ESTGFRAFRICA >60.0 02/01/2017    EGFRNONAA >60.0 02/01/2017     Prior to surgery she was on Cabergoline 0.25 mg twice a week.        PROLACTIN LEVELS: Oct 2021 prl: 54.9; Nov 2021 prl: 48.5  June 2022 prl: 37.5 and still had galactorrhea and wasn't losing weight. STARTED CABERGOLINE.  Aug 2022 prl: 2.0  Dec 2022 prl: 9.0 and stopped her cabergoline after a Nov 2022 ER visit for palpitations  Jan 2023  prl: 31.7 Cabergoline restarted  March 2023  prl: 2.1         Medications and/or Treatments Impacting Glycemic Control:  Immunotherapy:    Immunosuppressants     None        Steroids:   Hormones (From admission, onward)    None        Pressors:    Autonomic Drugs (From admission, onward)    Start     Stop Route Frequency Ordered    06/29/23 2100  metoprolol tartrate (LOPRESSOR) split tablet 12.5 mg         -- Oral Every 12 hours 06/29/23 1236          Medications Prior to Admission   Medication Sig Dispense Refill Last Dose    albuterol (PROVENTIL/VENTOLIN HFA) 90 mcg/actuation inhaler as needed.   Past Month    bepotastine besilate (BEPREVE) 1.5 % Drop Place 1 drop into both eyes once daily. 10 mL 11 6/28/2023    BEPREVE 1.5 % Drop Place 1 drop into both eyes 2 (two) times daily. 5 mL 6 6/28/2023    cabergoline (DOSTINEX) 0.5 mg tablet Take 0.25 mg by mouth twice a week. MONDAYS AND THURSDAYS 6/28/2023    cetirizine (ZYRTEC) 5 MG tablet Take 5 mg by mouth daily as needed for Allergies.   Past Week    ergocalciferol (ERGOCALCIFEROL) 50,000 unit Cap Take 50,000 Units by mouth.   Past Week    hypochlorous acid-sodium chlor (AVENOVA) 0.01 % Spry Avenova 0.01 % topical spray   Past Week    lifitegrast (XIIDRA) 5 % Dpet Apply to eye 2 (two) times daily.    6/28/2023    metoprolol succinate (TOPROL-XL) 25 MG 24 hr tablet Take 25 mg by mouth every evening.   6/28/2023    multivitamin (THERAGRAN) per tablet Take 1 tablet by mouth once daily.   Past Week    omega-3/dha/epa/fish oil (OMEGA-3 FISH OIL ORAL) Take 1 capsule by mouth once daily.   Past Week    RESTASIS 0.05 % ophthalmic emulsion Place 1 drop into both eyes 2 (two) times daily.   6/28/2023    ibuprofen (ADVIL,MOTRIN) 100 mg/5 mL suspension ibuprofen 800 mg tablet          Current Facility-Administered Medications   Medication Dose Route Frequency Provider Last Rate Last Admin    acetaminophen tablet 1,000 mg  1,000 mg Oral Q8H Yin Drummond PA-C   1,000 mg at 06/29/23 1524    cetirizine tablet 5 mg  5 mg Oral QHS Frances Gamboa PA-C        diazePAM tablet 5 mg  5 mg Oral Q12H PRN Yin Drummond PA-C        [START ON 6/30/2023] enoxaparin injection 40 mg  40 mg Subcutaneous Q24H (prophylaxis, 1700) Frances Gamboa PA-C        hydrALAZINE injection 10 mg  10 mg Intravenous Q6H PRN Yin Drummond PA-C        labetalol 20 mg/4 mL (5 mg/mL) IV syring  10 mg Intravenous Q6H PRN Yin Drummond PA-C        metoprolol tartrate (LOPRESSOR) split tablet 12.5 mg  12.5 mg Oral Q12H Yin Drummond PA-C        mupirocin 2 % ointment   Nasal On Call Procedure Efraín Mc MD   Given at 06/29/23 0631    ondansetron injection 4 mg  4 mg Intravenous Q6H PRN Romy Sheth NP   4 mg at 06/29/23 1605    oxyCODONE immediate release tablet 5 mg  5 mg Oral Q4H PRN Frances Gamboa PA-C        oxyCODONE immediate release tablet Tab 10 mg  10 mg Oral Q4H PRN Frances Gamboa PA-C   10 mg at 06/29/23 1500    oxymetazoline 0.05 % nasal spray 2 spray  2 spray Each Nostril BID PRN Frances Gamboa PA-C   2 spray at 06/29/23 1504    polyethylene glycol packet 17 g  17 g Oral Daily Frances Gamboa PA-C   17 g at 06/29/23 1406    pseudoephedrine tablet 60 mg  60 mg Oral Q6H PRN Frances  IVETT Gamboa   60 mg at 06/29/23 1405    senna-docusate 8.6-50 mg per tablet 1 tablet  1 tablet Oral Daily Yin Drummond PA-C   1 tablet at 06/29/23 1134    [START ON 6/30/2023] sodium chloride 0.65 % nasal spray 1 spray  1 spray Each Nostril QID Frances Gamboa PA-C           Interval HPI:   Overnight events: Seen at bedside with family in room. Complaining of headache at the moment.  Trouble with voiding so straight cath has been needed by nursing staff.  Urine with gloria appearance, not entirely clear.  No steroids given as of yet.  No vision complaints or other concerns besides HA at this time.      ROS:  As above    Labs Reviewed and Include:    Lab Results   Component Value Date    WBC 7.84 06/20/2023    HGB 14.8 06/20/2023    HCT 43.7 06/20/2023    MCV 87 06/20/2023     06/20/2023       Recent Labs   Lab 06/29/23  1225        Lab Results   Component Value Date    HGBA1C 5.3 06/12/2023       Nutritional status:   Body mass index is 35.96 kg/m².  Lab Results   Component Value Date    ALBUMIN 4.1 06/20/2023    ALBUMIN 3.6 02/01/2017     No results found for: PREALBUMIN    CrCl cannot be calculated (Patient's most recent lab result is older than the maximum 7 days allowed.).      PHYSICAL EXAMINATION:  Vitals:    06/29/23 1501   BP: 103/72   Pulse: 95   Resp: 13   Temp:      Body mass index is 35.96 kg/m².     Physical Exam  Vitals reviewed.   Eyes:      Extraocular Movements: Extraocular movements intact.   Cardiovascular:      Rate and Rhythm: Tachycardia present.   Pulmonary:      Effort: Pulmonary effort is normal.   Neurological:      Mental Status: She is alert.        .     ASSESSMENT and PLAN:    Endocrine  * Pituitary adenoma  Key History and Diagnostic Findings  - Pituitary macroadenoma status-post transsphenoidal surgery on 06/29/2023, history of prolactin elevation  - Endocrine consulted for post-surgical monitoring for Central Diabetes Insipidus which can manifest within the  first five post-operative days    Plan  - Neuro check per neurology protocol, continue sinus precautions and monitoring per neurosurgery    - Avoid use of preoperative steroids (dexamethasone, hydrocortisone, methylprednisolone, etc.)  unless neurosurgery is treating for inflammation  - Draw 8:00 AM cortisol the morning after surgery to screen for adrenal insufficiency    - Monitor for diabetes insipidus with Q6Hr Urinalysis (urine specific gravity), strict monitoring of intake/output hourly, and if urine output greater than 250 cc for over 2 hours, draw BMP, serum Osm, urine Osm, urine sodium, and urinalysis stat and notify primary team and endocrine on-call  - Give desmopressin (DDAVP 10 mcg intranasal x1 dose) only if:  - Urine output greater than 250cc for more two or more consecutive hours  - and Na higher than 145 or uptrending based on prior labs (Do not give DDAVP if Na less than 137)  - and low urinary specific gravity less than 1.010  - and inability of the patient to match fluid intake reasonably  - Please page endocrine if questions    - If patient alert and has intact thirst response, encourage to drink to thirst. If significant bothersome nocturia, patients may receive intranasal DDAVP (in non-operated nostril). Contact primary team prior to giving DDAVP  - If patient altered or if patient unable to drink sufficiently to maintain euvolemia, then start DDAVP PRN. Avoid standing doses of DDAVP in patients without pre-existing DI as it may be self-limited    - With history of prolactin elevation would check prolactin level on labs the morning after surgery    Discharge Recommendations  - Discharge per transsphenoidal resection protocol when ready from a surgical standpoint  - Obtain 8:00 AM Cortisol on day of discharge if patient off other steroids for over 24 hours. If 8:00 AM Cortisol is less <8, give Hydrocortisone (20mg am / 10 mg pm) on discharge  - Other pituitary hormones can be monitored  outpatient in pituitary clinic  - Will require outpatient CMP 7-10 days post-operatively to monitor for SIADH or persistent DI  - Follow up in Pituitary Clinic with Dr. Abbott in 2 weeks, we will set up appointment        Geovany Vuong, DO  Endocrinology  [facility]

## 2023-06-29 NOTE — ASSESSMENT & PLAN NOTE
Key History and Diagnostic Findings  - Pituitary macroadenoma status-post transsphenoidal surgery on 06/29/2023, history of prolactin elevation  - Endocrine consulted for post-surgical monitoring for Central Diabetes Insipidus which can manifest within the first five post-operative days    Plan  - Neuro check per neurology protocol, continue sinus precautions and monitoring per neurosurgery    - Avoid use of preoperative steroids (dexamethasone, hydrocortisone, methylprednisolone, etc.)  unless neurosurgery is treating for inflammation  - Draw 8:00 AM cortisol the morning after surgery to screen for adrenal insufficiency    - Monitor for diabetes insipidus with Q6Hr Urinalysis (urine specific gravity), strict monitoring of intake/output hourly, and if urine output greater than 250 cc for over 2 hours, draw BMP, serum Osm, urine Osm, urine sodium, and urinalysis stat and notify primary team and endocrine on-call  - Give desmopressin (DDAVP 10 mcg intranasal x1 dose) only if:  - Urine output greater than 250cc for more two or more consecutive hours  - and Na higher than 145 or uptrending based on prior labs (Do not give DDAVP if Na less than 137)  - and low urinary specific gravity less than 1.010  - and inability of the patient to match fluid intake reasonably  - Please page endocrine if questions    - If patient alert and has intact thirst response, encourage to drink to thirst. If significant bothersome nocturia, patients may receive intranasal DDAVP (in non-operated nostril). Contact primary team prior to giving DDAVP  - If patient altered or if patient unable to drink sufficiently to maintain euvolemia, then start DDAVP PRN. Avoid standing doses of DDAVP in patients without pre-existing DI as it may be self-limited    - With history of prolactin elevation would check prolactin level on labs the morning after surgery    Discharge Recommendations  - Discharge per transsphenoidal resection protocol when ready from  a surgical standpoint  - Obtain 8:00 AM Cortisol on day of discharge if patient off other steroids for over 24 hours. If 8:00 AM Cortisol is less <8, give Hydrocortisone (20mg am / 10 mg pm) on discharge  - Other pituitary hormones can be monitored outpatient in pituitary clinic  - Will require outpatient CMP 7-10 days post-operatively to monitor for SIADH or persistent DI  - Follow up in Pituitary Clinic with Dr. Abbott in 2 weeks, we will set up appointment

## 2023-06-29 NOTE — PLAN OF CARE
[facility] Care Plan    POC reviewed with Bernarda Cordero and family. Pt verbalized understanding. Questions and concerns addressed. No acute events today. Pt progressing toward goals. Will continue to monitor. See below and flowsheets for full assessment and VS info.     -went to post op CT   -trending UA and Na  -PRN pain medication          Is this a stroke patient? no    Neuro:  [place] Coma Scale  Best Eye Response: 4-->(E4) spontaneous  Best Motor Response: 6-->(M6) obeys commands  Best Verbal Response: 5-->(V5) oriented  [place] Coma Scale Score: 15  Assessment Qualifiers: patient not sedated/intubated  Pupil PERRLA: yes     24 hr Temp:  [97 °F (36.1 °C)-98.5 °F (36.9 °C)]     CV:   Rhythm: sinus bradycardia  BP goals:   SBP < 160  MAP > 65    Resp:           Plan: N/A    GI/:     Diet/Nutrition Received: regular  Last Bowel Movement: 06/29/23       Intake/Output Summary (Last 24 hours) at 6/29/2023 1834  Last data filed at 6/29/2023 1401  Gross per 24 hour   Intake 1200 ml   Output 350 ml   Net 850 ml          Labs/Accuchecks:  No results for input(s): WBC, RBC, HGB, HCT, PLT in the last 168 hours.   Recent Labs   Lab 06/29/23  1225 06/29/23  1551     --    CREATININE  --  0.7    No results for input(s): PROTIME, INR, APTT, HEPANTIXA in the last 168 hours. No results for input(s): CPK, CPKMB, TROPONINI, MB in the last 168 hours.    Electrolytes: N/A - electrolytes WDL  Accuchecks: none    Gtts:      LDA/Wounds:  Lines/Drains/Airways       Peripheral Intravenous Line  Duration                  Peripheral IV - Single Lumen 06/29/23 0600 20 G Anterior;Proximal;Right Forearm <1 day         Peripheral IV - Single Lumen 06/29/23 0727 18 G Left Forearm <1 day                  Wounds: No  Wound care consulted: No

## 2023-06-29 NOTE — ASSESSMENT & PLAN NOTE
27 y/o F presented with new onset galactorrhea following pregnancy and found to have 1.4 cm pituitary lesion s/p TSR 6/29.  -Admit to [facility]  -NSGY, endo following  -q1h neuro checks, vital checks  -EKG, ECHO, CXR  -A1c, TSH, lipid panel, coag panel  -Daily CBC, CMP, mag, phos  -q6h Na, UA, strict hourly I/O  -SBP < 140, prn labetalol and hydralazine  -SCDs, hold DVT chemoppx in acute post op setting  -CT post op complete  -PT/OT/SLP as appropriate

## 2023-06-29 NOTE — ANESTHESIA PROCEDURE NOTES
Intubation    Date/Time: 6/29/2023 7:25 AM  Performed by: Frida Chino MD  Authorized by: Kevan Shetty MD     Intubation:     Induction:  Intravenous    Intubated:  Postinduction    Mask Ventilation:  Easy with oral airway    Attempts:  1    Attempted By:  Resident anesthesiologist    Method of Intubation:  Direct    Blade:  Dye 2    Laryngeal View Grade: Grade I - full view of cords      Difficult Airway Encountered?: No      Complications:  None    Airway Device:  Oral endotracheal tube    Airway Device Size:  7.0    Style/Cuff Inflation:  Cuffed (inflated to minimal occlusive pressure)    Tube secured:  21    Secured at:  The lips    Placement Verified By:  Capnometry    Complicating Factors:  None    Findings Post-Intubation:  BS equal bilateral

## 2023-06-29 NOTE — PLAN OF CARE
[facility] - Neuro Critical Care  Initial Discharge Assessment       Primary Care Provider: Primary Doctor No    Admission Diagnosis: Pituitary adenoma [D35.2]    Admission Date: 6/29/2023  Expected Discharge Date: 7/1/2023    Transition of Care Barriers: None    Payor: Levine, Susan. \Hospital Has a New Name and Outlook.\"" RESOURCES / Plan: [facility] HEALTH / Product Type: Commercial /     Extended Emergency Contact Information  Primary Emergency Contact: Prashant Cordero  Address: [address]  Mobile Phone: [phone]  Relation: Spouse  Secondary Emergency Contact: Yelitza Ly  Address: [address] [place] [address] [place]  Mobile Phone: [phone]  Relation: Mother    Discharge Plan A: Home with family  Discharge Plan B: Home with family      [id number] [place], [place] - [address] AT CORNER OF [facility]  [address] [facility]  [place] [address]  Phone: [phone] Fax: [phone]    [id number] [place] - [address]  [address] [facility] [address]  Phone: [phone] Fax: [phone]    [id number] - [place], [place] - [address] AT [facility] [address]  [address]  [place] [address]  Phone: [phone] Fax: [phone]        Transferred from:  home    Past Medical History:   Diagnosis Date    ADD (attention deficit disorder)     ADHD (attention deficit hyperactivity disorder)     Anxiety     Hx of psychiatric care     Obesity     Psychiatric exam requested by [facility]     Psychiatric problem     Sleep difficulties     Therapy          CM met with patient and Prashant Flores () [phone] in room for Discharge Planning Assessment.  Patient is able to answer questions.  Per patient, she lives with her  in a single story house with 0 step(s) to enter.   Per patient, she was independent with ADLS and used no dme for  ambulation.  Patient will have assistance from her  upon discharge.   Discharge Planning Booklet given to patient/family and discussed.  All questions addressed.  CM will follow for needs.      Initial Assessment (most recent)       Adult Discharge Assessment - 06/29/23 1527          Discharge Assessment    Assessment Type Discharge Planning Assessment     Confirmed/corrected address, phone number and insurance Yes     Confirmed Demographics Correct on Facesheet     Source of Information patient     Communicated RAMY with patient/caregiver Date not available/Unable to determine     Reason For Admission Pituitary adenoma     People in Home spouse     Facility Arrived From: home     Do you expect to return to your current living situation? No     Do you have help at home or someone to help you manage your care at home? No     Prior to hospitilization cognitive status: Alert/Oriented     Current cognitive status: Alert/Oriented     Walking or Climbing Stairs --   independent    Dressing/Bathing --   indendent    Home Accessibility stairs to enter home     Number of Stairs, Main Entrance none     Stairs Comment, Main Entrance none     Home Layout Able to live on 1st floor     Equipment Currently Used at Home none     Readmission within 30 days? No     Patient currently being followed by outpatient case management? No     Do you currently have service(s) that help you manage your care at home? No     Do you take prescription medications? Yes     Do you have prescription coverage? Yes     Coverage [employer]     Do you have any problems affording any of your prescribed medications? No     Is the patient taking medications as prescribed? yes     Who is going to help you get home at discharge? Prashant Flores () [phone]     How do you get to doctors appointments? family or friend will provide;car, drives self     Are you on dialysis? No     Do you take coumadin? No     Discharge Plan A Home with family      Discharge Plan B Home with family     DME Needed Upon Discharge  none     Discharge Plan discussed with: Patient     Transition of Care Barriers None        Physical Activity    On average, how many days per week do you engage in moderate to strenuous exercise (like a brisk walk)? 4 days     On average, how many minutes do you engage in exercise at this level? 30 min        Financial Resource Strain    How hard is it for you to pay for the very basics like food, housing, medical care, and heating? Not hard at all        Housing Stability    In the last 12 months, was there a time when you were not able to pay the mortgage or rent on time? No     In the last 12 months, how many places have you lived? 1     In the last 12 months, was there a time when you did not have a steady place to sleep or slept in a shelter (including now)? No        Transportation Needs    In the past 12 months, has lack of transportation kept you from medical appointments or from getting medications? No     In the past 12 months, has lack of transportation kept you from meetings, work, or from getting things needed for daily living? No        Food Insecurity    Within the past 12 months, you worried that your food would run out before you got the money to buy more. Never true     Within the past 12 months, the food you bought just didn't last and you didn't have money to get more. Never true        Stress    Do you feel stress - tense, restless, nervous, or anxious, or unable to sleep at night because your mind is troubled all the time - these days? Not at all        Social Connections    In a typical week, how many times do you talk on the phone with family, friends, or neighbors? More than three times a week     How often do you get together with friends or relatives? Three times a week     How often do you attend Hoahaoism or Christianity services? More than 4 times per year     Do you belong to any clubs or organizations such as Hoahaoism groups,  unions, fraternal or athletic groups, or school groups? No     How often do you attend meetings of the clubs or organizations you belong to? Never     Are you , , , , never , or living with a partner?         Alcohol Use    Q1: How often do you have a drink containing alcohol? Never     Q2: How many drinks containing alcohol do you have on a typical day when you are drinking? Patient does not drink     Q3: How often do you have six or more drinks on one occasion? Never        OTHER    Name(s) of People in Home Prashant Flores () [phone]                        Jyoti Romero RN, CCRN-K, [facility]  Neuro-Critical Care   X [phone]

## 2023-06-29 NOTE — HPI
"Bernarda Cordero is a 28 y.o. woman who presented for planned resection of a cystic sellar mass.  She became established in the [facility] endocrine clinic in April with Dr. Abbott where she also was seen by Dr. Nevarez with neurosurgery.  She was followed previously by Dr. Huerta, endocrinology, following a history of worsening galactorrhea despite cessation breast feeding and was found to have 1.4 cm cystic pituitary lesion during her workup and mildly elevated prolactin level. She denied vision compromises during that time.  She has been on Cabergoline therapy prior to her TSR.  Laboratory workup was not concerning except for history of prolactin elevation (on outside labs - see below).  Surgery today (06/29/2023) with Dr. Nevarez via TSR approach.      Endocrine is now consulted for post-op TSR monitoring of hormone levels including DI monitoring/treatment    Prior Imaging:     MRI Pituitary: 11/01/2021  Cystic and solid mass in the pituitary gland with its epicenter in the right side of the gland 1.4 x 1.2 x 1.2 cm and displaces the rest of the gland parenchyma to the left as well as the infundibulum, which enhances normally, is normal in diameter, and extends toward the hypothalamic region, which is unremarkable. This also exerts some mass effect on the most distal chiasm and proximal post chiasmatic optic nerves."  Impression: "1.4 x 1.2 x 1.2 cm primarily cystic mass arising right sided pituitary gland is most likely a cystic or necrotic pituitary microadenoma given the history     Most Recent Imaging:  MRI Pituitary: 03/20/2023  No significant change in the 1.4 x 1.2 x 1.2 cm primarily cystic mass arising right sided pituitary gland is most likely a cystic or necrotic pituitary microadenoma given the history.      Lab Results   Component Value Date    TSH 1.65 10/03/2008     06/29/2023     06/29/2023     06/29/2023     06/20/2023    K 4.2 06/20/2023    K 4.0 02/01/2017    CALCIUM 9.2 " 06/20/2023    CALCIUM 9.0 02/01/2017    ALBUMIN 4.1 06/20/2023    ALBUMIN 3.6 02/01/2017    ESTGFRAFRICA >60.0 02/01/2017    EGFRNONAA >60.0 02/01/2017     Prior to surgery she was on Cabergoline 0.25 mg twice a week.        PROLACTIN LEVELS: Oct 2021 prl: 54.9; Nov 2021 prl: 48.5  June 2022 prl: 37.5 and still had galactorrhea and wasn't losing weight. STARTED CABERGOLINE.  Aug 2022 prl: 2.0  Dec 2022 prl: 9.0 and stopped her cabergoline after a Nov 2022 ER visit for palpitations  Jan 2023  prl: 31.7 Cabergoline restarted  March 2023  prl: 2.1

## 2023-06-29 NOTE — OP NOTE
DATE OF PROCEDURE:  6/29/2023     SURGEON:  Hilario Nevarez M.D., Ph.D.     ASSISTANT: Boo Hodge M.D.(RES) (the assistant is a [facility] resident).     PREOPERATIVE DIAGNOSIS:    1.  Pituitary adenoma.  2.  Elevated prolactin     POSTOPERATIVE DIAGNOSIS:    1. Pituitary adenoma.  2. Elevated prolactin     PROCEDURES PERFORMED:  1.  Endonasal transsphenoidal resection of pituitary tumor.  2.  Stealth navigation.    SPECIMEN: Brain tumor.     INDICATIONS IN DETAIL:    Ms. Bernarda Cordero is a 28 y.o. 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 This is a patient who initially presented with worsening galactorrhea after her first pregnancy. She was found to have 1.4 cm cystic pituitary lesion during her workup. Denies vision compromises during that time. She attempted cabergoline with routine monitoring at which the lesion remained stable. HVF done in 2022 WNL. Upon evaluation today, the pt continues to take cabergoline. She has mildly irregular cycles, but no further lactation. Prolactin mildly elevated.  MRI Pituitary W WO Contrast (3/20/2023): 1.4 x 1.2 x 1.2 cm primarily cystic mass arising right sided pituitary gland.  I have recommended TSR. I have discussed the risks/benefits, indications, and alternatives for the proposed procedure in detail. I have answered all of their questions and patient wish to proceed with surgery.     PROCEDURE IN DETAIL:    The patient was brought to the Operating Room and a general anesthetic was administered.  All proper lines were placed. The patient was placed in the semi-sitting position and his head was turned to the right in slight extension.  The patient's head was placed in 3-point fixation using a Mg headholder.  The Stealth navigation system was brought to the field and an accurate registration was achieved using the tracer function.  The patient had a CT prior to surgery and this was used for the navigation purposes.  The patient's  nasal cavity, face and abdomen were cleaned, prepped and draped in the usual fashion.  The patient had Afrin-soaked pledgets placed in his nasal cavities bilaterally. These were removed.  We then entered the right nasal cavity using the handheld nasal speculum and followed the septum back towards the pituitary fossa.  We used Stealth navigation to verify that we were indeed on an accurate trajectory.  We followed the nasal septum back to the bony rostrum of the sphenoid sinus. We then fractured the nasal septum medially and dissected the mucosa from both sides of the bony rostrum.  An endonasal retractor was then placed.  We again verified our trajectory.  Then, using a pituitary rongeur as well as Kerrison rongeurs, we made an opening into the sphenoid sinus which would allow us access to the tumor.  Upon entering the sphenoid sinus, we irrigated copiously.  The mucosa was removed from the covering of the pituitary fossa. We removed a window of bone from the pituitary fossa to allow us access.  At this point, this allowed us access to the dura.  The wound was irrigated copiously and all bleeding points were coagulated. We made an incision in the dura in a rectangular fashion.  Upon opening the dura, the tumor began to herniate outward.  We were able to see the gland to the left.  Using ring curette we were able to remove the tumor from the right side of the pituitary fossa completely.  Once we had done this, we coagulated the edge of the gland.  A layer of this was removed also.  After alcohol was then placed in the cavity for approximately 1 minute.  This was removed.  Gelfoam was placed where the tumor had been.   A resorbable plate was then placed in as a buttress to hold the fat in place.  Evicel was then placed over this area. There was no evidence of any CSF leak during this entire surgery.  The wound was irrigated copiously.  All bleeding points were again coagulated.  The self-retaining endonasal retractor  was removed.  We re-entered the left nasal cavity.  The septum was medialized.  The middle turbinate was also medialized and Afrin-soaked pledgets were placed behind it.  We re-entered the right nasal cavity using a handheld nasal speculum and performed the same maneuver. This left the septum in the midline.  At this point, the patient was taken out of 3-point fixation.  Her face was cleaned and the patient was awakened by the Anesthesia staff.  At the point the patient was awake and following commands, she was extubated.     The Afrin-soaked pledgets were removed just prior to extubation.  The patient was then transferred to the ICU in excellent condition.  EBL was approximately 25 mL.      Specimen included brain tumor for permanent evaluation.      There were no intraprocedural complications.     All counts were correct at the end of surgery.     Dr. Hilario Nevarez was present during the entire procedure.

## 2023-06-29 NOTE — SUBJECTIVE & OBJECTIVE
Past Medical History:   Diagnosis Date    ADD (attention deficit disorder)     ADHD (attention deficit hyperactivity disorder)     Anxiety     Hx of psychiatric care     Obesity     Psychiatric exam requested by authority     Psychiatric problem     Sleep difficulties     Therapy      Past Surgical History:   Procedure Laterality Date    APPENDECTOMY      2012    CYST REMOVAL      left ring finger- Mid school      No current facility-administered medications on file prior to encounter.     Current Outpatient Medications on File Prior to Encounter   Medication Sig Dispense Refill    albuterol (PROVENTIL/VENTOLIN HFA) 90 mcg/actuation inhaler as needed.      bepotastine besilate (BEPREVE) 1.5 % Drop Place 1 drop into both eyes once daily. 10 mL 11    BEPREVE 1.5 % Drop Place 1 drop into both eyes 2 (two) times daily. 5 mL 6    cabergoline (DOSTINEX) 0.5 mg tablet Take 0.25 mg by mouth twice a week. MONDAYS AND THURSDAYS      ergocalciferol (ERGOCALCIFEROL) 50,000 unit Cap Take 50,000 Units by mouth.      hypochlorous acid-sodium chlor (AVENOVA) 0.01 % Spry Avenova 0.01 % topical spray      lifitegrast (XIIDRA) 5 % Dpet Apply to eye 2 (two) times daily.      metoprolol succinate (TOPROL-XL) 25 MG 24 hr tablet Take 25 mg by mouth every evening.      RESTASIS 0.05 % ophthalmic emulsion Place 1 drop into both eyes 2 (two) times daily.      dextroamphetamine-amphetamine (ADDERALL XR) 30 MG 24 hr capsule Take 1 capsule (30 mg total) by mouth every morning. 45 capsule 0    ibuprofen (ADVIL,MOTRIN) 100 mg/5 mL suspension ibuprofen 800 mg tablet        Allergies: Sulfa (sulfonamide antibiotics)    Family History   Adopted: Yes   Problem Relation Age of Onset    Colon cancer Neg Hx     Ovarian cancer Neg Hx     Breast cancer Neg Hx      Social History     Tobacco Use    Smoking status: Never    Smokeless tobacco: Never   Substance Use Topics    Alcohol use: No    Drug use: No     Review of Systems   Constitutional:  Negative  for chills, fatigue and fever.   HENT:  Negative for congestion, nosebleeds and postnasal drip.    Eyes:  Negative for photophobia, redness and visual disturbance.   Respiratory:  Negative for cough, shortness of breath and wheezing.    Cardiovascular:  Negative for chest pain, palpitations and leg swelling.   Gastrointestinal:  Negative for constipation, nausea and vomiting.   Genitourinary:  Negative for difficulty urinating, hematuria and urgency.   Neurological:  Positive for headaches. Negative for dizziness, seizures, facial asymmetry, weakness, light-headedness and numbness.   Objective:     Vitals:    Temp: 97.2 °F (36.2 °C)  Pulse: 71  BP: 124/74  MAP (mmHg): 94  Resp: 20  SpO2: 98 %    Temp  Min: 97 °F (36.1 °C)  Max: 97.8 °F (36.6 °C)  Pulse  Min: 61  Max: 89  BP  Min: 113/77  Max: 124/74  MAP (mmHg)  Min: 86  Max: 94  Resp  Min: 12  Max: 20  SpO2  Min: 98 %  Max: 100 %    No intake/output data recorded.            Physical Exam  Constitutionally: Patient resting comfortably. No acute distress.   HEENT: normocephalic, atraumatic. Extraocular movement intact. Anicteric, no conjunctival injection.  Neck: No tracheal deviation. No gross lymphadenopathy.  CV: regular rate, extremities well perfused.  Pulm: No respiratory distress on room air. Equal chest rise bilaterally.  Abdomen: No appreciable distention or ascites.  MSK: No obvious deformities  Skin: No appreciable rashes or jaundice.       Neurologic:  [id number]  Follows all commands  AAOx3, though responses delayed  PERRL, EOMI  Visual fields appear intact  Pupils brisk  SILT  RAMÍREZ and follows all commands       Today I personally reviewed pertinent medications, lines/drains/airways, imaging, cardiology results, laboratory results, microbiology results, notably:

## 2023-06-29 NOTE — HPI
Ms. Bernarda Cordero is a 28 y.o. woman with a PMHx of galactorrhea following pregnancy who presents to [facility] s/p transphenoidal resection of pituitary adenoma.  She was initially referred to NSGY by Dr. Huerta, endocrinology, following the galactorrhea and was  found to have 1.4 cm cystic pituitary lesion during her workup and mildly elevated prolactin level. She denied vision compromises during that time.     She will be admitted to [facility] for hourly neuro monitoring and a higher level of care.

## 2023-06-29 NOTE — PROGRESS NOTES
Team notified of pt reporting nasal pain postop, telephone order with readback from Dr. Byrne for oxycodone 5 mg PO now.

## 2023-06-29 NOTE — BRIEF OP NOTE
[facility] - Surgery ([facility])  Brief Operative Note    SUMMARY     Surgery Date: 6/29/2023     Surgeon(s) and Role:     * Hilario Oliveira MD - Primary     * Boo Hodge MD - Resident - Assisting        Pre-op Diagnosis:  Pituitary adenoma [D35.2]    Post-op Diagnosis:  Post-Op Diagnosis Codes:     * Pituitary adenoma [D35.2]    Procedure(s) (LRB):  HYPOPHYSECTOMY, TRANSSPHENOIDAL APPROACH (N/A)    Anesthesia: General    Operative Findings: transsphenoidal approach for prolactinoma resection    Estimated Blood Loss: * No values recorded between 6/29/2023  7:56 AM and 6/29/2023  9:08 AM *    Estimated Blood Loss has been documented.         Specimens:   Specimen (24h ago, onward)       Start     Ordered    06/29/23 0820  Specimen to Pathology, Surgery Neurosurgery  Once        Comments: Pre-op Diagnosis: Pituitary adenoma [D35.2]Procedure(s):HYPOPHYSECTOMY, TRANSSPHENOIDAL APPROACH Number of specimens: 1Name of specimens: 1. Brain tumor - Permanent     References:    Click here for ordering Quick Tip   Question Answer Comment   Procedure Type: Neurosurgery    Which provider would you like to cc? HILARIO OLIVEIRA    Release to patient Immediate        06/29/23 0848                    [id number]

## 2023-06-29 NOTE — H&P
[facility] - Neuro Critical Care  Neurocritical Care  History & Physical    Admit Date: 6/29/2023  Service Date: 06/29/2023  Length of Stay: 0    Subjective:     Chief Complaint: Pituitary adenoma    History of Present Illness: Ms. Bernarda Cordero is a 28 y.o. woman with a PMHx of galactorrhea following pregnancy who presents to [facility] s/p transphenoidal resection of pituitary adenoma.  She was initially referred to NSGY by Dr. Huerta, endocrinology, following the galactorrhea and was  found to have 1.4 cm cystic pituitary lesion during her workup and mildly elevated prolactin level. She denied vision compromises during that time.     She will be admitted to [facility] for hourly neuro monitoring and a higher level of care.       Past Medical History:   Diagnosis Date    ADD (attention deficit disorder)     ADHD (attention deficit hyperactivity disorder)     Anxiety     Hx of psychiatric care     Obesity     Psychiatric exam requested by authority     Psychiatric problem     Sleep difficulties     Therapy      Past Surgical History:   Procedure Laterality Date    APPENDECTOMY      2012    CYST REMOVAL      left ring finger- Mid school      No current facility-administered medications on file prior to encounter.     Current Outpatient Medications on File Prior to Encounter   Medication Sig Dispense Refill    albuterol (PROVENTIL/VENTOLIN HFA) 90 mcg/actuation inhaler as needed.      bepotastine besilate (BEPREVE) 1.5 % Drop Place 1 drop into both eyes once daily. 10 mL 11    BEPREVE 1.5 % Drop Place 1 drop into both eyes 2 (two) times daily. 5 mL 6    cabergoline (DOSTINEX) 0.5 mg tablet Take 0.25 mg by mouth twice a week. MONDAYS AND THURSDAYS      ergocalciferol (ERGOCALCIFEROL) 50,000 unit Cap Take 50,000 Units by mouth.      hypochlorous acid-sodium chlor (AVENOVA) 0.01 % Spry Avenova 0.01 % topical spray      lifitegrast (XIIDRA) 5 % Dpet Apply to eye 2 (two) times daily.      metoprolol succinate  (TOPROL-XL) 25 MG 24 hr tablet Take 25 mg by mouth every evening.      RESTASIS 0.05 % ophthalmic emulsion Place 1 drop into both eyes 2 (two) times daily.      dextroamphetamine-amphetamine (ADDERALL XR) 30 MG 24 hr capsule Take 1 capsule (30 mg total) by mouth every morning. 45 capsule 0    ibuprofen (ADVIL,MOTRIN) 100 mg/5 mL suspension ibuprofen 800 mg tablet        Allergies: Sulfa (sulfonamide antibiotics)    Family History   Adopted: Yes   Problem Relation Age of Onset    Colon cancer Neg Hx     Ovarian cancer Neg Hx     Breast cancer Neg Hx      Social History     Tobacco Use    Smoking status: Never    Smokeless tobacco: Never   Substance Use Topics    Alcohol use: No    Drug use: No     Review of Systems   Constitutional:  Negative for chills, fatigue and fever.   HENT:  Negative for congestion, nosebleeds and postnasal drip.    Eyes:  Negative for photophobia, redness and visual disturbance.   Respiratory:  Negative for cough, shortness of breath and wheezing.    Cardiovascular:  Negative for chest pain, palpitations and leg swelling.   Gastrointestinal:  Negative for constipation, nausea and vomiting.   Genitourinary:  Negative for difficulty urinating, hematuria and urgency.   Neurological:  Positive for headaches. Negative for dizziness, seizures, facial asymmetry, weakness, light-headedness and numbness.   Objective:     Vitals:    Temp: 97.2 °F (36.2 °C)  Pulse: 71  BP: 124/74  MAP (mmHg): 94  Resp: 20  SpO2: 98 %    Temp  Min: 97 °F (36.1 °C)  Max: 97.8 °F (36.6 °C)  Pulse  Min: 61  Max: 89  BP  Min: 113/77  Max: 124/74  MAP (mmHg)  Min: 86  Max: 94  Resp  Min: 12  Max: 20  SpO2  Min: 98 %  Max: 100 %    No intake/output data recorded.            Physical Exam  Constitutionally: Patient resting comfortably. No acute distress.   HEENT: normocephalic, atraumatic. Extraocular movement intact. Anicteric, no conjunctival injection.  Neck: No tracheal deviation. No gross lymphadenopathy.  CV:  regular rate, extremities well perfused.  Pulm: No respiratory distress on room air. Equal chest rise bilaterally.  Abdomen: No appreciable distention or ascites.  MSK: No obvious deformities  Skin: No appreciable rashes or jaundice.       Neurologic:  [id number]  Follows all commands  AAOx3, though responses delayed  PERRL, EOMI  Visual fields appear intact  Pupils brisk  SILT  RAMÍREZ and follows all commands       Today I personally reviewed pertinent medications, lines/drains/airways, imaging, cardiology results, laboratory results, microbiology results, notably:          Assessment/Plan:     Psychiatric  Anxiety disorder  Resume home prn valium    Cardiac/Vascular  History of sinus tachycardia  Resume home lopressor    Endocrine  * Pituitary adenoma  29 y/o F presented with new onset galactorrhea following pregnancy and found to have 1.4 cm pituitary lesion s/p TSR 6/29.  -Admit to NCC  -NSGY, endo following  -q1h neuro checks, vital checks  -EKG, ECHO, CXR  -A1c, TSH, lipid panel, coag panel  -Daily CBC, CMP, mag, phos  -q6h Na, UA, strict hourly I/O  -SBP < 140, prn labetalol and hydralazine  -SCDs, hold DVT chemoppx in acute post op setting  -CT post op complete  -PT/OT/SLP as appropriate            The patient is being Prophylaxed for:  Venous Thromboembolism with: Mechanical  Stress Ulcer with: None  Ventilator Pneumonia with: none    Activity Orders          Diet Adult Regular (IDDSI Level 7): Regular starting at 06/29 1113        No Order     Critical condition in that Patient has a condition that poses threat to life and bodily function: Pituitary mass resection     45 minutes of Critical care time was spent personally by me on the following activities: development of treatment plan with patient or surrogate and bedside caregivers, discussions with consultants, evaluation of patient's response to treatment, examination of patient, ordering and performing treatments and interventions, ordering and review of  laboratory studies, ordering and review of radiographic studies, pulse oximetry, antibiotic titration if applicable, vasopressor titration if applicable, re-evaluation of patient's condition. This critical care time did not overlap with that of any other provider or involve time for any procedures. There is high probability for acute neurological change leading to clinical and possibly life-threatening deterioration requiring highest level of physician preparedness for urgent intervention.    Yin Drummond PA-C  Neurocritical Care  [facility] - Neuro Critical Care

## 2023-06-29 NOTE — H&P
Please see below note from neurosurgery clinic. Agree with assessment and plans.        Boo Hodge  NSGY PGY2        Subjective:   I, Rochelle Goodwin, attest that this documentation has been prepared under the direction and in the presence of Hilario Nevarez MD.      Patient ID: Bernarda Cordero is a 28 y.o. female      Chief Complaint: No chief complaint on file.        HPI  Ms. 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 Huerta, endocrinology, who presents today to [facility] care. This is a patient who initially presented with worsening galactorrhea after her first pregnancy. She was found to have 1.4 cm cystic pituitary lesion during her workup. Denies vision compromises during that time. She attempted cabergoline with routine monitoring at which the lesion remained stable. HVF done in 2022 WNL. Upon evaluation today, the pt continues to take cabergoline. She has mildly irregular cycles, but no further lactation. Prolactin mildly elevated.     Review of Systems   Constitutional:  Negative for activity change, appetite change, fatigue, fever and unexpected weight change.   HENT:  Negative for facial swelling.    Eyes: Negative.    Respiratory: Negative.     Cardiovascular: Negative.    Gastrointestinal:  Negative for diarrhea, nausea and vomiting.   Endocrine: Negative.    Genitourinary: Negative.    Musculoskeletal:  Negative for back pain, joint swelling, myalgias and neck pain.   Neurological:  Negative for dizziness, seizures, weakness, numbness and headaches.   Psychiatric/Behavioral: Negative.              Past Medical History:   Diagnosis Date    ADD (attention deficit disorder)      ADHD (attention deficit hyperactivity disorder)      Anxiety      Hx of psychiatric care      Obesity      Psychiatric exam requested by authority      Psychiatric problem      Sleep difficulties      Therapy           Objective:          Vitals:     04/04/23 0959   BP: 104/68   Pulse: 87       Physical Exam  Constitutional:       General: She is not in acute distress.     Appearance: Normal appearance.   HENT:      Head: Normocephalic and atraumatic.   Pulmonary:      Effort: Pulmonary effort is normal.   Musculoskeletal:      Cervical back: Neck supple.   Neurological:      Mental Status: She is alert and oriented to person, place, and time.      GCS: GCS eye subscore is 4. GCS verbal subscore is 5. GCS motor subscore is 6.      Cranial Nerves: No cranial nerve deficit.         IMAGING:  MRI Pituitary W WO Contrast (3/20/2023):  1. No significant change in the 1.4 x 1.2 x 1.2 cm primarily cystic mass arising right sided pituitary gland is most likely a cystic or necrotic pituitary microadenoma given the history.   2. Several nonenhancing abnormal signal foci all less than 5 mm in diameter are present in in the bifrontal and posterior left temporal white matter and are unchanged, but one in the right frontal subcortical white matter is slightly increased in size. These foci are nonspecific, but given the patient's age and sex, differential considerations include focal gliosis, migraine headaches occurring at time of exam, demyelinating processes, white matter microvascular ischemic changes, Lyme disease, and vasculitides.   3. Acute bilateral ethmoid sinusitis superimposed on scattered bilateral chronic sinus disease.         I have personally reviewed the images with the pt.       I, Dr. Hilario Nevarez, personally performed the services described in this documentation. All medical record entries made by the scribe, Rochelle Goodwin, were at my direction and in my presence.  I have reviewed the chart and agree that the record reflects my personal performance and is accurate and complete. Hilario Nevarez MD. 04/04/2023     Assessment:       Pituitary tumor     Plan:   I have personally reviewed the MRI pituitary with the pt which shows:  1. No significant change in the 1.4 x 1.2 x 1.2 cm primarily cystic mass  arising right sided pituitary gland is most likely a cystic or necrotic pituitary microadenoma given the history.   2. Several nonenhancing abnormal signal foci all less than 5 mm in diameter are present in in the bifrontal and posterior left temporal white matter and are unchanged, but one in the right frontal subcortical white matter is slightly increased in size. These foci are nonspecific, but given the patient's age and sex, differential considerations include focal gliosis, migraine headaches occurring at time of exam, demyelinating processes, white matter microvascular ischemic changes, Lyme disease, and vasculitides.   3. Acute bilateral ethmoid sinusitis superimposed on scattered bilateral chronic sinus disease.      I recommend TSR. I have discussed the risks/benefits, indications, and alternatives for the proposed procedure in detail. I have answered all of their questions and patient wish to proceed with surgery. We will schedule patient.       Electronically signed by Hilario Nevarez MD at 4/4/2023 12:35 PM

## 2023-06-29 NOTE — PROGRESS NOTES
Patient arrived to [facility] from [facility] OR>>[id number]    Report received from: CRNA,      Type of stroke/diagnosis:  s/p transphenoidal res    Current symptoms: immediately postop    Skin assessment done: yes  Wounds noted: none    *If wounds noted, was Wound Care consulted? N/a    Ham Completed? pending    Patient Belongings on Admit: with family, none    NCC notified: Romy Sheth np

## 2023-06-29 NOTE — SUBJECTIVE & OBJECTIVE
Interval HPI:   Overnight events: Seen at bedside with family in room. Complaining of headache at the moment.  Trouble with voiding so straight cath has been needed by nursing staff.  Urine with gloria appearance, not entirely clear.  No steroids given as of yet.  No vision complaints or other concerns besides HA at this time.      ROS:  As above    Labs Reviewed and Include:    Lab Results   Component Value Date    WBC 7.84 06/20/2023    HGB 14.8 06/20/2023    HCT 43.7 06/20/2023    MCV 87 06/20/2023     06/20/2023       Recent Labs   Lab 06/29/23  1225        Lab Results   Component Value Date    HGBA1C 5.3 06/12/2023       Nutritional status:   Body mass index is 35.96 kg/m².  Lab Results   Component Value Date    ALBUMIN 4.1 06/20/2023    ALBUMIN 3.6 02/01/2017     No results found for: PREALBUMIN    CrCl cannot be calculated (Patient's most recent lab result is older than the maximum 7 days allowed.).      PHYSICAL EXAMINATION:  Vitals:    06/29/23 1501   BP: 103/72   Pulse: 95   Resp: 13   Temp:      Body mass index is 35.96 kg/m².     Physical Exam  Vitals reviewed.   Eyes:      Extraocular Movements: Extraocular movements intact.   Cardiovascular:      Rate and Rhythm: Tachycardia present.   Pulmonary:      Effort: Pulmonary effort is normal.   Neurological:      Mental Status: She is alert.

## 2023-06-29 NOTE — TRANSFER OF CARE
"Anesthesia Transfer of Care Note    Patient: Bernarda Cordero    Procedure(s) Performed: Procedure(s) (LRB):  HYPOPHYSECTOMY, TRANSSPHENOIDAL APPROACH (N/A)    Patient location: ICU    Anesthesia Type: general    Transport from OR: Transported from OR on 6-10 L/min O2 by face mask with adequate spontaneous ventilation. Continuous ECG monitoring in transport. Continuous SpO2 monitoring in transport    Post pain: adequate analgesia    Post assessment: no apparent anesthetic complications    Post vital signs: stable    Level of consciousness: sedated and responds to stimulation    Nausea/Vomiting: no nausea/vomiting    Complications: none    Transfer of care protocol was followed      Last vitals:   Visit Vitals  /77   Pulse 89   Temp 36.6 °C (97.8 °F) (Temporal)   Resp 16   Ht 5' 3" (1.6 m)   Wt 92.1 kg (203 lb)   SpO2 99%   Breastfeeding No   BMI 35.96 kg/m²     "

## 2023-06-30 ENCOUNTER — TELEPHONE (OUTPATIENT)
Dept: NEUROSURGERY | Facility: CLINIC | Age: 28
End: 2023-06-30
Payer: COMMERCIAL

## 2023-06-30 LAB
ALBUMIN SERPL BCP-MCNC: 3.4 G/DL (ref 3.5–5.2)
ALP SERPL-CCNC: 51 U/L (ref 55–135)
ALT SERPL W/O P-5'-P-CCNC: 14 U/L (ref 10–44)
ANION GAP SERPL CALC-SCNC: 9 MMOL/L (ref 8–16)
AST SERPL-CCNC: 19 U/L (ref 10–40)
BACTERIA #/AREA URNS AUTO: NORMAL /HPF
BACTERIA #/AREA URNS AUTO: NORMAL /HPF
BASOPHILS # BLD AUTO: 0.02 K/UL (ref 0–0.2)
BASOPHILS NFR BLD: 0.1 % (ref 0–1.9)
BILIRUB SERPL-MCNC: 0.4 MG/DL (ref 0.1–1)
BILIRUB UR QL STRIP: NEGATIVE
BILIRUB UR QL STRIP: NEGATIVE
BUN SERPL-MCNC: 7 MG/DL (ref 6–20)
CALCIUM SERPL-MCNC: 9 MG/DL (ref 8.7–10.5)
CHLORIDE SERPL-SCNC: 106 MMOL/L (ref 95–110)
CLARITY UR REFRACT.AUTO: CLEAR
CLARITY UR REFRACT.AUTO: CLEAR
CO2 SERPL-SCNC: 21 MMOL/L (ref 23–29)
COLOR UR AUTO: ABNORMAL
COLOR UR AUTO: YELLOW
CORTIS SERPL-MCNC: 20 UG/DL (ref 4.3–22.4)
CREAT SERPL-MCNC: 0.7 MG/DL (ref 0.5–1.4)
DIFFERENTIAL METHOD: ABNORMAL
EOSINOPHIL # BLD AUTO: 0 K/UL (ref 0–0.5)
EOSINOPHIL NFR BLD: 0.1 % (ref 0–8)
ERYTHROCYTE [DISTWIDTH] IN BLOOD BY AUTOMATED COUNT: 12.2 % (ref 11.5–14.5)
EST. GFR  (NO RACE VARIABLE): >60 ML/MIN/1.73 M^2
GLUCOSE SERPL-MCNC: 143 MG/DL (ref 70–110)
GLUCOSE UR QL STRIP: NEGATIVE
GLUCOSE UR QL STRIP: NEGATIVE
HCT VFR BLD AUTO: 42.4 % (ref 37–48.5)
HGB BLD-MCNC: 14.2 G/DL (ref 12–16)
HGB UR QL STRIP: ABNORMAL
HGB UR QL STRIP: ABNORMAL
IMM GRANULOCYTES # BLD AUTO: 0.05 K/UL (ref 0–0.04)
IMM GRANULOCYTES NFR BLD AUTO: 0.4 % (ref 0–0.5)
KETONES UR QL STRIP: NEGATIVE
KETONES UR QL STRIP: NEGATIVE
LEUKOCYTE ESTERASE UR QL STRIP: NEGATIVE
LEUKOCYTE ESTERASE UR QL STRIP: NEGATIVE
LYMPHOCYTES # BLD AUTO: 1.3 K/UL (ref 1–4.8)
LYMPHOCYTES NFR BLD: 9.5 % (ref 18–48)
MAGNESIUM SERPL-MCNC: 2 MG/DL (ref 1.6–2.6)
MCH RBC QN AUTO: 30 PG (ref 27–31)
MCHC RBC AUTO-ENTMCNC: 33.5 G/DL (ref 32–36)
MCV RBC AUTO: 90 FL (ref 82–98)
MICROSCOPIC COMMENT: NORMAL
MICROSCOPIC COMMENT: NORMAL
MONOCYTES # BLD AUTO: 0.7 K/UL (ref 0.3–1)
MONOCYTES NFR BLD: 5.1 % (ref 4–15)
NEUTROPHILS # BLD AUTO: 11.4 K/UL (ref 1.8–7.7)
NEUTROPHILS NFR BLD: 84.8 % (ref 38–73)
NITRITE UR QL STRIP: NEGATIVE
NITRITE UR QL STRIP: NEGATIVE
NRBC BLD-RTO: 0 /100 WBC
PH UR STRIP: 5 [PH] (ref 5–8)
PH UR STRIP: 6 [PH] (ref 5–8)
PHOSPHATE SERPL-MCNC: 4 MG/DL (ref 2.7–4.5)
PLATELET # BLD AUTO: 259 K/UL (ref 150–450)
PMV BLD AUTO: 11.5 FL (ref 9.2–12.9)
POTASSIUM SERPL-SCNC: 3.9 MMOL/L (ref 3.5–5.1)
PROLACTIN SERPL IA-MCNC: <0.8 NG/ML (ref 5.2–26.5)
PROT SERPL-MCNC: 6.5 G/DL (ref 6–8.4)
PROT UR QL STRIP: NEGATIVE
PROT UR QL STRIP: NEGATIVE
RBC # BLD AUTO: 4.74 M/UL (ref 4–5.4)
RBC #/AREA URNS AUTO: 0 /HPF (ref 0–4)
RBC #/AREA URNS AUTO: 2 /HPF (ref 0–4)
SODIUM SERPL-SCNC: 136 MMOL/L (ref 136–145)
SODIUM SERPL-SCNC: 137 MMOL/L (ref 136–145)
SODIUM SERPL-SCNC: 139 MMOL/L (ref 136–145)
SP GR UR STRIP: 1 (ref 1–1.03)
SP GR UR STRIP: 1.01 (ref 1–1.03)
SQUAMOUS #/AREA URNS AUTO: 2 /HPF
SQUAMOUS #/AREA URNS AUTO: 4 /HPF
URN SPEC COLLECT METH UR: ABNORMAL
URN SPEC COLLECT METH UR: ABNORMAL
WBC # BLD AUTO: 13.46 K/UL (ref 3.9–12.7)
WBC #/AREA URNS AUTO: 0 /HPF (ref 0–5)
WBC #/AREA URNS AUTO: 1 /HPF (ref 0–5)

## 2023-06-30 PROCEDURE — 99233 PR SUBSEQUENT HOSPITAL CARE,LEVL III: ICD-10-PCS | Mod: ,,,

## 2023-06-30 PROCEDURE — 99233 SBSQ HOSP IP/OBS HIGH 50: CPT | Mod: ,,,

## 2023-06-30 PROCEDURE — 11000001 HC ACUTE MED/SURG PRIVATE ROOM

## 2023-06-30 PROCEDURE — 25000003 PHARM REV CODE 250

## 2023-06-30 PROCEDURE — 83735 ASSAY OF MAGNESIUM: CPT

## 2023-06-30 PROCEDURE — 97535 SELF CARE MNGMENT TRAINING: CPT

## 2023-06-30 PROCEDURE — 80053 COMPREHEN METABOLIC PANEL: CPT

## 2023-06-30 PROCEDURE — 63600175 PHARM REV CODE 636 W HCPCS: Performed by: PHYSICIAN ASSISTANT

## 2023-06-30 PROCEDURE — 94761 N-INVAS EAR/PLS OXIMETRY MLT: CPT

## 2023-06-30 PROCEDURE — 25000003 PHARM REV CODE 250: Performed by: PHYSICIAN ASSISTANT

## 2023-06-30 PROCEDURE — 99232 SBSQ HOSP IP/OBS MODERATE 35: CPT | Mod: ,,, | Performed by: INTERNAL MEDICINE

## 2023-06-30 PROCEDURE — 63600175 PHARM REV CODE 636 W HCPCS: Performed by: NURSE PRACTITIONER

## 2023-06-30 PROCEDURE — 99232 PR SUBSEQUENT HOSPITAL CARE,LEVL II: ICD-10-PCS | Mod: ,,, | Performed by: INTERNAL MEDICINE

## 2023-06-30 PROCEDURE — 97162 PT EVAL MOD COMPLEX 30 MIN: CPT

## 2023-06-30 PROCEDURE — 84295 ASSAY OF SERUM SODIUM: CPT | Mod: 91 | Performed by: STUDENT IN AN ORGANIZED HEALTH CARE EDUCATION/TRAINING PROGRAM

## 2023-06-30 PROCEDURE — 84100 ASSAY OF PHOSPHORUS: CPT

## 2023-06-30 PROCEDURE — 85025 COMPLETE CBC W/AUTO DIFF WBC: CPT

## 2023-06-30 PROCEDURE — 97165 OT EVAL LOW COMPLEX 30 MIN: CPT

## 2023-06-30 PROCEDURE — 97116 GAIT TRAINING THERAPY: CPT

## 2023-06-30 PROCEDURE — 84146 ASSAY OF PROLACTIN: CPT | Performed by: STUDENT IN AN ORGANIZED HEALTH CARE EDUCATION/TRAINING PROGRAM

## 2023-06-30 PROCEDURE — 81001 URINALYSIS AUTO W/SCOPE: CPT | Performed by: STUDENT IN AN ORGANIZED HEALTH CARE EDUCATION/TRAINING PROGRAM

## 2023-06-30 PROCEDURE — 82533 TOTAL CORTISOL: CPT | Performed by: PHYSICIAN ASSISTANT

## 2023-06-30 RX ORDER — OXYMETAZOLINE HCL 0.05 %
2 SPRAY, NON-AEROSOL (ML) NASAL 2 TIMES DAILY PRN
Refills: 0 | COMMUNITY
Start: 2023-06-30 | End: 2023-07-03

## 2023-06-30 RX ORDER — PSEUDOEPHEDRINE HYDROCHLORIDE 60 MG/1
60 TABLET ORAL EVERY 6 HOURS PRN
Refills: 0 | COMMUNITY
Start: 2023-06-30 | End: 2023-07-10

## 2023-06-30 RX ORDER — OXYCODONE AND ACETAMINOPHEN 5; 325 MG/1; MG/1
1 TABLET ORAL EVERY 6 HOURS PRN
Qty: 28 TABLET | Refills: 0 | Status: SHIPPED | OUTPATIENT
Start: 2023-06-30

## 2023-06-30 RX ORDER — POLYETHYLENE GLYCOL 3350 17 G/17G
17 POWDER, FOR SOLUTION ORAL DAILY
Refills: 0 | COMMUNITY
Start: 2023-07-01

## 2023-06-30 RX ORDER — BUTALBITAL, ACETAMINOPHEN AND CAFFEINE 50; 325; 40 MG/1; MG/1; MG/1
1 TABLET ORAL EVERY 4 HOURS PRN
Qty: 14 TABLET | Refills: 0 | Status: SHIPPED | OUTPATIENT
Start: 2023-06-30 | End: 2023-07-30

## 2023-06-30 RX ORDER — AMOXICILLIN 250 MG
2 CAPSULE ORAL DAILY
COMMUNITY
Start: 2023-07-01

## 2023-06-30 RX ORDER — METOPROLOL TARTRATE 25 MG/1
12.5 TABLET, FILM COATED ORAL EVERY 12 HOURS
Qty: 30 TABLET | Refills: 11 | Status: SHIPPED | OUTPATIENT
Start: 2023-06-30 | End: 2023-07-21

## 2023-06-30 RX ADMIN — SODIUM CHLORIDE 250 ML: 9 INJECTION, SOLUTION INTRAVENOUS at 11:06

## 2023-06-30 RX ADMIN — NASAL 1 SPRAY: 6.5 SPRAY NASAL at 05:06

## 2023-06-30 RX ADMIN — ACETAMINOPHEN 1000 MG: 500 TABLET ORAL at 03:06

## 2023-06-30 RX ADMIN — CETIRIZINE HYDROCHLORIDE 5 MG: 5 TABLET, FILM COATED ORAL at 09:06

## 2023-06-30 RX ADMIN — PSEUDOEPHEDRINE HCL 60 MG: 30 TABLET, FILM COATED ORAL at 03:06

## 2023-06-30 RX ADMIN — OXYCODONE HYDROCHLORIDE 5 MG: 5 TABLET ORAL at 10:06

## 2023-06-30 RX ADMIN — ONDANSETRON 4 MG: 2 INJECTION INTRAMUSCULAR; INTRAVENOUS at 11:06

## 2023-06-30 RX ADMIN — OXYCODONE HYDROCHLORIDE 5 MG: 5 TABLET ORAL at 12:06

## 2023-06-30 RX ADMIN — ENOXAPARIN SODIUM 40 MG: 40 INJECTION SUBCUTANEOUS at 05:06

## 2023-06-30 RX ADMIN — SENNOSIDES AND DOCUSATE SODIUM 1 TABLET: 50; 8.6 TABLET ORAL at 08:06

## 2023-06-30 RX ADMIN — NASAL 1 SPRAY: 6.5 SPRAY NASAL at 09:06

## 2023-06-30 RX ADMIN — NASAL 1 SPRAY: 6.5 SPRAY NASAL at 12:06

## 2023-06-30 RX ADMIN — ACETAMINOPHEN 1000 MG: 500 TABLET ORAL at 09:06

## 2023-06-30 RX ADMIN — OXYCODONE HYDROCHLORIDE 10 MG: 10 TABLET ORAL at 06:06

## 2023-06-30 RX ADMIN — METOPROLOL TARTRATE 12.5 MG: 25 TABLET, FILM COATED ORAL at 08:06

## 2023-06-30 RX ADMIN — POLYETHYLENE GLYCOL 3350 17 G: 17 POWDER, FOR SOLUTION ORAL at 08:06

## 2023-06-30 NOTE — PT/OT/SLP EVAL
"Occupational Therapy   Co-Evaluation with Physical Therapy    Name: Bernarda Cordero  MRN: [id number]  Admitting Diagnosis: Pituitary adenoma  Recent Surgery: Procedure(s) (LRB):  HYPOPHYSECTOMY, TRANSSPHENOIDAL APPROACH (N/A) 1 Day Post-Op    Recommendations:     Discharge Recommendations: home  Discharge Equipment Recommendations:  none  Barriers to discharge:  None    Assessment:     Bernarda Cordero is a 28 y.o. female with a medical diagnosis of Pituitary adenoma.  She presents with dizziness, C/O headache. Pt strength and ROM WFL, able to perform functional activities independently, ambulate long distances with no AD with supervision. Pt not in need of acute OT services at this time, discharge OT. Pt and spouse educated on plan to discharge OT, agreeable to plan.      Plan:     Pt not in need of acute OT services at this time, discharge OT.    Subjective     Chief Complaint: Headache/slight dizziness  Patient/Family Comments/goals: "I think I'm okay"    Occupational Profile:  Living Environment: Lives in 1SH, 0STE, with  and three-year-old daughter. Bathroom has tub shower with no grab bars, handicap toilet. No DME at home.  Previous level of function: Independent with ADLs and functional mobility, drives, nurse at 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 Enjoys exercising, ATV riding, swimming, biking  Equipment Used at Home: none  Assistance upon Discharge: , parents    Pain/Comfort:  Pain Rating 1: 4/10  Location - Side 1: Bilateral  Location - Orientation 1: generalized  Location 1: head  Pain Addressed 1: Distraction, Nurse notified  Pain Rating Post-Intervention 1: 4/10    Patients cultural, spiritual, Jew conflicts given the current situation: no    Objective:     Communicated with: RN prior to session.  Patient found HOB elevated with blood pressure cuff, pulse ox (continuous), telemetry upon OT entry to room.    General Precautions: Standard, " fall  Orthopedic Precautions: N/A  Braces: N/A  Respiratory Status: Room air    Occupational Performance:    Bed Mobility:    Patient completed Rolling/Turning to Right with independence  Patient completed Scooting/Bridging with independence  Patient completed Supine to Sit with independence    Functional Mobility/Transfers:  Patient completed Sit <> Stand Transfer with independence  with  no assistive device   Patient completed Bed <> Chair Transfer using Step Transfer technique with independence with no assistive device  Patient completed Toilet Transfer Step Transfer technique with independence with  no AD  Functional Mobility: Pt ambulated in hallway with no AD at supervision level 2/2 dizziness.    Activities of Daily Living:  Grooming: independence hand hygiene at basin, face hygiene seated EOB  Upper Body Dressing: independence don gown as robe  Lower Body Dressing: independence don socks  Toileting: independence toileting at standard toilet    Cognitive/Visual Perceptual:  Cognitive/Psychosocial Skills:     -       Oriented to: Person, Place, Time, and Situation   -       Follows Commands/attention:Follows multistep  commands  -       Communication: clear/fluent  -       Memory: No Deficits noted  -       Safety awareness/insight to disability: intact   Visual/Perceptual:      -Intact Tracking    Physical Exam:  Balance:    -       Good  Upper Extremity Range of Motion:     -       Right Upper Extremity: WNL  -       Left Upper Extremity: WNL  Upper Extremity Strength:    -       Right Upper Extremity: WFL  -       Left Upper Extremity: WFL   Strength:    -       Right Upper Extremity: WFL  -       Left Upper Extremity: WFL  Fine Motor Coordination:    -       Intact    AMPAC 6 Click ADL:  AMPAC Total Score: 24    Treatment & Education:  Pt and  educated on role of OT in acute care, general safety precautions, OT plan to discharge. Pt and  agreeable to discharge from OT.  Co-tx with  physical therapy 2/2 need for two sets of skilled hands to maximize patient safety during evaluation in ICU setting.  Patient left up in chair with all lines intact, call button in reach, RN notified, and spouse present    GOALS:   Multidisciplinary Problems       Occupational Therapy Goals       Not on file                    History:     Past Medical History:   Diagnosis Date    ADD (attention deficit disorder)     ADHD (attention deficit hyperactivity disorder)     Anxiety     Hx of psychiatric care     Obesity     Psychiatric exam requested by authority     Psychiatric problem     Sleep difficulties     Therapy          Past Surgical History:   Procedure Laterality Date    APPENDECTOMY      2012    CYST REMOVAL      left ring finger- Mid school    HYPOPHYSECTOMY, TRANSSPHENOIDAL APPROACH N/A 6/29/2023    Procedure: HYPOPHYSECTOMY, TRANSSPHENOIDAL APPROACH;  Surgeon: Hilario Nevarez MD;  Location: [facility] OR [address] [place] [address];  Service: Neurosurgery;  Laterality: N/A;       Time Tracking:     OT Date of Treatment: 06/30/23  OT Start Time: 1038  OT Stop Time: 1101  OT Total Time (min): 23 min    Billable Minutes:Evaluation 12  Self Care/Home Management 13    6/30/2023

## 2023-06-30 NOTE — PLAN OF CARE
[facility] Care Plan    POC reviewed with Bernarda Cordero and family at 0100. Pt and mother verbalized understanding. Questions and concerns addressed. No acute events overnight. Pt progressing toward goals. Will continue to monitor. See below and flowsheets for full assessment and VS info.     - PRN oxy 10 given x 2  - PRN oxy 5 given x 1  - Bath given, linens changed          Is this a stroke patient? no    Neuro:  [place] Coma Scale  Best Eye Response: 4-->(E4) spontaneous  Best Motor Response: 6-->(M6) obeys commands  Best Verbal Response: 5-->(V5) oriented  [place] Coma Scale Score: 15  Assessment Qualifiers: patient not sedated/intubated  Pupil PERRLA: yes     24hr Temp:  [97 °F (36.1 °C)-98.5 °F (36.9 °C)]     CV:   Rhythm: normal sinus rhythm  BP goals:   SBP < 140  MAP > 65    Resp:           Plan: N/A    GI/:     Diet/Nutrition Received: regular  Last Bowel Movement: 06/29/23  Voiding Characteristics: external catheter    Intake/Output Summary (Last 24 hours) at 6/30/2023 0128  Last data filed at 6/29/2023 2205  Gross per 24 hour   Intake 1200 ml   Output 650 ml   Net 550 ml     Unmeasured Output  Urine Occurrence: 1  Stool Occurrence: 0  Pad Count: 1    Labs/Accuchecks:  No results for input(s): WBC, RBC, HGB, HCT, PLT in the last 168 hours.   Recent Labs   Lab 06/30/23  0039      K 3.9   CO2 21*      BUN 7   CREATININE 0.7   ALKPHOS 51*   ALT 14   AST 19   BILITOT 0.4    No results for input(s): PROTIME, INR, APTT, HEPANTIXA in the last 168 hours. No results for input(s): CPK, CPKMB, TROPONINI, MB in the last 168 hours.    Electrolytes: N/A - electrolytes WDL  Accuchecks: none    Gtts:      LDA/Wounds:  Lines/Drains/Airways       Drain  Duration             Female External Urinary Catheter 06/29/23 2100 <1 day              Peripheral Intravenous Line  Duration                  Peripheral IV - Single Lumen 06/29/23 0600 20 G Anterior;Proximal;Right Forearm <1 day         Peripheral IV - Single  Lumen 06/29/23 0727 18 G Left Forearm <1 day                  Wounds: Yes  Wound care consulted: No

## 2023-06-30 NOTE — PT/OT/SLP EVAL
"Physical Therapy Evaluation and Discharge Note    Patient Name:  Bernarda Cordero   MRN:  [id number]  Co-evaluation w/ OT 2/2 suspected pt complexity and requirement of assistance from 2 skilled therapists to maximize treatment potential and maintain pt safety   Recommendations:     Discharge Recommendations: other (see comments)  Discharge Equipment Recommendations: none   Barriers to discharge: None    Assessment:     Bernarda Cordero is a 28 y.o. female admitted with a medical diagnosis of Pituitary adenoma. .  At this time, patient is functioning at their prior level of function and does not require further acute PT services.     Recent Surgery: Procedure(s) (LRB):  HYPOPHYSECTOMY, TRANSSPHENOIDAL APPROACH (N/A) 1 Day Post-Op    Plan:     During this hospitalization, patient does not require further acute PT services.  Please re-consult if situation changes.      Subjective     Chief Complaint: "woozy" feeling  Patient/Family Comments/goals: get back home independently  Pain/Comfort:  Pain Rating 1: 4/10  Location 1: head  Pain Addressed 1: Distraction, Nurse notified  Pain Rating Post-Intervention 1: 4/10    Patients cultural, spiritual, Synagogue conflicts given the current situation: no    Living Environment:  Pt lives w/  and 4yo dtr in a [facility] w/ no RADHA.  Prior to admission, patients level of function was independent; works as an RN at [employer].  Equipment used at home: none.  DME owned (not currently used): none.  Upon discharge, patient will have assistance from family.    Objective:     Communicated with RN prior to session.  Patient found HOB elevated with blood pressure cuff, pulse ox (continuous), telemetry upon PT entry to room.    General Precautions: Standard, fall    Orthopedic Precautions:N/A   Braces: N/A  Respiratory Status: Room air    Exams:  Cognitive Exam:  Patient is oriented to Person, Place, Time, and Situation  Gross Motor Coordination:  WFL  Sensation:    -       " Intact  RLE ROM: WFL  RLE Strength: WFL  LLE ROM: WFL  LLE Strength: WFL    Functional Mobility:  Bed Mobility:     Supine to Sit: independence  Transfers:     Sit to Stand:  independence with no AD  Bed to Chair: independence with  no AD  using  Step Transfer  Toilet Transfer: independence with  no AD  using  Step Transfer  Gait: 300' w/ supervision    AM-PAC 6 CLICK MOBILITY  Total Score:22       Treatment and Education:  Pt and family educated on PT assessment and plan to d/c orders 2/2 no current needs for skilled PT. Pt instructed to slowly progress mobility each day towards PLOF without attempting to jump right into previous daily routine immediately upon d/c 2/2 general deconditioning from being in hospital. Pt and family in agreement w/ POC and verbalized understanding w/ plan to slowly progress to normal daily activities at home and inform MD team if pt experiences any difficulty progressing back to PLOF while here or upon d/c in order for PT to be re-consulted/orders placed for OP PT as needed.     AM-PAC 6 CLICK MOBILITY  Total Score:22     Patient left up in chair with all lines intact, call button in reach, RN notified, and spouse present.    GOALS:   Multidisciplinary Problems       Physical Therapy Goals       Not on file                    History:     Past Medical History:   Diagnosis Date    ADD (attention deficit disorder)     ADHD (attention deficit hyperactivity disorder)     Anxiety     Hx of psychiatric care     Obesity     Psychiatric exam requested by authority     Psychiatric problem     Sleep difficulties     Therapy        Past Surgical History:   Procedure Laterality Date    APPENDECTOMY      2012    CYST REMOVAL      left ring finger- [employer] school    HYPOPHYSECTOMY, TRANSSPHENOIDAL APPROACH N/A 6/29/2023    Procedure: HYPOPHYSECTOMY, TRANSSPHENOIDAL APPROACH;  Surgeon: Hilario Nevarez MD;  Location: [facility] OR [address] [place] [address];  Service: Neurosurgery;  Laterality: N/A;       Time Tracking:     PT  Received On: 06/30/23  PT Start Time: 1037     PT Stop Time: 1101  PT Total Time (min): 24 min     Billable Minutes: Evaluation 10 and Gait Training 14      06/30/2023

## 2023-06-30 NOTE — HOSPITAL COURSE
6/30: OR yesterday, tolerated procedure well, no leakage from nose. Headache improved. Na 136. Spec grave 1.03.

## 2023-06-30 NOTE — ASSESSMENT & PLAN NOTE
Key History and Diagnostic Findings  - Pituitary macroadenoma status-post transsphenoidal surgery on 06/29/2023, history of prolactin elevation  - Endocrine consulted for post-surgical monitoring for Central Diabetes Insipidus which can manifest within the first five post-operative days    Plan  - Neuro check per neurology protocol, continue sinus precautions and monitoring per neurosurgery    - Avoid use of preoperative steroids (dexamethasone, hydrocortisone, methylprednisolone, etc.)  unless neurosurgery is treating for inflammation  - Pending 8:00 AM cortisol result to screen for adrenal insufficiency    - Monitor for diabetes insipidus with Q6Hr Urinalysis (urine specific gravity), strict monitoring of intake/output hourly, and if urine output greater than 250 cc for over 2 hours, draw BMP, serum Osm, urine Osm, urine sodium, and urinalysis stat and notify primary team and endocrine on-call  - Give desmopressin (DDAVP 10 mcg intranasal x1 dose) only if:  - Urine output greater than 250cc for more two or more consecutive hours  - and NA greater than 137 with uptrend  - and low urinary specific gravity less than 1.010  - and inability of the patient to match fluid intake reasonably  - Please page endocrine if questions    - If patient alert and has intact thirst response, encourage to drink to thirst. If significant bothersome nocturia, patients may receive intranasal DDAVP (in non-operated nostril). Contact primary team prior to giving DDAVP  - If patient altered or if patient unable to drink sufficiently to maintain euvolemia, then start DDAVP PRN. Avoid standing doses of DDAVP in patients without pre-existing DI as it may be self-limited    - With history of prolactin elevation, pending repeat prolactin level this AM    Discharge Recommendations  - Discharge per transsphenoidal resection protocol when ready from a surgical standpoint  - Obtain 8:00 AM Cortisol on day of discharge if patient off other  steroids for over 24 hours. If 8:00 AM Cortisol is less <8, give Hydrocortisone (20mg am / 10 mg pm) on discharge  - Other pituitary hormones can be monitored outpatient in pituitary clinic  - Will require outpatient CMP 7-10 days post-operatively to monitor for SIADH or persistent DI  - Follow up in Pituitary Clinic with Dr. Abbott in 2 weeks, we will set up appointment

## 2023-06-30 NOTE — ANESTHESIA POSTPROCEDURE EVALUATION
Anesthesia Post Evaluation    Patient: Bernarda Cordero    Procedure(s) Performed: Procedure(s) (LRB):  HYPOPHYSECTOMY, TRANSSPHENOIDAL APPROACH (N/A)    Final Anesthesia Type: general      Patient location during evaluation: ICU  Patient participation: Yes- Able to Participate  Level of consciousness: awake and alert and oriented  Post-procedure vital signs: reviewed and stable  Pain management: adequate  Airway patency: patent    PONV status at discharge: No PONV  Anesthetic complications: no      Cardiovascular status: hemodynamically stable  Respiratory status: unassisted, spontaneous ventilation and room air  Hydration status: euvolemic  Follow-up not needed.          Vitals Value Taken Time   BP 99/64 06/30/23 1331   Temp 37 °C (98.6 °F) 06/30/23 1101   Pulse 54 06/30/23 1343   Resp 9 06/30/23 1343   SpO2 96 % 06/30/23 1343   Vitals shown include unvalidated device data.      No case tracking events are documented in the log.      Pain/Shayna Score: Pain Rating Prior to Med Admin: 4 (6/30/2023 10:28 AM)

## 2023-06-30 NOTE — ASSESSMENT & PLAN NOTE
28 F presents for elective prolactinoma resection on 6/29    -- ICU post op, OK for TTF today  -- q4 hour neurochecks  -- SBP < 160  -- post op CT with expected post op changes  -- DI watch: strict I/O, q6 Na/UA  -- No steroids, AM cortisol 20  -- OK for diet  -- OK for OOB  -- OK for SQH 24 hours after surgery     -- Possible DC home later today

## 2023-06-30 NOTE — SUBJECTIVE & OBJECTIVE
Interval History: 6/30: OR yesterday, tolerated procedure well, no leakage from nose. Headache improved. Na 136. Spec grave 1.03.     Medications:  Continuous Infusions:  Scheduled Meds:   acetaminophen  1,000 mg Oral Q8H    cetirizine  5 mg Oral QHS    enoxparin  40 mg Subcutaneous Q24H (prophylaxis, 1700)    metoprolol tartrate  12.5 mg Oral Q12H    polyethylene glycol  17 g Oral Daily    senna-docusate 8.6-50 mg  1 tablet Oral Daily    sodium chloride  1 spray Each Nostril QID     PRN Meds:diazePAM, hydrALAZINE, labetalol, mupirocin, ondansetron, oxyCODONE, oxyCODONE, oxymetazoline, promethazine IVPB, pseudoephedrine     Review of Systems  Objective:     Weight: 92.1 kg (203 lb)  Body mass index is 35.96 kg/m².  Vital Signs (Most Recent):  Temp: 98.5 °F (36.9 °C) (06/30/23 0701)  Pulse: 84 (06/30/23 0803)  Resp: 13 (06/30/23 0803)  BP: 102/62 (06/30/23 0803)  SpO2: 97 % (06/30/23 0803) Vital Signs (24h Range):  Temp:  [97 °F (36.1 °C)-98.5 °F (36.9 °C)] 98.5 °F (36.9 °C)  Pulse:  [56-97] 84  Resp:  [10-25] 13  SpO2:  [94 %-100 %] 97 %  BP: ()/(52-74) 102/62     Date 06/30/23 0700 - 07/01/23 0659   Shift 0821-6974 8751-4645 0738-3203 24 Hour Total   INTAKE   Shift Total(mL/kg)       OUTPUT   Urine(mL/kg/hr) 0   0   Shift Total(mL/kg) 0(0)   0(0)   Weight (kg) 92.1 92.1 92.1 92.1                       Female External Urinary Catheter 06/29/23 2100 (Active)   Output (mL) 0 mL 06/30/23 0701          Physical Exam         Neurosurgery Physical Exam    Physical Exam:    Constitutional: No distress.     HEENT: atraumatic/normocephalic    Cardiovascular: Regular rhythm.     Pulm: aerating well, saturating well    Abdominal: Soft.     Psych/Behavior: He is alert.     E4V5M6  AOx3  PERRL  EOMI  Face Symmetric  Tongue midline  BUE 5/5  BLE 5/5  No drift      Significant Labs:  Recent Labs   Lab 06/29/23  1551 06/29/23  1840 06/30/23  0039 06/30/23  0616   GLU  --   --  143*  --    NA  --  136 136 139   K  --   --  3.9   --    CL  --   --  106  --    CO2  --   --  21*  --    BUN  --   --  7  --    CREATININE 0.7  --  0.7  --    CALCIUM  --   --  9.0  --    MG  --   --  2.0  --      Recent Labs   Lab 06/30/23  0616   WBC 13.46*   HGB 14.2   HCT 42.4        No results for input(s): LABPT, INR, APTT in the last 48 hours.  Microbiology Results (last 7 days)       ** No results found for the last 168 hours. **          All pertinent labs from the last 24 hours have been reviewed.    Significant Diagnostics:  I have reviewed all pertinent imaging results/findings within the past 24 hours.

## 2023-06-30 NOTE — PROGRESS NOTES
"[facility] - Neuro Critical Care  Endocrinology  Progress Note    Admit Date: 6/29/2023     Bernarda Cordero is a 28 y.o. woman who presented for planned resection of a cystic sellar mass.  She became established in the [facility] endocrine clinic in April with Dr. Abbott where she also was seen by Dr. Nevarez with neurosurgery.  She was followed previously by Dr. Huerta, endocrinology, following a history of worsening galactorrhea despite cessation breast feeding and was found to have 1.4 cm cystic pituitary lesion during her workup and mildly elevated prolactin level. She denied vision compromises during that time.  She has been on Cabergoline therapy prior to her TSR.  Laboratory workup was not concerning except for history of prolactin elevation (on outside labs - see below).  Surgery today (06/29/2023) with Dr. Nevarez via TSR approach.      Endocrine is now consulted for post-op TSR monitoring of hormone levels including DI monitoring/treatment    Prior Imaging:     MRI Pituitary: 11/01/2021  Cystic and solid mass in the pituitary gland with its epicenter in the right side of the gland 1.4 x 1.2 x 1.2 cm and displaces the rest of the gland parenchyma to the left as well as the infundibulum, which enhances normally, is normal in diameter, and extends toward the hypothalamic region, which is unremarkable. This also exerts some mass effect on the most distal chiasm and proximal post chiasmatic optic nerves."  Impression: "1.4 x 1.2 x 1.2 cm primarily cystic mass arising right sided pituitary gland is most likely a cystic or necrotic pituitary microadenoma given the history     Most Recent Imaging:  MRI Pituitary: 03/20/2023  No significant change in the 1.4 x 1.2 x 1.2 cm primarily cystic mass arising right sided pituitary gland is most likely a cystic or necrotic pituitary microadenoma given the history.      Lab Results   Component Value Date    TSH 1.65 10/03/2008     06/29/2023     06/29/2023     " "06/29/2023     06/20/2023    K 4.2 06/20/2023    K 4.0 02/01/2017    CALCIUM 9.2 06/20/2023    CALCIUM 9.0 02/01/2017    ALBUMIN 4.1 06/20/2023    ALBUMIN 3.6 02/01/2017    ESTGFRAFRICA >60.0 02/01/2017    EGFRNONAA >60.0 02/01/2017     Prior to surgery she was on Cabergoline 0.25 mg twice a week.        PROLACTIN LEVELS: Oct 2021 prl: 54.9; Nov 2021 prl: 48.5  June 2022 prl: 37.5 and still had galactorrhea and wasn't losing weight. STARTED CABERGOLINE.  Aug 2022 prl: 2.0  Dec 2022 prl: 9.0 and stopped her cabergoline after a Nov 2022 ER visit for palpitations  Jan 2023  prl: 31.7 Cabergoline restarted  March 2023  prl: 2.1         Interval HPI:   Overnight events: s/p surgery on 06/29.  Net negative intake/output of -100.  UA showing drop in spec grav.  UOP intermittent records every few hours.  Pending eval for decision on if DDAVP needed.   Lab this am for cortisol and prolactin.      /62   Pulse 84   Temp 98.5 °F (36.9 °C) (Oral)   Resp 13   Ht 5' 3" (1.6 m)   Wt 92.1 kg (203 lb)   SpO2 97%   Breastfeeding No   BMI 35.96 kg/m²     Labs Reviewed and Include    Recent Labs   Lab 06/30/23  0039 06/30/23  0616   *  --    CALCIUM 9.0  --    ALBUMIN 3.4*  --    PROT 6.5  --     139   K 3.9  --    CO2 21*  --      --    BUN 7  --    CREATININE 0.7  --    ALKPHOS 51*  --    ALT 14  --    AST 19  --    BILITOT 0.4  --      Lab Results   Component Value Date    WBC 13.46 (H) 06/30/2023    HGB 14.2 06/30/2023    HCT 42.4 06/30/2023    MCV 90 06/30/2023     06/30/2023     No results for input(s): TSH, FREET4 in the last 168 hours.  Lab Results   Component Value Date    HGBA1C 5.3 06/12/2023       Nutritional status:   Body mass index is 35.96 kg/m².  Lab Results   Component Value Date    ALBUMIN 3.4 (L) 06/30/2023    ALBUMIN 4.1 06/20/2023    ALBUMIN 3.6 02/01/2017     No results found for: PREALBUMIN    Estimated Creatinine Clearance: 127.9 mL/min (based on SCr of 0.7 " mg/dL).    Accu-Checks  No results for input(s): POCTGLUCOSE in the last 72 hours.    Current Medications and/or Treatments Impacting Glycemic Control  Immunotherapy:    Immunosuppressants       None          Steroids:   Hormones (From admission, onward)      None          Pressors:    Autonomic Drugs (From admission, onward)      Start     Stop Route Frequency Ordered    06/29/23 2100  metoprolol tartrate (LOPRESSOR) split tablet 12.5 mg         -- Oral Every 12 hours 06/29/23 1236          Hyperglycemia/Diabetes Medications:   Antihyperglycemics (From admission, onward)      None            ASSESSMENT and PLAN    Endocrine  * Pituitary adenoma  Key History and Diagnostic Findings  - Pituitary macroadenoma status-post transsphenoidal surgery on 06/29/2023, history of prolactin elevation  - Endocrine consulted for post-surgical monitoring for Central Diabetes Insipidus which can manifest within the first five post-operative days    Plan  - Neuro check per neurology protocol, continue sinus precautions and monitoring per neurosurgery    - Avoid use of preoperative steroids (dexamethasone, hydrocortisone, methylprednisolone, etc.)  unless neurosurgery is treating for inflammation  - Pending 8:00 AM cortisol result to screen for adrenal insufficiency    - Monitor for diabetes insipidus with Q6Hr Urinalysis (urine specific gravity), strict monitoring of intake/output hourly, and if urine output greater than 250 cc for over 2 hours, draw BMP, serum Osm, urine Osm, urine sodium, and urinalysis stat and notify primary team and endocrine on-call  - Give desmopressin (DDAVP 10 mcg intranasal x1 dose) only if:  - Urine output greater than 250cc for more two or more consecutive hours  - and NA greater than 137 with uptrend  - and low urinary specific gravity less than 1.010  - and inability of the patient to match fluid intake reasonably  - Please page endocrine if questions    - If patient alert and has intact thirst  response, encourage to drink to thirst. If significant bothersome nocturia, patients may receive intranasal DDAVP (in non-operated nostril). Contact primary team prior to giving DDAVP  - If patient altered or if patient unable to drink sufficiently to maintain euvolemia, then start DDAVP PRN. Avoid standing doses of DDAVP in patients without pre-existing DI as it may be self-limited    - With history of prolactin elevation, pending repeat prolactin level this AM    Discharge Recommendations  - Discharge per transsphenoidal resection protocol when ready from a surgical standpoint  - Obtain 8:00 AM Cortisol on day of discharge if patient off other steroids for over 24 hours. If 8:00 AM Cortisol is less <8, give Hydrocortisone (20mg am / 10 mg pm) on discharge  - Other pituitary hormones can be monitored outpatient in pituitary clinic  - Will require outpatient CMP 7-10 days post-operatively to monitor for SIADH or persistent DI  - Follow up in Pituitary Clinic with Dr. Abbott in 2 weeks, we will set up appointment        Geovany Vuong, DO  Endocrinology

## 2023-06-30 NOTE — PROGRESS NOTES
[facility] - Neuro Critical Care  Neurocritical Care  Progress Note    Admit Date: 6/29/2023  Service Date: 06/30/2023  Length of Stay: 1    Subjective:     Chief Complaint: Pituitary adenoma    History of Present Illness: Ms. Bernarda Cordero is a 28 y.o. woman with a PMHx of galactorrhea following pregnancy who presents to [facility] s/p transphenoidal resection of pituitary adenoma.  She was initially referred to NSGY by Dr. Huerta, endocrinology, following the galactorrhea and was  found to have 1.4 cm cystic pituitary lesion during her workup and mildly elevated prolactin level. She denied vision compromises during that time.     She will be admitted to [facility] for hourly neuro monitoring and a higher level of care.       Hospital Course: 06/30/2023 No signs of DI. Boarding for NSGY.       Interval History:  See above. Headache improved.     Review of Systems   Constitutional:  Negative for chills, fatigue and fever.   HENT:  Negative for congestion, nosebleeds and postnasal drip.    Eyes:  Negative for photophobia, redness and visual disturbance.   Respiratory:  Negative for cough, shortness of breath and wheezing.    Cardiovascular:  Negative for chest pain, palpitations and leg swelling.   Gastrointestinal:  Positive for nausea. Negative for constipation and vomiting.   Genitourinary:  Negative for difficulty urinating, hematuria and urgency.   Neurological:  Positive for headaches. Negative for dizziness, seizures, facial asymmetry, weakness, light-headedness and numbness.     Objective:     Vitals:  Temp: 98.5 °F (36.9 °C)  Pulse: 68  Rhythm: normal sinus rhythm  BP: (!) 98/57  MAP (mmHg): 73  Resp: 20  SpO2: 99 %    Temp  Min: 97.7 °F (36.5 °C)  Max: 98.5 °F (36.9 °C)  Pulse  Min: 56  Max: 97  BP  Min: 95/58  Max: 124/74  MAP (mmHg)  Min: 68  Max: 94  Resp  Min: 10  Max: 25  SpO2  Min: 94 %  Max: 99 %    06/29 0701 - 06/30 0700  In: 2200 [P.O.:1000; I.V.:1000]  Out: 2300 [Urine:2300]   Unmeasured Output  Urine  Occurrence: 1  Stool Occurrence: 0  Pad Count: 1        Physical Exam  Constitutionally: Patient resting comfortably. No acute distress.   HEENT: normocephalic, atraumatic. Extraocular movement intact. Anicteric, no conjunctival injection.  Neck: No tracheal deviation. No gross lymphadenopathy.  CV: regular rate, extremities well perfused.  Pulm: No respiratory distress on room air. Equal chest rise bilaterally.  Abdomen: No appreciable distention or ascites.  MSK: No obvious deformities  Skin: No appreciable rashes or jaundice.         Neurologic:  E4V5M6  Follows all commands  AAOx3, though responses delayed  PERRL, EOMI  Visual fields appear intact  Pupils brisk  SILT  RAMÍREZ and follows all commands       Medications:  Continuous Scheduledacetaminophen, 1,000 mg, Q8H  cetirizine, 5 mg, QHS  enoxparin, 40 mg, Q24H (prophylaxis, 1700)  metoprolol tartrate, 12.5 mg, Q12H  polyethylene glycol, 17 g, Daily  senna-docusate 8.6-50 mg, 1 tablet, Daily  sodium chloride, 1 spray, QID    PRNdiazePAM, 5 mg, Q12H PRN  hydrALAZINE, 10 mg, Q6H PRN  labetalol, 10 mg, Q6H PRN  mupirocin, , On Call Procedure  ondansetron, 4 mg, Q6H PRN  oxyCODONE, 5 mg, Q4H PRN  oxyCODONE, 10 mg, Q4H PRN  oxymetazoline, 2 spray, BID PRN  promethazine IVPB, 6.25 mg, Q6H PRN  pseudoephedrine, 60 mg, Q6H PRN      Today I personally reviewed pertinent medications, lines/drains/airways, imaging, cardiology results, laboratory results, microbiology results, notably:    Diet  Diet Adult Regular (IDDSI Level 7)  Diet Adult Regular (IDDSI Level 7)          Assessment/Plan:     Psychiatric  Anxiety disorder  PRN valium resumed, though patient states she no longer takes    Cardiac/Vascular  History of sinus tachycardia  Resume home lopressor    Endocrine  * Pituitary adenoma  29 y/o F presented with new onset galactorrhea following pregnancy and found to have 1.4 cm pituitary lesion s/p TSR 6/29.  -Admit to NCC  -NSGY, endo following  -q1h neuro checks, vital  checks  -Daily CBC, CMP, mag, phos  -q6h Na, UA, strict hourly I/O  -SBP < 140, prn labetalol and hydralazine  -SCDs, Lovenox  -CT post op complete  -PT/OT/SLP as appropriate            The patient is being Prophylaxed for:  Venous Thromboembolism with: Mechanical or Chemical  Stress Ulcer with: None  Ventilator Pneumonia with: not applicable    Activity Orders          Diet Adult Regular (IDDSI Level 7): Regular starting at 06/29 1113        No Order     Level III    DORITA OcampoC  Neurocritical Care  [facility] - Neuro Critical Care

## 2023-06-30 NOTE — PLAN OF CARE
06/30/23 1540   Post-Acute Status   Post-Acute Authorization Other   Other Status No Post-Acute Service Needs     Zuleika Guardado LCSW  Neurocritical Care   [facility]  [id number]

## 2023-06-30 NOTE — ASSESSMENT & PLAN NOTE
29 y/o F presented with new onset galactorrhea following pregnancy and found to have 1.4 cm pituitary lesion s/p TSR 6/29.  -Admit to NCC  -NSGY, endo following  -q1h neuro checks, vital checks  -Daily CBC, CMP, mag, phos  -q6h Na, UA, strict hourly I/O  -SBP < 140, prn labetalol and hydralazine  -SCDs, Lovenox  -CT post op complete  -PT/OT/SLP as appropriate

## 2023-06-30 NOTE — SUBJECTIVE & OBJECTIVE
Interval History:  See above. Headache improved.     Review of Systems   Constitutional:  Negative for chills, fatigue and fever.   HENT:  Negative for congestion, nosebleeds and postnasal drip.    Eyes:  Negative for photophobia, redness and visual disturbance.   Respiratory:  Negative for cough, shortness of breath and wheezing.    Cardiovascular:  Negative for chest pain, palpitations and leg swelling.   Gastrointestinal:  Positive for nausea. Negative for constipation and vomiting.   Genitourinary:  Negative for difficulty urinating, hematuria and urgency.   Neurological:  Positive for headaches. Negative for dizziness, seizures, facial asymmetry, weakness, light-headedness and numbness.     Objective:     Vitals:  Temp: 98.5 °F (36.9 °C)  Pulse: 68  Rhythm: normal sinus rhythm  BP: (!) 98/57  MAP (mmHg): 73  Resp: 20  SpO2: 99 %    Temp  Min: 97.7 °F (36.5 °C)  Max: 98.5 °F (36.9 °C)  Pulse  Min: 56  Max: 97  BP  Min: 95/58  Max: 124/74  MAP (mmHg)  Min: 68  Max: 94  Resp  Min: 10  Max: 25  SpO2  Min: 94 %  Max: 99 %    06/29 0701 - 06/30 0700  In: 2200 [P.O.:1000; I.V.:1000]  Out: 2300 [Urine:2300]   Unmeasured Output  Urine Occurrence: 1  Stool Occurrence: 0  Pad Count: 1        Physical Exam  Constitutionally: Patient resting comfortably. No acute distress.   HEENT: normocephalic, atraumatic. Extraocular movement intact. Anicteric, no conjunctival injection.  Neck: No tracheal deviation. No gross lymphadenopathy.  CV: regular rate, extremities well perfused.  Pulm: No respiratory distress on room air. Equal chest rise bilaterally.  Abdomen: No appreciable distention or ascites.  MSK: No obvious deformities  Skin: No appreciable rashes or jaundice.         Neurologic:  E4V5M6  Follows all commands  AAOx3, though responses delayed  PERRL, EOMI  Visual fields appear intact  Pupils brisk  SILT  RAMÍREZ and follows all commands       Medications:  Continuous Scheduledacetaminophen, 1,000 mg, Q8H  cetirizine, 5 mg,  QHS  enoxparin, 40 mg, Q24H (prophylaxis, 1700)  metoprolol tartrate, 12.5 mg, Q12H  polyethylene glycol, 17 g, Daily  senna-docusate 8.6-50 mg, 1 tablet, Daily  sodium chloride, 1 spray, QID    PRNdiazePAM, 5 mg, Q12H PRN  hydrALAZINE, 10 mg, Q6H PRN  labetalol, 10 mg, Q6H PRN  mupirocin, , On Call Procedure  ondansetron, 4 mg, Q6H PRN  oxyCODONE, 5 mg, Q4H PRN  oxyCODONE, 10 mg, Q4H PRN  oxymetazoline, 2 spray, BID PRN  promethazine IVPB, 6.25 mg, Q6H PRN  pseudoephedrine, 60 mg, Q6H PRN      Today I personally reviewed pertinent medications, lines/drains/airways, imaging, cardiology results, laboratory results, microbiology results, notably:    Diet  Diet Adult Regular (IDDSI Level 7)  Diet Adult Regular (IDDSI Level 7)

## 2023-06-30 NOTE — DISCHARGE INSTRUCTIONS
Pituitary Adenoma Resection - Patient Information    -Do not take any Aspirin, Aspirin-containing products, or nonsteroidal anti-inflammatory drugs (NSAID's) like Advil, Ibuprofen, Aleve, Mobic, etc for 2 weeks after surgery.   -Continue over the counter Ocean Nasal Spray, 1 spray per nostril, four times per day for 10 days after surgery.  -Continue over the counter Afrin nasal spray, 1 spray per nostril, twice daily for 7 days after surgery.   -Please notify Dr. Nevarez or Dr. Abbott's office if you are having frequent urination or excessive thirst. This may indicate that you have a temporary hormone deficiency and will need to do some blood and urine studies to confirm.  -No excessive or forceful blowing of your nose  -Do not drink through straws, stick anything up your nose or sneeze with a closed mouth.   -No lifting or strenuous activity.  -Do not strain to have a bowel movement. Please take over the counter Miralax and a stool softener as needed for constipation. Following dosing instructions on the packaging.   -No driving while taking narcotic pain medications  -You may take over the counter Pseudoephedrine 30-60 mg every 4 hours as needed for nasal congestion           -If you have any issues with elevated blood pressure, stop taking immediately           -If you have any issues with urinary retention, stop taking immediately           -If you have any issues with unwanted side effects such as insomnia or feeling jittery, stop taking immediately  -Do not perform any excessive bending over or leaning forward as this is a fall hazard.  -Do not perform any heavy lifting or lifting more than 10 lbs from the ground level as this is a fall hazard.  -Do not take any over the counter products containing acetaminophen at the same time as you take your narcotic pain medication. Medications that may contain acetaminophen include but are not limited to: Excedrin and other headache medications, arthritis medications, cold  and sinus medications, etc. Please review the list of active ingredients on any OTC medication prior to taking it.  -Do not consume any alcoholic beverages until released by your Neurosurgeon  -Follow up with Dr. Nevarez and Dr. Abbott in 2 weeks for a wound check and pathology results. Follow up labs in 7 days. If you are getting labs done outside of an [facility] facility, please obtain results of blood work and send to Dr. Abbott through the patient portal.   Future Appointments   Date Time Provider Department Center   7/6/2023 11:50 AM LAB, [facility] RV[facility] LAB [facility]   7/21/2023  8:30 AM Alex Casillas MD [facility]   7/21/2023  2:15 PM Hilario Nevarez MD [facility]         Contact the Neurosurgery Office immediately if:  -If you begin to notice any excessive thirst or urination  -If you begin to notice any clear drainage with a salty taste   -If you begin to notice any neurologic changes such as:           -Sudden onset of lethargy or sleepiness           -Sudden confusion, trouble speaking, or understanding            -Sudden trouble seeing in one or both eyes            -Sudden trouble walking, dizziness, loss of coordination            -Sudden severe headache with no known cause            -Sudden onset of numbness or weakness            -Sudden onset of clear, salty drainage from your nose or a salty taste in your mouth           -Sudden onset of severe thirst that cannot be quenched           -Increased urine output. If you urinate more than 3 times in one hour, please contact Neurosurgery office.     Symptoms of low cortisol or adrenal insufficiency can be the following:     - unexplained weight loss  - low blood pressure  - lightheadedness  - nausea and/or vomiting  - abdominal pain  - new severe fatigue     Please notify Dr. Abbott if you develop these symptoms.    Wound Care:  For your nasal incision: Continue Ocean Nasal Spray four times per day for 10 days.       Neurosurgery  Office: [phone]

## 2023-06-30 NOTE — SUBJECTIVE & OBJECTIVE
"Interval HPI:   Overnight events: s/p surgery on 06/29.  Net negative intake/output of -100.  UA showing drop in spec grav.  UOP intermittent records every few hours.  Pending eval for decision on if DDAVP needed.   Lab this am for cortisol and prolactin.      /62   Pulse 84   Temp 98.5 °F (36.9 °C) (Oral)   Resp 13   Ht 5' 3" (1.6 m)   Wt 92.1 kg (203 lb)   SpO2 97%   Breastfeeding No   BMI 35.96 kg/m²     Labs Reviewed and Include    Recent Labs   Lab 06/30/23  0039 06/30/23  0616   *  --    CALCIUM 9.0  --    ALBUMIN 3.4*  --    PROT 6.5  --     139   K 3.9  --    CO2 21*  --      --    BUN 7  --    CREATININE 0.7  --    ALKPHOS 51*  --    ALT 14  --    AST 19  --    BILITOT 0.4  --      Lab Results   Component Value Date    WBC 13.46 (H) 06/30/2023    HGB 14.2 06/30/2023    HCT 42.4 06/30/2023    MCV 90 06/30/2023     06/30/2023     No results for input(s): TSH, FREET4 in the last 168 hours.  Lab Results   Component Value Date    HGBA1C 5.3 06/12/2023       Nutritional status:   Body mass index is 35.96 kg/m².  Lab Results   Component Value Date    ALBUMIN 3.4 (L) 06/30/2023    ALBUMIN 4.1 06/20/2023    ALBUMIN 3.6 02/01/2017     No results found for: PREALBUMIN    Estimated Creatinine Clearance: 127.9 mL/min (based on SCr of 0.7 mg/dL).    Accu-Checks  No results for input(s): POCTGLUCOSE in the last 72 hours.    Current Medications and/or Treatments Impacting Glycemic Control  Immunotherapy:    Immunosuppressants       None          Steroids:   Hormones (From admission, onward)      None          Pressors:    Autonomic Drugs (From admission, onward)      Start     Stop Route Frequency Ordered    06/29/23 2100  metoprolol tartrate (LOPRESSOR) split tablet 12.5 mg         -- Oral Every 12 hours 06/29/23 1236          Hyperglycemia/Diabetes Medications:   Antihyperglycemics (From admission, onward)      None          "

## 2023-06-30 NOTE — PROGRESS NOTES
[facility]  Neurosurgery  Progress Note    Subjective:     History of Present Illness: 28 F presents for elective prolactinoma resection on 6/29      Post-Op Info:  Procedure(s) (LRB):  HYPOPHYSECTOMY, TRANSSPHENOIDAL APPROACH (N/A)   1 Day Post-Op     Interval History: 6/30: OR yesterday, tolerated procedure well, no leakage from nose. Headache improved. Na 136. Spec grave 1.03.     Medications:  Continuous Infusions:  Scheduled Meds:   acetaminophen  1,000 mg Oral Q8H    cetirizine  5 mg Oral QHS    enoxparin  40 mg Subcutaneous Q24H (prophylaxis, 1700)    metoprolol tartrate  12.5 mg Oral Q12H    polyethylene glycol  17 g Oral Daily    senna-docusate 8.6-50 mg  1 tablet Oral Daily    sodium chloride  1 spray Each Nostril QID     PRN Meds:diazePAM, hydrALAZINE, labetalol, mupirocin, ondansetron, oxyCODONE, oxyCODONE, oxymetazoline, promethazine IVPB, pseudoephedrine     Review of Systems  Objective:     Weight: 92.1 kg (203 lb)  Body mass index is 35.96 kg/m².  Vital Signs (Most Recent):  Temp: 98.5 °F (36.9 °C) (06/30/23 0701)  Pulse: 84 (06/30/23 0803)  Resp: 13 (06/30/23 0803)  BP: 102/62 (06/30/23 0803)  SpO2: 97 % (06/30/23 0803) Vital Signs (24h Range):  Temp:  [97 °F (36.1 °C)-98.5 °F (36.9 °C)] 98.5 °F (36.9 °C)  Pulse:  [56-97] 84  Resp:  [10-25] 13  SpO2:  [94 %-100 %] 97 %  BP: ()/(52-74) 102/62     Date 06/30/23 0700 - 07/01/23 0659   Shift 6135-8535 5659-5201 8375-9232 24 Hour Total   INTAKE   Shift Total(mL/kg)       OUTPUT   Urine(mL/kg/hr) 0   0   Shift Total(mL/kg) 0(0)   0(0)   Weight (kg) 92.1 92.1 92.1 92.1                       Female External Urinary Catheter 06/29/23 2100 (Active)   Output (mL) 0 mL 06/30/23 0701          Physical Exam         Neurosurgery Physical Exam    Physical Exam:    Constitutional: No distress.     HEENT: atraumatic/normocephalic    Cardiovascular: Regular rhythm.     Pulm: aerating well, saturating well    Abdominal: Soft.      Psych/Behavior: He is alert.     E4V5M6  AOx3  PERRL  EOMI  Face Symmetric  Tongue midline  BUE 5/5  BLE 5/5  No drift      Significant Labs:  Recent Labs   Lab 06/29/23  1551 06/29/23  1840 06/30/23  0039 06/30/23  0616   GLU  --   --  143*  --    NA  --  136 136 139   K  --   --  3.9  --    CL  --   --  106  --    CO2  --   --  21*  --    BUN  --   --  7  --    CREATININE 0.7  --  0.7  --    CALCIUM  --   --  9.0  --    MG  --   --  2.0  --      Recent Labs   Lab 06/30/23  0616   WBC 13.46*   HGB 14.2   HCT 42.4        No results for input(s): LABPT, INR, APTT in the last 48 hours.  Microbiology Results (last 7 days)       ** No results found for the last 168 hours. **          All pertinent labs from the last 24 hours have been reviewed.    Significant Diagnostics:  I have reviewed all pertinent imaging results/findings within the past 24 hours.    Assessment/Plan:     * Pituitary adenoma  28 F presents for elective prolactinoma resection on 6/29    -- ICU post op, OK for TTF today  -- q4 hour neurochecks  -- SBP < 160  -- post op CT with expected post op changes  -- DI watch: strict I/O, q6 Na/UA  -- No steroids, AM cortisol 20  -- OK for diet  -- OK for OOB  -- OK for SQH 24 hours after surgery     -- Possible DC home later today         Boo Hodge MD  Neurosurgery  [facility]

## 2023-06-30 NOTE — NURSING TRANSFER
Nursing Transfer Note      6/30/2023     Reason patient is being transferred: stepdown to hospital floor     Transfer To: 930    Transfer via bed    Transfer with cardiac monitoring    Transported by: SOLA Ackerman    Medicines sent: NS nasal spray; afrin     Chart send with patient: Yes    Notified: spouse    Upon arrival to floor: cardiac monitor applied, patient oriented to room, call bell in reach, and bed in lowest position

## 2023-06-30 NOTE — NURSING
Patient received from [facility] alert and oriented x4, VSS, bed in lowest position call light within reach

## 2023-06-30 NOTE — HPI
Ms. Bernarda Cordero is a 28 y.o. 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 This is a patient who initially presented with worsening galactorrhea after her first pregnancy. She was found to have 1.4 cm cystic pituitary lesion during her workup. Denies vision compromises during that time. She attempted cabergoline with routine monitoring at which the lesion remained stable. HVF done in 2022 WNL. Upon evaluation today, the pt continues to take cabergoline. She has mildly irregular cycles, but no further lactation. Prolactin mildly elevated.  MRI Pituitary W WO Contrast (3/20/2023): 1.4 x 1.2 x 1.2 cm primarily cystic mass arising right sided pituitary gland.  I have recommended TSR. I have discussed the risks/benefits, indications, and alternatives for the proposed procedure in detail. I have answered all of their questions and patient wish to proceed with surgery.

## 2023-06-30 NOTE — NURSING
Nurses Note -- 4 Eyes      6/30/2023   5:19 PM      Skin assessed during: Transfer      [x] No Altered Skin Integrity Present    [x]Prevention Measures Documented      [] Yes- Altered Skin Integrity Present or Discovered   [] LDA Added if Not in Epic (Describe Wound)   [] New Altered Skin Integrity was Present on Admit and Documented in LDA   [] Wound Image Taken    Wound Care Consulted? No    Attending Nurse:  Mana Humphrey RN     Second RN/Staff Member:      FAVIOLA Aleman

## 2023-07-01 ENCOUNTER — PATIENT MESSAGE (OUTPATIENT)
Dept: ENDOCRINOLOGY | Facility: CLINIC | Age: 28
End: 2023-07-01
Payer: COMMERCIAL

## 2023-07-01 VITALS
SYSTOLIC BLOOD PRESSURE: 106 MMHG | DIASTOLIC BLOOD PRESSURE: 64 MMHG | BODY MASS INDEX: 35.97 KG/M2 | RESPIRATION RATE: 18 BRPM | HEIGHT: 63 IN | HEART RATE: 67 BPM | WEIGHT: 203 LBS | TEMPERATURE: 98 F | OXYGEN SATURATION: 99 %

## 2023-07-01 DIAGNOSIS — D35.2 PITUITARY ADENOMA: Primary | ICD-10-CM

## 2023-07-01 LAB
ALBUMIN SERPL BCP-MCNC: 3.3 G/DL (ref 3.5–5.2)
ALP SERPL-CCNC: 41 U/L (ref 55–135)
ALT SERPL W/O P-5'-P-CCNC: 11 U/L (ref 10–44)
ANION GAP SERPL CALC-SCNC: 7 MMOL/L (ref 8–16)
AST SERPL-CCNC: 13 U/L (ref 10–40)
BASOPHILS # BLD AUTO: 0.04 K/UL (ref 0–0.2)
BASOPHILS NFR BLD: 0.4 % (ref 0–1.9)
BILIRUB SERPL-MCNC: 0.3 MG/DL (ref 0.1–1)
BILIRUB UR QL STRIP: NEGATIVE
BILIRUB UR QL STRIP: NEGATIVE
BUN SERPL-MCNC: 10 MG/DL (ref 6–20)
CALCIUM SERPL-MCNC: 8.6 MG/DL (ref 8.7–10.5)
CHLORIDE SERPL-SCNC: 106 MMOL/L (ref 95–110)
CLARITY UR REFRACT.AUTO: CLEAR
CLARITY UR REFRACT.AUTO: CLEAR
CO2 SERPL-SCNC: 25 MMOL/L (ref 23–29)
COLOR UR AUTO: YELLOW
COLOR UR AUTO: YELLOW
CREAT SERPL-MCNC: 0.8 MG/DL (ref 0.5–1.4)
DIFFERENTIAL METHOD: ABNORMAL
EOSINOPHIL # BLD AUTO: 0.2 K/UL (ref 0–0.5)
EOSINOPHIL NFR BLD: 2.1 % (ref 0–8)
ERYTHROCYTE [DISTWIDTH] IN BLOOD BY AUTOMATED COUNT: 12.5 % (ref 11.5–14.5)
EST. GFR  (NO RACE VARIABLE): >60 ML/MIN/1.73 M^2
GLUCOSE SERPL-MCNC: 94 MG/DL (ref 70–110)
GLUCOSE UR QL STRIP: NEGATIVE
GLUCOSE UR QL STRIP: NEGATIVE
HCT VFR BLD AUTO: 36.4 % (ref 37–48.5)
HGB BLD-MCNC: 12.3 G/DL (ref 12–16)
HGB UR QL STRIP: NEGATIVE
HGB UR QL STRIP: NEGATIVE
IMM GRANULOCYTES # BLD AUTO: 0.03 K/UL (ref 0–0.04)
IMM GRANULOCYTES NFR BLD AUTO: 0.3 % (ref 0–0.5)
KETONES UR QL STRIP: NEGATIVE
KETONES UR QL STRIP: NEGATIVE
LEUKOCYTE ESTERASE UR QL STRIP: NEGATIVE
LEUKOCYTE ESTERASE UR QL STRIP: NEGATIVE
LYMPHOCYTES # BLD AUTO: 3.1 K/UL (ref 1–4.8)
LYMPHOCYTES NFR BLD: 34 % (ref 18–48)
MAGNESIUM SERPL-MCNC: 2 MG/DL (ref 1.6–2.6)
MCH RBC QN AUTO: 29.9 PG (ref 27–31)
MCHC RBC AUTO-ENTMCNC: 33.8 G/DL (ref 32–36)
MCV RBC AUTO: 88 FL (ref 82–98)
MONOCYTES # BLD AUTO: 0.7 K/UL (ref 0.3–1)
MONOCYTES NFR BLD: 7.2 % (ref 4–15)
NEUTROPHILS # BLD AUTO: 5.1 K/UL (ref 1.8–7.7)
NEUTROPHILS NFR BLD: 56 % (ref 38–73)
NITRITE UR QL STRIP: NEGATIVE
NITRITE UR QL STRIP: NEGATIVE
NRBC BLD-RTO: 0 /100 WBC
PH UR STRIP: 6 [PH] (ref 5–8)
PH UR STRIP: 7 [PH] (ref 5–8)
PHOSPHATE SERPL-MCNC: 3.6 MG/DL (ref 2.7–4.5)
PLATELET # BLD AUTO: 238 K/UL (ref 150–450)
PMV BLD AUTO: 10.9 FL (ref 9.2–12.9)
POTASSIUM SERPL-SCNC: 3.5 MMOL/L (ref 3.5–5.1)
PROT SERPL-MCNC: 5.7 G/DL (ref 6–8.4)
PROT UR QL STRIP: ABNORMAL
PROT UR QL STRIP: NEGATIVE
RBC # BLD AUTO: 4.12 M/UL (ref 4–5.4)
SODIUM SERPL-SCNC: 138 MMOL/L (ref 136–145)
SODIUM SERPL-SCNC: 139 MMOL/L (ref 136–145)
SODIUM SERPL-SCNC: 142 MMOL/L (ref 136–145)
SP GR UR STRIP: 1.02 (ref 1–1.03)
SP GR UR STRIP: 1.02 (ref 1–1.03)
URN SPEC COLLECT METH UR: ABNORMAL
URN SPEC COLLECT METH UR: NORMAL
WBC # BLD AUTO: 9.13 K/UL (ref 3.9–12.7)

## 2023-07-01 PROCEDURE — 84295 ASSAY OF SERUM SODIUM: CPT | Performed by: STUDENT IN AN ORGANIZED HEALTH CARE EDUCATION/TRAINING PROGRAM

## 2023-07-01 PROCEDURE — 80053 COMPREHEN METABOLIC PANEL: CPT

## 2023-07-01 PROCEDURE — 99024 POSTOP FOLLOW-UP VISIT: CPT | Mod: ,,, | Performed by: PHYSICIAN ASSISTANT

## 2023-07-01 PROCEDURE — 81003 URINALYSIS AUTO W/O SCOPE: CPT | Mod: 91 | Performed by: STUDENT IN AN ORGANIZED HEALTH CARE EDUCATION/TRAINING PROGRAM

## 2023-07-01 PROCEDURE — 84295 ASSAY OF SERUM SODIUM: CPT | Mod: 91 | Performed by: STUDENT IN AN ORGANIZED HEALTH CARE EDUCATION/TRAINING PROGRAM

## 2023-07-01 PROCEDURE — 94761 N-INVAS EAR/PLS OXIMETRY MLT: CPT

## 2023-07-01 PROCEDURE — 25000003 PHARM REV CODE 250

## 2023-07-01 PROCEDURE — 99232 SBSQ HOSP IP/OBS MODERATE 35: CPT | Mod: ,,, | Performed by: INTERNAL MEDICINE

## 2023-07-01 PROCEDURE — 99232 PR SUBSEQUENT HOSPITAL CARE,LEVL II: ICD-10-PCS | Mod: ,,, | Performed by: INTERNAL MEDICINE

## 2023-07-01 PROCEDURE — 83735 ASSAY OF MAGNESIUM: CPT

## 2023-07-01 PROCEDURE — 99024 PR POST-OP FOLLOW-UP VISIT: ICD-10-PCS | Mod: ,,, | Performed by: PHYSICIAN ASSISTANT

## 2023-07-01 PROCEDURE — 84100 ASSAY OF PHOSPHORUS: CPT

## 2023-07-01 PROCEDURE — 85025 COMPLETE CBC W/AUTO DIFF WBC: CPT

## 2023-07-01 PROCEDURE — 25000003 PHARM REV CODE 250: Performed by: PHYSICIAN ASSISTANT

## 2023-07-01 PROCEDURE — 36415 COLL VENOUS BLD VENIPUNCTURE: CPT | Performed by: STUDENT IN AN ORGANIZED HEALTH CARE EDUCATION/TRAINING PROGRAM

## 2023-07-01 PROCEDURE — 81003 URINALYSIS AUTO W/O SCOPE: CPT | Performed by: STUDENT IN AN ORGANIZED HEALTH CARE EDUCATION/TRAINING PROGRAM

## 2023-07-01 RX ORDER — ONDANSETRON 4 MG/1
4 TABLET, ORALLY DISINTEGRATING ORAL EVERY 8 HOURS PRN
Qty: 21 TABLET | Refills: 0 | Status: SHIPPED | OUTPATIENT
Start: 2023-07-01

## 2023-07-01 RX ADMIN — POLYETHYLENE GLYCOL 3350 17 G: 17 POWDER, FOR SOLUTION ORAL at 08:07

## 2023-07-01 RX ADMIN — SENNOSIDES AND DOCUSATE SODIUM 1 TABLET: 50; 8.6 TABLET ORAL at 08:07

## 2023-07-01 RX ADMIN — PSEUDOEPHEDRINE HCL 60 MG: 30 TABLET, FILM COATED ORAL at 08:07

## 2023-07-01 RX ADMIN — ACETAMINOPHEN 1000 MG: 500 TABLET ORAL at 05:07

## 2023-07-01 RX ADMIN — NASAL 1 SPRAY: 6.5 SPRAY NASAL at 08:07

## 2023-07-01 RX ADMIN — OXYMETAZOLINE HYDROCHLORIDE 2 SPRAY: 0.05 SPRAY NASAL at 08:07

## 2023-07-01 NOTE — PLAN OF CARE
[facility] - Neurosurgery (Hospital)  Discharge Final Note    Primary Care Provider: Kaiser Paulino MD    Expected Discharge Date: 7/1/2023    Patient to be discharged home.  The patient does not have any home needs.  Family to provide transportation home.  Neurosurgery clinic to schedule follow up appointment.    Future Appointments   Date Time Provider Department Center   7/6/2023 11:50 AM LAB, [facility] RV[facility] LAB [facility]   7/21/2023  8:30 AM Alex Casillas MD [facility] ENDODIA [facility]   7/21/2023  2:15 PM Hilario Nevarez MD [facility] NEUROS [facility]        Final Discharge Note (most recent)       Final Note - 07/01/23 1116          Final Note    Assessment Type Final Discharge Note     Anticipated Discharge Disposition Home or Self Care        Post-Acute Status    Post-Acute Authorization Other     Other Status No Post-Acute Service Needs     Discharge Delays None known at this time                     Important Message from Medicare             Contact Info       Kaiser Paulino MD   Specialty: Family Medicine   Relationship: PCP - General    [place] [id number]   Phone: [phone]       Next Steps: Go on 7/7/2023    Instructions: Hospital follow up at 10 AM

## 2023-07-01 NOTE — PROGRESS NOTES
"[facility] - Neurosurgery ([facility])  Endocrinology  Progress Note    Admit Date: 6/29/2023     Bernarda Cordero is a 28 y.o. woman who presented for planned resection of a cystic sellar mass.  She became established in the [facility] endocrine clinic in April with Dr. Abbott where she also was seen by Dr. Nevarez with neurosurgery.  She was followed previously by Dr. Huerta, endocrinology, following a history of worsening galactorrhea despite cessation breast feeding and was found to have 1.4 cm cystic pituitary lesion during her workup and mildly elevated prolactin level. She denied vision compromises during that time.  She has been on Cabergoline therapy prior to her TSR.  Laboratory workup was not concerning except for history of prolactin elevation (on outside labs - see below).  Surgery today (06/29/2023) with Dr. Nevarez via TSR approach.      Endocrine is now consulted for post-op TSR monitoring of hormone levels including DI monitoring/treatment    Prior Imaging:     MRI Pituitary: 11/01/2021  Cystic and solid mass in the pituitary gland with its epicenter in the right side of the gland 1.4 x 1.2 x 1.2 cm and displaces the rest of the gland parenchyma to the left as well as the infundibulum, which enhances normally, is normal in diameter, and extends toward the hypothalamic region, which is unremarkable. This also exerts some mass effect on the most distal chiasm and proximal post chiasmatic optic nerves."  Impression: "1.4 x 1.2 x 1.2 cm primarily cystic mass arising right sided pituitary gland is most likely a cystic or necrotic pituitary microadenoma given the history     Most Recent Imaging:  MRI Pituitary: 03/20/2023  No significant change in the 1.4 x 1.2 x 1.2 cm primarily cystic mass arising right sided pituitary gland is most likely a cystic or necrotic pituitary microadenoma given the history.      Lab Results   Component Value Date    TSH 1.65 10/03/2008     06/29/2023     06/29/2023    NA " "139 06/29/2023     06/20/2023    K 4.2 06/20/2023    K 4.0 02/01/2017    CALCIUM 9.2 06/20/2023    CALCIUM 9.0 02/01/2017    ALBUMIN 4.1 06/20/2023    ALBUMIN 3.6 02/01/2017    ESTGFRAFRICA >60.0 02/01/2017    EGFRNONAA >60.0 02/01/2017     Prior to surgery she was on Cabergoline 0.25 mg twice a week.        PROLACTIN LEVELS: Oct 2021 prl: 54.9; Nov 2021 prl: 48.5  June 2022 prl: 37.5 and still had galactorrhea and wasn't losing weight. STARTED CABERGOLINE.  Aug 2022 prl: 2.0  Dec 2022 prl: 9.0 and stopped her cabergoline after a Nov 2022 ER visit for palpitations  Jan 2023  prl: 31.7 Cabergoline restarted  March 2023  prl: 2.1         Interval HPI:   Overnight events: No events reported.  UOP reviewed and appropriate.  No concerns for DI.  No steroids given prior normal cortisol level.  Possible DC soon per NSG timeframe    /69 (BP Location: Left arm, Patient Position: Lying)   Pulse 68   Temp 97.8 °F (36.6 °C) (Oral)   Resp 18   Ht 5' 3" (1.6 m)   Wt 92.1 kg (203 lb)   SpO2 96%   Breastfeeding No   BMI 35.96 kg/m²     Labs Reviewed and Include    Recent Labs   Lab 07/01/23  0458   GLU 94   CALCIUM 8.6*   ALBUMIN 3.3*   PROT 5.7*     138   K 3.5   CO2 25      BUN 10   CREATININE 0.8   ALKPHOS 41*   ALT 11   AST 13   BILITOT 0.3     Lab Results   Component Value Date    WBC 9.13 07/01/2023    HGB 12.3 07/01/2023    HCT 36.4 (L) 07/01/2023    MCV 88 07/01/2023     07/01/2023     No results for input(s): TSH, FREET4 in the last 168 hours.  Lab Results   Component Value Date    HGBA1C 5.3 06/12/2023       Nutritional status:   Body mass index is 35.96 kg/m².  Lab Results   Component Value Date    ALBUMIN 3.3 (L) 07/01/2023    ALBUMIN 3.4 (L) 06/30/2023    ALBUMIN 4.1 06/20/2023     No results found for: PREALBUMIN    Estimated Creatinine Clearance: 111.9 mL/min (based on SCr of 0.8 mg/dL).    Accu-Checks  No results for input(s): POCTGLUCOSE in the last 72 hours.    Current " Medications and/or Treatments Impacting Glycemic Control  Immunotherapy:    Immunosuppressants       None          Steroids:   Hormones (From admission, onward)      None          Pressors:    Autonomic Drugs (From admission, onward)      Start     Stop Route Frequency Ordered    06/29/23 2100  metoprolol tartrate (LOPRESSOR) split tablet 12.5 mg         -- Oral Every 12 hours 06/29/23 1236          Hyperglycemia/Diabetes Medications:   Antihyperglycemics (From admission, onward)      None            ASSESSMENT and PLAN    Endocrine  * Pituitary adenoma  Key History and Diagnostic Findings  - Pituitary macroadenoma status-post transsphenoidal surgery on 06/29/2023, history of prolactin elevation  - Endocrine consulted for post-surgical monitoring for Central Diabetes Insipidus which can manifest within the first five post-operative days    Plan  - Neuro check per neurology protocol, continue sinus precautions and monitoring per neurosurgery    - On 06/30/23 8:00 AM cortisol with normal result so no biochemical evidence of adrenal insufficiency, no steroids required at this time    - Monitor for diabetes insipidus with Q6Hr Urinalysis (urine specific gravity), strict monitoring of intake/output hourly, and if urine output greater than 250 cc for over 2 hours, draw BMP, serum Osm, urine Osm, urine sodium, and urinalysis stat and notify primary team and endocrine on-call  - Give desmopressin (DDAVP 10 mcg intranasal x1 dose) only if:  - Urine output greater than 250cc for more two or more consecutive hours  - and NA greater than 137 with uptrend  - and low urinary specific gravity less than 1.010  - and inability of the patient to match fluid intake reasonably  - Please page endocrine if questions    - If patient alert and has intact thirst response, encourage to drink to thirst. If significant bothersome nocturia, patients may receive intranasal DDAVP (in non-operated nostril). Contact primary team prior to giving  DDAVP  - If patient altered or if patient unable to drink sufficiently to maintain euvolemia, then start DDAVP PRN. Avoid standing doses of DDAVP in patients without pre-existing DI as it may be self-limited    - Prolactin level on labs improved and appropriately low following surgery    Discharge Recommendations  - Discharge per transsphenoidal resection protocol when ready from a surgical standpoint  - Other pituitary hormones can be monitored outpatient in pituitary clinic  - Will require outpatient CMP 7-10 days post-operatively to monitor for SIADH or persistent DI, set up for 07/06/2023  - Follow up in Endocrine Clinic in 2 weeks, set up with Dr. Casillas for 07/21/2023 at 8:30 am for virtual appointment    Labs are important to complete on 07/06/2023.  Separate message sent to patient with instruction that these can be at [facility], [facility] or [facility].  We do not have acces to results from [facility] so unable to perform these there.  Has follow up visit already as above.        Geovany Vuong, DO  Endocrinology

## 2023-07-01 NOTE — ASSESSMENT & PLAN NOTE
Key History and Diagnostic Findings  - Pituitary macroadenoma status-post transsphenoidal surgery on 06/29/2023, history of prolactin elevation  - Endocrine consulted for post-surgical monitoring for Central Diabetes Insipidus which can manifest within the first five post-operative days    Plan  - Neuro check per neurology protocol, continue sinus precautions and monitoring per neurosurgery    - On 06/30/23 8:00 AM cortisol with normal result so no biochemical evidence of adrenal insufficiency, no steroids required at this time    - Monitor for diabetes insipidus with Q6Hr Urinalysis (urine specific gravity), strict monitoring of intake/output hourly, and if urine output greater than 250 cc for over 2 hours, draw BMP, serum Osm, urine Osm, urine sodium, and urinalysis stat and notify primary team and endocrine on-call  - Give desmopressin (DDAVP 10 mcg intranasal x1 dose) only if:  - Urine output greater than 250cc for more two or more consecutive hours  - and NA greater than 137 with uptrend  - and low urinary specific gravity less than 1.010  - and inability of the patient to match fluid intake reasonably  - Please page endocrine if questions    - If patient alert and has intact thirst response, encourage to drink to thirst. If significant bothersome nocturia, patients may receive intranasal DDAVP (in non-operated nostril). Contact primary team prior to giving DDAVP  - If patient altered or if patient unable to drink sufficiently to maintain euvolemia, then start DDAVP PRN. Avoid standing doses of DDAVP in patients without pre-existing DI as it may be self-limited    - Prolactin level on labs improved and appropriately low following surgery    Discharge Recommendations  - Discharge per transsphenoidal resection protocol when ready from a surgical standpoint  - Other pituitary hormones can be monitored outpatient in pituitary clinic  - Will require outpatient CMP 7-10 days post-operatively to monitor for SIADH or  persistent DI, set up for 07/06/2023  - Follow up in Endocrine Clinic in 2 weeks, set up with Dr. Casillas for 07/21/2023 at 8:30 am for virtual appointment

## 2023-07-01 NOTE — SUBJECTIVE & OBJECTIVE
"Interval HPI:   Overnight events: No events reported.  UOP reviewed and appropriate.  No concerns for DI.  No steroids given prior normal cortisol level.  Possible DC soon per NSG timeframe    /69 (BP Location: Left arm, Patient Position: Lying)   Pulse 68   Temp 97.8 °F (36.6 °C) (Oral)   Resp 18   Ht 5' 3" (1.6 m)   Wt 92.1 kg (203 lb)   SpO2 96%   Breastfeeding No   BMI 35.96 kg/m²     Labs Reviewed and Include    Recent Labs   Lab 07/01/23  0458   GLU 94   CALCIUM 8.6*   ALBUMIN 3.3*   PROT 5.7*     138   K 3.5   CO2 25      BUN 10   CREATININE 0.8   ALKPHOS 41*   ALT 11   AST 13   BILITOT 0.3     Lab Results   Component Value Date    WBC 9.13 07/01/2023    HGB 12.3 07/01/2023    HCT 36.4 (L) 07/01/2023    MCV 88 07/01/2023     07/01/2023     No results for input(s): TSH, FREET4 in the last 168 hours.  Lab Results   Component Value Date    HGBA1C 5.3 06/12/2023       Nutritional status:   Body mass index is 35.96 kg/m².  Lab Results   Component Value Date    ALBUMIN 3.3 (L) 07/01/2023    ALBUMIN 3.4 (L) 06/30/2023    ALBUMIN 4.1 06/20/2023     No results found for: PREALBUMIN    Estimated Creatinine Clearance: 111.9 mL/min (based on SCr of 0.8 mg/dL).    Accu-Checks  No results for input(s): POCTGLUCOSE in the last 72 hours.    Current Medications and/or Treatments Impacting Glycemic Control  Immunotherapy:    Immunosuppressants       None          Steroids:   Hormones (From admission, onward)      None          Pressors:    Autonomic Drugs (From admission, onward)      Start     Stop Route Frequency Ordered    06/29/23 2100  metoprolol tartrate (LOPRESSOR) split tablet 12.5 mg         -- Oral Every 12 hours 06/29/23 1236          Hyperglycemia/Diabetes Medications:   Antihyperglycemics (From admission, onward)      None          "

## 2023-07-01 NOTE — PLAN OF CARE
Problem: Adult Inpatient Plan of Care  Goal: Optimal Comfort and Wellbeing  Outcome: Ongoing, Progressing  Goal: Readiness for Transition of Care  Outcome: Ongoing, Progressing     Problem: Adult Inpatient Plan of Care  Goal: Optimal Comfort and Wellbeing  Outcome: Ongoing, Progressing     Problem: Adult Inpatient Plan of Care  Goal: Readiness for Transition of Care  Outcome: Ongoing, Progressing     Problem: Impaired Wound Healing  Goal: Optimal Wound Healing  Outcome: Ongoing, Progressing     Problem: Impaired Wound Healing  Goal: Optimal Wound Healing  Outcome: Ongoing, Progressing       POC reviewed with patient and her mother at the beside. Verbalization of understanding voiced. Questions and concerns addressed. Precautions maintained. Patient progressing towards goals. No events noted. Cardiac monitoring ongoing. Vital signs all shift. See flow sheet. Patient ambulatory without difficulty. Bed low and locked with call light within reach. Patients mother remains at the beside. POC ongoing.

## 2023-07-01 NOTE — DISCHARGE SUMMARY
[facility] - Neurosurgery ([facility])  Neurosurgery  Discharge Summary      Patient Name: Bernarda Cordero  MRN: [id number]  Admission Date: 6/29/2023  Hospital Length of Stay: 2 days  Discharge Date and Time:  07/01/2023 11:29 AM  Attending Physician: Hilario Nevarez MD   Discharging Provider: Lovely Davis PA-C  Primary Care Provider: Kaiser Paulino MD    HPI:   Ms. Bernarda Cordero is a 28 y.o. 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 This is a patient who initially presented with worsening galactorrhea after her first pregnancy. She was found to have 1.4 cm cystic pituitary lesion during her workup. Denies vision compromises during that time. She attempted cabergoline with routine monitoring at which the lesion remained stable. HVF done in 2022 WNL. Upon evaluation today, the pt continues to take cabergoline. She has mildly irregular cycles, but no further lactation. Prolactin mildly elevated.  MRI Pituitary W WO Contrast (3/20/2023): 1.4 x 1.2 x 1.2 cm primarily cystic mass arising right sided pituitary gland.  I have recommended TSR. I have discussed the risks/benefits, indications, and alternatives for the proposed procedure in detail. I have answered all of their questions and patient wish to proceed with surgery.       Procedure(s) (LRB):  HYPOPHYSECTOMY, TRANSSPHENOIDAL APPROACH (N/A)     Hospital Course: 6/30: OR yesterday, tolerated procedure well, no leakage from nose. Headache improved. Na 136. Spec grave 1.03.   7/1: NAEON. Reports mild headache controlled with current regimen. No nasal drainage. Denies polyuria, polydipsia, salty taste in throat. Compliant with nasal precautions. Labs stable, no evidence of DI at this time. Stable for discharge home. All questions answered.    General: well developed, well nourished, no distress.   Head: normocephalic, atraumatic  Neurologic: Alert and oriented. Thought content appropriate.  GCS: Motor: 6/Verbal: 5/Eyes: 4 GCS Total: 15  Mental  Status: Awake, Alert, Oriented x 4  Language: No aphasia  Speech: No dysarthria  Cranial nerves: face symmetric, tongue midline, CN II-XII grossly intact.   Eyes: pupils equal, round, reactive to light with accomodation, EOMI.   Pulmonary: normal respirations, no signs of respiratory distress  Abdomen: soft, non-distended, not tender to palpation  Sensory: intact to light touch throughout  Motor Strength: Moves all extremities spontaneously with good tone.  Full strength upper and lower extremities. No abnormal movements seen.   Pronator Drift: no drift noted  Finger-to-nose: Intact bilaterally  Skin: Skin is warm, dry and intact.        Goals of Care Treatment Preferences:    Full code      Consults:   Consults (From admission, onward)        Status Ordering Provider     Inpatient consult to Endocrinology  Once        Provider:  (Not yet assigned)    Completed FELICIA LORENZ          Significant Diagnostic Studies: Labs:   BMP:   Recent Labs   Lab 06/29/23  1551 06/29/23  1840 06/30/23  0039 06/30/23  0616 06/30/23  1217 07/01/23  0003 07/01/23  0458   GLU  --   --  143*  --   --   --  94   NA  --    < > 136   < > 137 139 142  138   K  --   --  3.9  --   --   --  3.5   CL  --   --  106  --   --   --  106   CO2  --   --  21*  --   --   --  25   BUN  --   --  7  --   --   --  10   CREATININE 0.7  --  0.7  --   --   --  0.8   CALCIUM  --   --  9.0  --   --   --  8.6*   MG  --   --  2.0  --   --   --  2.0    < > = values in this interval not displayed.   , CBC   Recent Labs   Lab 06/30/23  0616 07/01/23  0458   WBC 13.46* 9.13   HGB 14.2 12.3   HCT 42.4 36.4*    238    and All labs within the past 24 hours have been reviewed  Radiology:   CTH without contrast    Pending Diagnostic Studies:     Procedure Component Value Units Date/Time    Urinalysis [[id number]] Collected: 06/29/23 1540    Order Status: Sent Lab Status: In process Updated: 06/29/23 1612    Specimen: Urine         Final Active Diagnoses:     Diagnosis Date Noted POA    PRINCIPAL PROBLEM:  Pituitary adenoma [D35.2] 04/04/2023 Yes    History of sinus tachycardia [Z86.79] 04/04/2023 Not Applicable    Anxiety disorder [F41.9] 06/19/2018 Yes      Problems Resolved During this Admission:      Discharged Condition: good     Disposition: Home or Self Care    Follow Up:   Follow-up Information     Kaiser Paulino MD. Go on 7/7/2023.    Specialty: Family Medicine  Why: Hospital follow up at 10 AM  Contact information:  [address] [id number] [phone]                       Patient Instructions:      Notify your health care provider if you experience any of the following:  temperature >100.4     Notify your health care provider if you experience any of the following:  persistent nausea and vomiting or diarrhea     Notify your health care provider if you experience any of the following:  severe uncontrolled pain     Notify your health care provider if you experience any of the following:  redness, tenderness, or signs of infection (pain, swelling, redness, odor or green/yellow discharge around incision site)     Notify your health care provider if you experience any of the following:  difficulty breathing or increased cough     Notify your health care provider if you experience any of the following:  severe persistent headache     Notify your health care provider if you experience any of the following:  worsening rash     Notify your health care provider if you experience any of the following:  persistent dizziness, light-headedness, or visual disturbances     Notify your health care provider if you experience any of the following:  increased confusion or weakness     Activity as tolerated     Medications:  Reconciled Home Medications:      Medication List      START taking these medications    butalbital-acetaminophen-caffeine -40 mg -40 mg per tablet  Commonly known as: FIORICET, ESGIC  Take 1 tablet by mouth every 4 (four) hours  as needed for Pain.     metoprolol tartrate 25 MG tablet  Commonly known as: LOPRESSOR  Take ½ tablet (12.5 mg total) by mouth every 12 (twelve) hours.     oxyCODONE-acetaminophen 5-325 mg per tablet  Commonly known as: PERCOCET  Take 1 tablet by mouth every 6 (six) hours as needed for Pain.     oxymetazoline 0.05 % nasal spray  Commonly known as: AFRIN  2 sprays by Nasal route 2 (two) times daily as needed for Congestion.     polyethylene glycol 17 gram Pwpk  Commonly known as: GLYCOLAX  Take 17 g by mouth once daily.     pseudoephedrine 60 MG tablet  Commonly known as: SUDAFED  Take 1 tablet (60 mg total) by mouth every 6 (six) hours as needed for Congestion.     senna-docusate 8.6-50 mg 8.6-50 mg per tablet  Commonly known as: PERICOLACE  Take 2 tablets by mouth once daily.     sodium chloride 0.65 % nasal spray  Commonly known as: OCEAN  1 spray by Nasal route 4 (four) times daily. for 10 days        CONTINUE taking these medications    albuterol 90 mcg/actuation inhaler  Commonly known as: PROVENTIL/VENTOLIN HFA  as needed.     * BEPREVE 1.5 % Drop  Generic drug: bepotastine besilate  Place 1 drop into both eyes 2 (two) times daily.     * bepotastine besilate 1.5 % Drop  Commonly known as: BEPREVE  Place 1 drop into both eyes once daily.     cetirizine 5 MG tablet  Commonly known as: ZYRTEC  Take 5 mg by mouth daily as needed for Allergies.     ergocalciferol 50,000 unit Cap  Commonly known as: ERGOCALCIFEROL  Take 50,000 Units by mouth.     hypochlorous acid-sodium chlor 0.01 % Spry  Commonly known as: AVENOVA  Avenova 0.01 % topical spray     multivitamin per tablet  Commonly known as: THERAGRAN  Take 1 tablet by mouth once daily.     OMEGA-3 FISH OIL ORAL  Take 1 capsule by mouth once daily.     RESTASIS 0.05 % ophthalmic emulsion  Generic drug: cycloSPORINE  Place 1 drop into both eyes 2 (two) times daily.     XIIDRA 5 % Dpet  Generic drug: lifitegrast  Apply to eye 2 (two) times daily.         * This list  has 2 medication(s) that are the same as other medications prescribed for you. Read the directions carefully, and ask your doctor or other care provider to review them with you.            STOP taking these medications    cabergoline 0.5 mg tablet  Commonly known as: DOSTINEX     ibuprofen 20 mg/mL oral liquid     metoprolol succinate 25 MG 24 hr tablet  Commonly known as: TOPROL-XL            Lovely Davis PA-C  Neurosurgery  [facility] - Neurosurgery [facility])

## 2023-07-03 LAB
BLD PROD TYP BPU: NORMAL
BLD PROD TYP BPU: NORMAL
BLOOD UNIT EXPIRATION DATE: NORMAL
BLOOD UNIT EXPIRATION DATE: NORMAL
BLOOD UNIT TYPE CODE: 5100
BLOOD UNIT TYPE CODE: 5100
BLOOD UNIT TYPE: NORMAL
BLOOD UNIT TYPE: NORMAL
CODING SYSTEM: NORMAL
CODING SYSTEM: NORMAL
CROSSMATCH INTERPRETATION: NORMAL
CROSSMATCH INTERPRETATION: NORMAL
DISPENSE STATUS: NORMAL
DISPENSE STATUS: NORMAL
TRANS ERYTHROCYTES VOL PATIENT: NORMAL ML
TRANS ERYTHROCYTES VOL PATIENT: NORMAL ML

## 2023-07-05 ENCOUNTER — PATIENT OUTREACH (OUTPATIENT)
Dept: ADMINISTRATIVE | Facility: CLINIC | Age: 28
End: 2023-07-05
Payer: COMMERCIAL

## 2023-07-05 NOTE — PROGRESS NOTES
C3 nurse spoke with Bernarda Cordero  for a TCC post hospital discharge follow up call. The patient has a scheduled HOSFU appointment with Kaiser Paulino MD on 07/07/2023 @ 1000.    Patient states the listed PCP is no longer in network and she have a new PCP appt scheduled  07/17/2023.

## 2023-07-06 ENCOUNTER — LAB VISIT (OUTPATIENT)
Dept: LAB | Facility: HOSPITAL | Age: 28
End: 2023-07-06
Attending: INTERNAL MEDICINE
Payer: COMMERCIAL

## 2023-07-06 DIAGNOSIS — D35.2 PITUITARY ADENOMA: ICD-10-CM

## 2023-07-06 LAB
ANION GAP SERPL CALC-SCNC: 11 MMOL/L (ref 8–16)
CALCIUM SERPL-MCNC: 9.6 MG/DL (ref 8.7–10.5)
CHLORIDE SERPL-SCNC: 100 MMOL/L (ref 95–110)
CO2 SERPL-SCNC: 28 MMOL/L (ref 23–29)
CREAT SERPL-MCNC: 0.76 MG/DL (ref 0.5–1.4)
EST. GFR  (NO RACE VARIABLE): >60 ML/MIN/1.73 M^2
GLUCOSE SERPL-MCNC: 86 MG/DL (ref 70–110)
POTASSIUM SERPL-SCNC: 4.3 MMOL/L (ref 3.5–5.1)
SODIUM SERPL-SCNC: 139 MMOL/L (ref 136–145)
UUN UR-MCNC: 13 MG/DL (ref 7–17)

## 2023-07-06 PROCEDURE — 80048 BASIC METABOLIC PNL TOTAL CA: CPT | Mod: PO | Performed by: INTERNAL MEDICINE

## 2023-07-06 PROCEDURE — 36415 COLL VENOUS BLD VENIPUNCTURE: CPT | Mod: PO | Performed by: INTERNAL MEDICINE

## 2023-07-12 LAB
FINAL PATHOLOGIC DIAGNOSIS: NORMAL
GROSS: NORMAL
Lab: NORMAL
MICROSCOPIC EXAM: NORMAL
SUPPLEMENTAL DIAGNOSIS: NORMAL

## 2023-07-21 ENCOUNTER — OFFICE VISIT (OUTPATIENT)
Dept: ENDOCRINOLOGY | Facility: CLINIC | Age: 28
End: 2023-07-21
Payer: COMMERCIAL

## 2023-07-21 DIAGNOSIS — D35.2 PITUITARY ADENOMA: Primary | ICD-10-CM

## 2023-07-21 DIAGNOSIS — Z86.018 S/P SELECTIVE TRANSSPHENOIDAL PITUITARY ADENOMECTOMY: ICD-10-CM

## 2023-07-21 DIAGNOSIS — Z98.890 S/P SELECTIVE TRANSSPHENOIDAL PITUITARY ADENOMECTOMY: ICD-10-CM

## 2023-07-21 PROCEDURE — 1111F DSCHRG MED/CURRENT MED MERGE: CPT | Mod: CPTII,95,, | Performed by: INTERNAL MEDICINE

## 2023-07-21 PROCEDURE — 1111F PR DISCHARGE MEDS RECONCILED W/ CURRENT OUTPATIENT MED LIST: ICD-10-PCS | Mod: CPTII,95,, | Performed by: INTERNAL MEDICINE

## 2023-07-21 PROCEDURE — 99214 OFFICE O/P EST MOD 30 MIN: CPT | Mod: 95,,, | Performed by: INTERNAL MEDICINE

## 2023-07-21 PROCEDURE — 99214 PR OFFICE/OUTPT VISIT, EST, LEVL IV, 30-39 MIN: ICD-10-PCS | Mod: 95,,, | Performed by: INTERNAL MEDICINE

## 2023-07-21 NOTE — ASSESSMENT & PLAN NOTE
See above.  No symptoms to suggest current hormonal deficits.  Check 8AM cortisol, TSH/FT4, PRL and IGF-1.

## 2023-07-21 NOTE — PROGRESS NOTES
"FOLLOW-UP PATIENT - AUDIOVISUAL VIRTUAL VISIT    Subjective:      Chief Complaint: Follow-up after TSR for non-secreting macroadenoma.    HPI: Bernarda Cordero is a 28 y.o. female who is seen for follow-up       Past Medical History:   Diagnosis Date    ADD (attention deficit disorder)     ADHD (attention deficit hyperactivity disorder)     Anxiety     Hx of psychiatric care     Obesity     Psychiatric exam requested by authority     Psychiatric problem     Sleep difficulties     Therapy        With regards to pituitary macroadenoma:    History per Dr. Abbott's note:  Established with Dr. Huerta 11/11/2021 after pituitary mass found in workup for amenorrhea.  Most recent visit on 01/17/2023.  On Cabergoline since June 2022 with a 2 month pause in therapy (11/22-1/23) due to palpitations/tachycardia prompting ER visit with HR in the 130's.  Her echo, stress test, and Holter monitor were not concerning so in January cabergoline was restarted.           Initial presentation:                  Worsening galactorrhea after pregnancy despite cessation of breast feeding (delivered 6/30/2020).  Imaging with 1.4 cm cystic pituitary lesion (peak prolactin 53 with normal TSH, IGF-1)      MRI Pituitary: 11/01/2021  Cystic and solid mass in the pituitary gland with its epicenter in the right side of the gland 1.4 x 1.2 x 1.2 cm and displaces the rest of the gland parenchyma to the left as well as the infundibulum, which enhances normally, is normal in diameter, and extends toward the hypothalamic region, which is unremarkable. This also exerts some mass effect on the most distal chiasm and proximal post chiasmatic optic nerves."  Impression: "1.4 x 1.2 x 1.2 cm primarily cystic mass arising right sided pituitary gland is most likely a cystic or necrotic pituitary microadenoma       Most Recent Imaging:  MRI Pituitary: 05/17/2022  The pituitary gland appears to be enlarged measuring 1.1 cm in craniocaudal dimension and abutting " the optic chiasm. Questionable mass effect upon the optic chiasm is identified. The gland is heterogeneous and demonstrates a T2 hyperintense lesion which is eccentric to the right without evidence for invasion into the optic chiasm. The cystic component is T1 hypointense and demonstrates no significant enhancement. The craniocaudal dimension of the cyst is 0.9 cm.        No VF deficits pre-op.    Interval history:   No complications from TSR. Denies polyuria/polydipsia. 7-day post-op sodium normal. She was having light headedness initially right after surgery, but that's resolved. Sometimes headache and dizziness occasionally if she gets up to quickly, but she's able to avoid that by standing up slower.     She's stopped cabergoline post-op. She is currently on her menstrual cycle. No current galactorrhea.    Seeing Dr. Nevarez later today for post-op visit.    Reviewed past medical, family, social history and updated as appropriate.      Objective:     BP Readings from Last 5 Encounters:   07/01/23 106/64   06/20/23 108/71   04/04/23 104/68   04/04/23 104/68   06/01/17 118/64       Physical exam was not performed and vitals were not obtained due to this being a telemedicine (virtual) visit.      Wt Readings from Last 10 Encounters:   06/29/23 0555 92.1 kg (203 lb)   06/20/23 0809 92.4 kg (203 lb 12.8 oz)   04/04/23 0959 97.1 kg (214 lb 1.1 oz)   04/04/23 0946 97.1 kg (214 lb 1.1 oz)   06/01/17 0716 89.8 kg (198 lb)   02/19/16 1417 86.6 kg (191 lb)   05/09/11 0326 73.9 kg (162 lb 13.3 oz) (92 %, Z= 1.39)*   12/02/10 0030 74.6 kg (164 lb 5.7 oz) (93 %, Z= 1.45)*   11/29/10 0132 74.1 kg (163 lb 5.4 oz) (92 %, Z= 1.43)*   10/03/08 0145 71.8 kg (158 lb 5.4 oz) (94 %, Z= 1.53)*   05/31/07 0206 66.8 kg (147 lb 5 oz) (94 %, Z= 1.56)*     * Growth percentiles are based on CDC (Girls, 2-20 Years) data.         Assessment/Plan:     S/P selective transsphenoidal pituitary adenomectomy  See above.  No symptoms to suggest current  hormonal deficits.  Check 8AM cortisol, TSH/FT4, PRL and IGF-1.    Pituitary adenoma  S/p successful TSR. No apparent complications. Seeing Dr. Nevarez later today.      The patient location is: home  The chief complaint leading to consultation is: pituitary adenoma  Visit type: Virtual visit with synchronous audio and video  Total time spent with patient: 20  Each patient to whom he or she provides medical services by telemedicine is:  (1) informed of the relationship between the physician and patient and the respective role of any other health care provider with respect to management of the patient; and (2) notified that he or she may decline to receive medical services by telemedicine and may withdraw from such care at any time.    Notes: f/u with Dr. Abbott in 3-4 months  Labs Monday morning at [facility]      Follow up in about 4 months (around 11/21/2023).

## 2023-07-21 NOTE — Clinical Note
Schedule follow-up in pituitary clinic with DR. BUSTAMANTE in 3-4 months. Schedule labs on Monday, 7/24 at 8AM at [facility].

## 2023-07-25 ENCOUNTER — OFFICE VISIT (OUTPATIENT)
Dept: NEUROSURGERY | Facility: CLINIC | Age: 28
End: 2023-07-25
Payer: COMMERCIAL

## 2023-07-25 ENCOUNTER — PATIENT MESSAGE (OUTPATIENT)
Dept: NEUROSURGERY | Facility: CLINIC | Age: 28
End: 2023-07-25

## 2023-07-25 ENCOUNTER — PATIENT MESSAGE (OUTPATIENT)
Dept: ENDOCRINOLOGY | Facility: CLINIC | Age: 28
End: 2023-07-25
Payer: COMMERCIAL

## 2023-07-25 VITALS
SYSTOLIC BLOOD PRESSURE: 107 MMHG | TEMPERATURE: 98 F | BODY MASS INDEX: 35.3 KG/M2 | HEART RATE: 74 BPM | WEIGHT: 199.31 LBS | DIASTOLIC BLOOD PRESSURE: 73 MMHG

## 2023-07-25 DIAGNOSIS — D35.2 PITUITARY ADENOMA: Primary | ICD-10-CM

## 2023-07-25 PROCEDURE — 99999 PR PBB SHADOW E&M-EST. PATIENT-LVL III: CPT | Mod: PBBFAC,,, | Performed by: NEUROLOGICAL SURGERY

## 2023-07-25 PROCEDURE — 1159F MED LIST DOCD IN RCRD: CPT | Mod: CPTII,S$GLB,, | Performed by: NEUROLOGICAL SURGERY

## 2023-07-25 PROCEDURE — 3074F PR MOST RECENT SYSTOLIC BLOOD PRESSURE < 130 MM HG: ICD-10-PCS | Mod: CPTII,S$GLB,, | Performed by: NEUROLOGICAL SURGERY

## 2023-07-25 PROCEDURE — 99024 PR POST-OP FOLLOW-UP VISIT: ICD-10-PCS | Mod: S$GLB,,, | Performed by: NEUROLOGICAL SURGERY

## 2023-07-25 PROCEDURE — 1160F PR REVIEW ALL MEDS BY PRESCRIBER/CLIN PHARMACIST DOCUMENTED: ICD-10-PCS | Mod: CPTII,S$GLB,, | Performed by: NEUROLOGICAL SURGERY

## 2023-07-25 PROCEDURE — 1160F RVW MEDS BY RX/DR IN RCRD: CPT | Mod: CPTII,S$GLB,, | Performed by: NEUROLOGICAL SURGERY

## 2023-07-25 PROCEDURE — 3074F SYST BP LT 130 MM HG: CPT | Mod: CPTII,S$GLB,, | Performed by: NEUROLOGICAL SURGERY

## 2023-07-25 PROCEDURE — 3008F PR BODY MASS INDEX (BMI) DOCUMENTED: ICD-10-PCS | Mod: CPTII,S$GLB,, | Performed by: NEUROLOGICAL SURGERY

## 2023-07-25 PROCEDURE — 99999 PR PBB SHADOW E&M-EST. PATIENT-LVL III: ICD-10-PCS | Mod: PBBFAC,,, | Performed by: NEUROLOGICAL SURGERY

## 2023-07-25 PROCEDURE — 3078F PR MOST RECENT DIASTOLIC BLOOD PRESSURE < 80 MM HG: ICD-10-PCS | Mod: CPTII,S$GLB,, | Performed by: NEUROLOGICAL SURGERY

## 2023-07-25 PROCEDURE — 3078F DIAST BP <80 MM HG: CPT | Mod: CPTII,S$GLB,, | Performed by: NEUROLOGICAL SURGERY

## 2023-07-25 PROCEDURE — 1159F PR MEDICATION LIST DOCUMENTED IN MEDICAL RECORD: ICD-10-PCS | Mod: CPTII,S$GLB,, | Performed by: NEUROLOGICAL SURGERY

## 2023-07-25 PROCEDURE — 99024 POSTOP FOLLOW-UP VISIT: CPT | Mod: S$GLB,,, | Performed by: NEUROLOGICAL SURGERY

## 2023-07-25 PROCEDURE — 3008F BODY MASS INDEX DOCD: CPT | Mod: CPTII,S$GLB,, | Performed by: NEUROLOGICAL SURGERY

## 2023-07-25 NOTE — PROGRESS NOTES
Subjective:   I, Rochelle Goodwin, attest that this documentation has been prepared under the direction and in the presence of Hilario Nevarez MD.     Patient ID: Bernarda Cordero is a 28 y.o. female     Chief Complaint: No chief complaint on file.      HPI  MsColby Cordero is a 28 y.o. woman with prolactinoma who presents today for 2 week PO follow up of TSR for pituitary adenoma done on 6/29/2023. This is a patient who initially presented with worsening galactorrhea after her first pregnancy. She was found to have 1.4 cm cystic pituitary lesion during her workup. Denies vision compromises during that time. She attempted cabergoline with routine monitoring at which the lesion remained stable. HVF done in 2022 WNL. Upon evaluation today, the pt continues to take cabergoline. She has mildly irregular cycles, but no further lactation. Prolactin mildly elevated.  MRI Pituitary W WO Contrast (3/20/2023): 1.4 x 1.2 x 1.2 cm primarily cystic mass arising right sided pituitary gland.  I have recommended TSR. I have discussed the risks/benefits, indications, and alternatives for the proposed procedure in detail. I have answered all of their questions and patient wish to proceed with surgery.     Today the pt reports she is doing well postoperatively. She reports 2 weeks of headaches and frontal sinus pressure that has since resolved. Pt has been compliant with utilizing the Ocean nasal spray. She is otherwise recovering well with no complicating factors.    Review of Systems   Constitutional:  Negative for activity change, appetite change, fatigue, fever and unexpected weight change.   HENT:  Negative for facial swelling.    Eyes: Negative.    Respiratory: Negative.     Cardiovascular: Negative.    Gastrointestinal:  Negative for diarrhea, nausea and vomiting.   Endocrine: Negative.    Genitourinary: Negative.    Musculoskeletal:  Negative for back pain, joint swelling, myalgias and neck pain.   Neurological:   Negative for dizziness, seizures, weakness, numbness and headaches.   Psychiatric/Behavioral: Negative.        Past Medical History:   Diagnosis Date    ADD (attention deficit disorder)     ADHD (attention deficit hyperactivity disorder)     Anxiety     Hx of psychiatric care     Obesity     Psychiatric exam requested by authority     Psychiatric problem     Sleep difficulties     Therapy        Objective:      Vitals:    07/25/23 1125   BP: 107/73   Pulse: 74   Temp: 98.3 °F (36.8 °C)      Physical Exam  Constitutional:       General: She is not in acute distress.     Appearance: Normal appearance.   HENT:      Head: Normocephalic and atraumatic.   Pulmonary:      Effort: Pulmonary effort is normal.   Musculoskeletal:      Cervical back: Neck supple.   Neurological:      Mental Status: She is alert and oriented to person, place, and time.      GCS: GCS eye subscore is 4. GCS verbal subscore is 5. GCS motor subscore is 6.      Cranial Nerves: No cranial nerve deficit.       PATHOLOGY:  06- Pituitary, transsphenoidal hypophysectomy:   - Pituitary adenoma       IMAGING:  CTH WO Contrast (6/29/2023):  Expected postoperative change of trans-sphenoidal resection for sellar lesion. No evidence for hydrocephalus or significant volume acute intracranial hemorrhage.      I have personally reviewed the images with the pt.      I, Dr. Hilario Nevarez, personally performed the services described in this documentation. All medical record entries made by the scribe, Rochelle Goodwin, were at my direction and in my presence.  I have reviewed the chart and agree that the record reflects my personal performance and is accurate and complete. Hilario Nevarez MD. 07/25/2023    Assessment:       Pituitary adenoma.      Plan:   I have personally reviewed the CTH with the pt which shows expected postoperative change of trans-sphenoidal resection for sellar lesion. No evidence for hydrocephalus or significant volume acute intracranial  hemorrhage.    I have reviewed the pathology report with the patient which is positive for pituitary adenoma.     I will schedule the patient for 1 year follow up with MRI brain.

## 2023-07-25 NOTE — PATIENT INSTRUCTIONS
I have personally reviewed the CTH with the pt which shows expected postoperative change of trans-sphenoidal resection for sellar lesion. No evidence for hydrocephalus or significant volume acute intracranial hemorrhage.    I have reviewed the pathology report with the patient which is positive for pituitary adenoma.     I will schedule the patient for 1 year follow up with MRI brain.

## 2023-07-27 ENCOUNTER — PATIENT MESSAGE (OUTPATIENT)
Dept: NEUROSURGERY | Facility: CLINIC | Age: 28
End: 2023-07-27
Payer: COMMERCIAL

## 2023-08-08 ENCOUNTER — TUMOR BOARD CONFERENCE (OUTPATIENT)
Dept: NEUROSURGERY | Facility: CLINIC | Age: 28
End: 2023-08-08
Payer: COMMERCIAL

## 2023-08-09 NOTE — PROGRESS NOTES
[facility]   NEURO-ONCOLOGICAL MULTIDISCIPLINARY TUMOR BOARD  PATIENT REVIEW FORM  ____________________________________________________________  Rochelle BETHEA, attest that this documentation has been prepared under the direction and in the presence of Hilario Nevarez MD.     PATIENT ID: Bernarda Cordero is a 28 y.o. female  DATE: 08/09/2023    This patient's relevant clinical data was reviewed before the multidisciplinary tumor board in order to establish an optimum treatment plan in this patient. The patient's clinical data were presented at our Multi-Disciplinary Tumor Board which included representatives of Neurosurgery, Neuro-Radiology, Neuro-Pathology, Radiation Oncology, Medical Oncology, Palliative Medicine, and Endocrinology.     PRESENTER:   Dr. Nevarez    PATIENT SUMMARY:   The patient is a 28 y.o. female who initially presented w/ worsening galactorrhea following first pregnancy. Found to have pituitary lesion during workup (2021). Elected to proceed w/ TSR.     3/20/23 - Imaging showing cystic mass arising R sided pituitary gland   6/29/23 - TSR/Dr Nevarez       Additionally, we reviewed previous medical and familial history of present illness, and recent lab results along with all available histopathologic and imaging studies.    IMAGING:   CTH WO Contrast (6/29/2023):  Expected postoperative change of trans-sphenoidal resection for sellar lesion as detailed above. No evidence for hydrocephalus or significant volume acute intracranial hemorrhage    PATHOLOGY:  6-29-23 ([facility]) Pituitary, Transsphenoidal hypophysectomy: PITUITARY TUMOR/ADENOMA W/ CORTICOTROPH DIFFERENTIATION    BOARD RECOMMENDATIONS:   The tumor board considered available treatment options and made the following recommendations: Pt would like to transition her care and will follow up with Dr. Bustamante.    National site-specific guidelines were discussed with respect to the case.     Tumor board is a meeting of clinicians from various  specialty areas who evaluate and discuss patients for whom a multidisciplinary approach is being considered. Final determinations in the plan of care are those of the provider(s). The responsibility for follow up recommendations given during tumor board is that of the provider.     CONSULT NEEDED:     [] Neurosurgery    [] Hem/Onc    [] Rad/Onc         [] Endocrinology     [] Neuro-ophthalmology    [] Palliative Medicine      []  Other:       PRESENTATION AT CANCER CONFERENCE:         [] Prospective    [x] Retrospective     [] Follow-Up          [] Eligible for clinical trial/Clinical trial status:

## 2024-03-20 ENCOUNTER — TELEPHONE (OUTPATIENT)
Dept: NEUROSURGERY | Facility: CLINIC | Age: 29
End: 2024-03-20
Payer: COMMERCIAL

## 2024-03-20 DIAGNOSIS — D35.2 PITUITARY ADENOMA: Primary | ICD-10-CM

## 2024-03-20 DIAGNOSIS — E89.3 S/P TRANSSPHENOIDAL HYPOPHYSECTOMY: ICD-10-CM

## 2024-03-20 NOTE — TELEPHONE ENCOUNTER
Pt reports having a few drops of clear fluid flowing from right nostril when she bends forward for about 3 weeks.  The drainage will happen randomly when bending over, and about 3-4 drops of clear fluid.  No other symptoms reported.  Denies position headache.  Patient will  a specimen cup from [facility] lab to collect drainage.  Bring specimen to [facility] lab when drainage has been collected. Call back if other symptoms start to develop. Pt suzie.

## 2024-03-20 NOTE — TELEPHONE ENCOUNTER
Pt picked up specimen cup from lab.  Instructed patient to bring the lab to main campus if she collects enough. suzie

## 2024-03-20 NOTE — TELEPHONE ENCOUNTER
----- Message from Tala Avila sent at 3/20/2024 11:50 AM CDT -----  Regarding: Patient Advice  Contact: Pt [phone]  Pt is calling to state she has clear drainage coming out the right side of her nose and states primary care stated to contact her surgeon please call

## 2024-03-20 NOTE — TELEPHONE ENCOUNTER
----- Message from Lala Andrea sent at 3/20/2024  1:53 PM CDT -----  Regarding: Call back needed  Contact: [phone]  Hi, pt is at the Lab right now and need to spk with the nurse to discuss what is the exact specimen tube for collection. Pls call the pt at  [phone] to discuss.  Thank you.

## 2024-06-26 ENCOUNTER — TELEPHONE (OUTPATIENT)
Dept: NEUROSURGERY | Facility: CLINIC | Age: 29
End: 2024-06-26
Payer: COMMERCIAL

## 2024-06-26 DIAGNOSIS — E89.3 S/P TRANSSPHENOIDAL HYPOPHYSECTOMY: ICD-10-CM

## 2024-06-26 DIAGNOSIS — D35.2 PITUITARY ADENOMA: Primary | ICD-10-CM

## 2024-06-26 NOTE — TELEPHONE ENCOUNTER
----- Message from Rosalind Acosta sent at 6/25/2024  9:10 AM CDT -----  Regarding: Requesting Orders  Contact: pt [phone]  Pt requesting orders for MRI to schedule annual post operative follow up. Pls call

## 2024-07-05 DIAGNOSIS — F41.9 ANXIETY: Primary | ICD-10-CM

## 2024-07-05 NOTE — TELEPHONE ENCOUNTER
----- Message from Anneliesearslanman Andrea sent at 7/5/2024  3:03 PM CDT -----  Regarding: Call requested  Contact: [phone]  Hi, pt called to request a call from the office to discuss getting medication for her future MRI(07/11/24 @ 10:15am ). Pls call  [phone] to discuss.  Thank you.

## 2024-07-08 DIAGNOSIS — E23.7 PITUITARY ABNORMALITY: Primary | ICD-10-CM

## 2024-07-08 DIAGNOSIS — F41.9 ANXIETY: ICD-10-CM

## 2024-07-08 RX ORDER — ALPRAZOLAM 0.5 MG/1
0.5 TABLET ORAL 2 TIMES DAILY PRN
Qty: 2 TABLET | Refills: 0 | Status: SHIPPED | OUTPATIENT
Start: 2024-07-08 | End: 2024-08-07

## 2024-07-08 RX ORDER — LORAZEPAM 1 MG/1
1 TABLET ORAL 2 TIMES DAILY PRN
Qty: 2 TABLET | Refills: 0 | OUTPATIENT
Start: 2024-07-08 | End: 2024-08-07

## 2025-07-18 ENCOUNTER — TELEPHONE (OUTPATIENT)
Dept: NEUROSURGERY | Facility: CLINIC | Age: 30
End: 2025-07-18
Payer: COMMERCIAL

## 2025-07-18 DIAGNOSIS — D35.2 PITUITARY ADENOMA: Primary | ICD-10-CM

## 2025-07-18 NOTE — TELEPHONE ENCOUNTER
S/w pt. Advised her that we can do MRI and labs externally and then have an appt with Dr. Nevarez. She is O[facility] and willing to pay for an appt. Advised that she can call central billing for pricing regarding the appt. She v/u. Advised pt that I will fax orders to her work place at [phone] and to call back once schedule so that I can schedule f/u appt. She v/u.         Copied from CRM #[id number]. Topic: General Inquiry - Patient Advice  >> Jul 18, 2025  8:47 AM Aga wrote:  Pt is calling to speak with nurse regarding her annual MRI and seeing if she can discuss how to set it up as out of pocket visit because her insurance will not cover it pls advise

## 2025-07-25 ENCOUNTER — TELEPHONE (OUTPATIENT)
Dept: NEUROSURGERY | Facility: CLINIC | Age: 30
End: 2025-07-25
Payer: COMMERCIAL

## 2025-07-25 NOTE — TELEPHONE ENCOUNTER
S/w pt informing her that orders have been faxed. She v/u      Copied from CRM #[id number]. Topic: General Inquiry - Patient Advice  >> Jul 25, 2025  9:32 AM Lexie wrote:  Consult/Advisory    Name Of Caller:Bernarda Cordero        Contact Preference:[phone] (home)     Nature of call:  Pt sates her order for MRI with and without contrast of brain  Externally was never received please re fax to [phone]

## 2025-08-12 ENCOUNTER — TELEPHONE (OUTPATIENT)
Dept: NEUROSURGERY | Facility: CLINIC | Age: 30
End: 2025-08-12
Payer: COMMERCIAL

## 2025-09-02 ENCOUNTER — OFFICE VISIT (OUTPATIENT)
Dept: NEUROSURGERY | Facility: CLINIC | Age: 30
End: 2025-09-02

## 2025-09-02 DIAGNOSIS — E89.3 S/P TRANSSPHENOIDAL HYPOPHYSECTOMY: ICD-10-CM

## 2025-09-02 DIAGNOSIS — D35.2 PITUITARY ADENOMA: Primary | ICD-10-CM

## 2025-09-02 PROCEDURE — 99999 PR PBB SHADOW E&M-EST. PATIENT-LVL II: CPT | Mod: PBBFAC,,, | Performed by: NEUROLOGICAL SURGERY

## 2025-09-02 PROCEDURE — 99214 OFFICE O/P EST MOD 30 MIN: CPT | Mod: ,,, | Performed by: NEUROLOGICAL SURGERY

## (undated) DEVICE — PINS SKULL ADULT MAYFIELD
Type: IMPLANTABLE DEVICE | Site: CRANIAL | Status: NON-FUNCTIONAL
Removed: 2023-06-29

## (undated) DEVICE — HEMOSTAT SURGICEL 4X8IN

## (undated) DEVICE — TRAY NEURO OMC

## (undated) DEVICE — CONTAINER SPECIMEN STRL 4OZ

## (undated) DEVICE — ELECTRODE REM PLYHSV RETURN 9

## (undated) DEVICE — SEALANT VISTASEAL FIBRIN 10ML

## (undated) DEVICE — SUCTION COAGULATOR 10FR 6IN

## (undated) DEVICE — DRAPE STERI-DRAPE 1000 17X11IN

## (undated) DEVICE — SPONGE PATTY SURGICAL .5X3IN

## (undated) DEVICE — SUT CTD VICRYL BR CR/SH VIL

## (undated) DEVICE — DRESSING SURGICAL 1/2X1/2

## (undated) DEVICE — DURAPREP SURG SCRUB 26ML

## (undated) DEVICE — TRAY CATH FOL SIL URIMTR 16FR

## (undated) DEVICE — RUBBERBAND STERILE 3X1/8IN

## (undated) DEVICE — DRESSING TRANS 4X4 TEGADERM

## (undated) DEVICE — DRESSING TELFA N ADH 3X8

## (undated) DEVICE — DRAPE T THYROID STERILE

## (undated) DEVICE — APPLICATOR NS COTTON-TIP 3IN

## (undated) DEVICE — MARKERS SPHERZ PASSIVE

## (undated) DEVICE — TUBE FRAZIER 5MM 2FT SOFT TIP

## (undated) DEVICE — BLADE SURG CARBON STEEL SZ11

## (undated) DEVICE — SPONGE NEURO 1/4X1/4

## (undated) DEVICE — CORD BIPOLAR 12 FOOT

## (undated) DEVICE — DRAPE OPMI STERILE